# Patient Record
Sex: MALE | Race: WHITE | Employment: OTHER | URBAN - NONMETROPOLITAN AREA
[De-identification: names, ages, dates, MRNs, and addresses within clinical notes are randomized per-mention and may not be internally consistent; named-entity substitution may affect disease eponyms.]

---

## 2017-06-19 DIAGNOSIS — F41.9 ANXIETY: ICD-10-CM

## 2017-06-20 NOTE — TELEPHONE ENCOUNTER
Cardura      Last Written Prescription Date: 7/14/2016  Last Fill Quantity: 180, # refills: 0    Last Office Visit with G, P or [facility] prescribing provider:  8/28/2015   Future Office Visit:        BP Readings from Last 3 Encounters:   08/16/16 130/68   01/19/16 156/82   08/28/15 140/78

## 2017-06-22 RX ORDER — DOXAZOSIN 8 MG/1
TABLET ORAL
Qty: 30 TABLET | Refills: 0 | Status: SHIPPED | OUTPATIENT
Start: 2017-06-22 | End: 2017-09-11

## 2017-09-11 ENCOUNTER — OFFICE VISIT (OUTPATIENT)
Dept: FAMILY MEDICINE | Facility: OTHER | Age: 82
End: 2017-09-11
Attending: FAMILY MEDICINE
Payer: COMMERCIAL

## 2017-09-11 VITALS
WEIGHT: 151 LBS | SYSTOLIC BLOOD PRESSURE: 180 MMHG | OXYGEN SATURATION: 98 % | DIASTOLIC BLOOD PRESSURE: 84 MMHG | TEMPERATURE: 97.5 F | BODY MASS INDEX: 24.27 KG/M2 | HEART RATE: 55 BPM | HEIGHT: 66 IN

## 2017-09-11 DIAGNOSIS — H61.102 LESION OF LEFT PINNA: ICD-10-CM

## 2017-09-11 DIAGNOSIS — I10 ESSENTIAL HYPERTENSION, BENIGN: Primary | ICD-10-CM

## 2017-09-11 PROCEDURE — 99213 OFFICE O/P EST LOW 20 MIN: CPT | Performed by: FAMILY MEDICINE

## 2017-09-11 PROCEDURE — 99212 OFFICE O/P EST SF 10 MIN: CPT

## 2017-09-11 RX ORDER — DOXAZOSIN 8 MG/1
8 TABLET ORAL DAILY
Qty: 90 TABLET | Refills: 3 | Status: SHIPPED | OUTPATIENT
Start: 2017-09-11 | End: 2018-01-01

## 2017-09-11 ASSESSMENT — PAIN SCALES - GENERAL: PAINLEVEL: NO PAIN (0)

## 2017-09-11 NOTE — MR AVS SNAPSHOT
After Visit Summary   9/11/2017    Mary Irizarry    MRN: [id number]           Patient Information     Date Of Birth          8/27/1932        Visit Information        Provider Department      9/11/2017 1:45 PM JABIER Kent MD [facility]        Today's Diagnoses     Essential hypertension, benign    -  1    Anxiety        Lesion of left pinna          Care Instructions    See ENT. Try bacitracin          Follow-ups after your visit        Additional Services     OTOLARYNGOLOGY REFERRAL       [phone]   [email or web address]    Please be aware that coverage of these services is subject to the terms and limitations of your health insurance plan.  Call member services at your health plan with any benefit or coverage questions.      Please bring the following to your appointment:  >>   Any x-rays, CTs or MRIs which have been performed.  Contact the facility where they were done to arrange for  prior to your scheduled appointment.  Any new CT, MRI or other procedures ordered by your specialist must be performed at a [place] facility or coordinated by your clinic's referral office.    >>   List of current medications   >>   This referral request   >>   Any documents/labs given to you for this referral                  Your next 10 appointments already scheduled     Sep 11, 2017  1:45 PM CDT   (Arrive by 1:30 PM)   Office Visit with JABIER Kent MD   [facility] )    [address]  [facility] [address]   [phone]           Bring a current list of meds and any records pertaining to this visit.  For Physicals, please bring immunization records and any forms needing to be filled out.  Please arrive 15 minutes early to complete paperwork and register.              Who to contact     If you have questions or need  "follow up information about today's clinic visit or your schedule please contact [facility] directly at [phone].  Normal or non-critical lab and imaging results will be communicated to you by MyChart, letter or phone within 4 business days after the clinic has received the results. If you do not hear from us within 7 days, please contact the clinic through MyChart or phone. If you have a critical or abnormal lab result, we will notify you by phone as soon as possible.  Submit refill requests through [employer] or call your pharmacy and they will forward the refill request to us. Please allow 3 business days for your refill to be completed.          Additional Information About Your Visit        Care EveryWhere ID     This is your Care EveryWhere ID. This could be used by other organizations to access your [facility] medical records  [id number]        Your Vitals Were     Pulse Temperature Height Pulse Oximetry BMI (Body Mass Index)       55 97.5  F (36.4  C) (Tympanic) 5' 5.5\" (1.664 m) 98% 24.75 kg/m2        Blood Pressure from Last 3 Encounters:   09/11/17 180/84   08/16/16 130/68   01/19/16 156/82    Weight from Last 3 Encounters:   09/11/17 151 lb (68.5 kg)   08/16/16 150 lb (68 kg)   01/19/16 150 lb (68 kg)              We Performed the Following     OTOLARYNGOLOGY REFERRAL          Today's Medication Changes          These changes are accurate as of: 9/11/17  1:42 PM.  If you have any questions, ask your nurse or doctor.               These medicines have changed or have updated prescriptions.        Dose/Directions    doxazosin 8 MG tablet   Commonly known as:  CARDURA   This may have changed:  See the new instructions.   Used for:  Essential hypertension, benign        Dose:  8 mg   Take 1 tablet (8 mg) by mouth daily   Quantity:  90 tablet   Refills:  3            Where to get your medicines      These medications were sent to [facility] - [place], [place] - [address]  [address] [facility] [address]     Phone:  [phone]     doxazosin 8 MG tablet                Primary Care Provider Office Phone # Fax #    R Uriel Kent [phone]       [facility] [address] [facility] [address]        Equal Access to Services     SUSAN XAVIER : Hadii aad ku hadasho Soomaali, waaxda luqadaha, qaybta kaalmada adeegyada, waxay idiin hayaan adeeg kharash la'aan . So [facility] [phone].    ATENCIÓN: Si habla español, tiene a quiroz disposición servicios gratuitos de asistencia lingüística. Llame al [phone].    We comply with applicable federal civil rights laws and [place] laws. We do not discriminate on the basis of race, color, national origin, age, disability sex, sexual orientation or gender identity.            Thank you!     Thank you for choosing [facility]  for your care. Our goal is always to provide you with excellent care. Hearing back from our patients is one way we can continue to improve our services. Please take a few minutes to complete the written survey that you may receive in the mail after your visit with us. Thank you!             Your Updated Medication List - Protect others around you: Learn how to safely use, store and throw away your medicines at www.disposemymeds.org.          This list is accurate as of: 9/11/17  1:42 PM.  Always use your most recent med list.                   Brand Name Dispense Instructions for use Diagnosis    doxazosin 8 MG tablet    CARDURA    90 tablet    Take 1 tablet (8 mg) by mouth daily    Essential hypertension, benign

## 2017-09-11 NOTE — PROGRESS NOTES
SUBJECTIVE:   Mary Irizarry is a 85 year old male who presents to clinic today for the following health issues:        Hypertension Follow-up      Outpatient blood pressures are being checked at home.  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  He's still active at home and is content.  Here with his wife.    He also had something on his left Pinna and he scratched it.    Problem list and histories reviewed & adjusted, as indicated.  Additional history: as documented    Patient Active Problem List   Diagnosis     Facial lesion     Left inguinal hernia     ACP (advance care planning)     Lesion of left pinna     Essential hypertension, benign     Past Surgical History:   Procedure Laterality Date     GI SURGERY      prostatectomy       Social History   Substance Use Topics     Smoking status: Former Smoker     Smokeless tobacco: Never Used      Comment: 2 mos while in high school.      Alcohol use 0.0 oz/week     0 Standard drinks or equivalent per week      Comment: occa     No family history on file.      Current Outpatient Prescriptions   Medication Sig Dispense Refill     doxazosin (CARDURA) 8 MG tablet Take 1 tablet (8 mg) by mouth daily 90 tablet 3     [DISCONTINUED] doxazosin (CARDURA) 8 MG tablet TAKE 1 TABLET BY MOUTH DAILY 30 tablet 0     No Known Allergies      [facility] and [facility]       Reviewed and updated as needed this visit by Provider         ROS:  Constitutional, HEENT, cardiovascular, pulmonary, gi and gu systems are negative, except as otherwise noted.      OBJECTIVE:   /84 (BP Location:  "Left arm, Patient Position: Chair, Cuff Size: Adult Regular)  Pulse 55  Temp 97.5  F (36.4  C) (Tympanic)  Ht 5' 5.5\" (1.664 m)  Wt 151 lb (68.5 kg)  SpO2 98%  BMI 24.75 kg/m2  Body mass index is 24.75 kg/(m^2).  GENERAL: healthy, alert and no distress  NECK: no adenopathy, no asymmetry, masses, or scars and thyroid normal to palpation  HEENT: left pinna is a raised lesion that looks like he had unroofed it and there is a scab over it  RESP: lungs clear to auscultation - no rales, rhonchi or wheezes  CV: regular rate and rhythm, normal S1 S2, no S3 or S4, no murmur, click or rub, no peripheral edema and peripheral pulses strong  ABDOMEN: soft, nontender, no hepatosplenomegaly, no masses and bowel sounds normal  MS: no gross musculoskeletal defects noted, no edema    Diagnostic Test Results:  none     ASSESSMENT/PLAN:               ICD-10-CM    1. Essential hypertension, benign I10 doxazosin (CARDURA) 8 MG tablet his pressures at home are in the 140s.   2. Lesion of left pinna H61.102 OTOLARYNGOLOGY REFERRAL for reevaluation of this fleshy growth left pinna           R Uriel Kent MD  [facility] [place]  "

## 2017-09-11 NOTE — NURSING NOTE
"Chief Complaint   Patient presents with     Hypertension       Initial /84 (BP Location: Left arm, Patient Position: Chair, Cuff Size: Adult Regular)  Pulse 55  Temp 97.5  F (36.4  C) (Tympanic)  Ht 5' 5.5\" (1.664 m)  Wt 151 lb (68.5 kg)  SpO2 98%  BMI 24.75 kg/m2 Estimated body mass index is 24.75 kg/(m^2) as calculated from the following:    Height as of this encounter: 5' 5.5\" (1.664 m).    Weight as of this encounter: 151 lb (68.5 kg).  Medication Reconciliation: complete   Karla Prado CMA(AAMA)   "

## 2018-01-01 ENCOUNTER — ONCOLOGY VISIT (OUTPATIENT)
Dept: ONCOLOGY | Facility: OTHER | Age: 83
End: 2018-01-01
Attending: INTERNAL MEDICINE
Payer: MEDICARE

## 2018-01-01 ENCOUNTER — OFFICE VISIT (OUTPATIENT)
Dept: CARDIOLOGY | Facility: OTHER | Age: 83
End: 2018-01-01
Attending: INTERNAL MEDICINE
Payer: COMMERCIAL

## 2018-01-01 ENCOUNTER — HOSPITAL ENCOUNTER (OUTPATIENT)
Dept: PHYSICAL THERAPY | Facility: HOSPITAL | Age: 83
Setting detail: THERAPIES SERIES
End: 2018-12-20
Attending: FAMILY MEDICINE
Payer: MEDICARE

## 2018-01-01 ENCOUNTER — ANTICOAGULATION THERAPY VISIT (OUTPATIENT)
Dept: ANTICOAGULATION | Facility: OTHER | Age: 83
End: 2018-01-01
Attending: FAMILY MEDICINE
Payer: MEDICARE

## 2018-01-01 ENCOUNTER — TELEPHONE (OUTPATIENT)
Dept: FAMILY MEDICINE | Facility: OTHER | Age: 83
End: 2018-01-01

## 2018-01-01 ENCOUNTER — OFFICE VISIT (OUTPATIENT)
Dept: FAMILY MEDICINE | Facility: OTHER | Age: 83
End: 2018-01-01
Attending: FAMILY MEDICINE
Payer: MEDICARE

## 2018-01-01 VITALS
BODY MASS INDEX: 27.57 KG/M2 | DIASTOLIC BLOOD PRESSURE: 72 MMHG | TEMPERATURE: 97 F | HEART RATE: 89 BPM | WEIGHT: 176 LBS | OXYGEN SATURATION: 92 % | SYSTOLIC BLOOD PRESSURE: 108 MMHG

## 2018-01-01 VITALS
HEART RATE: 80 BPM | WEIGHT: 159 LBS | DIASTOLIC BLOOD PRESSURE: 67 MMHG | RESPIRATION RATE: 18 BRPM | HEIGHT: 68 IN | BODY MASS INDEX: 24.1 KG/M2 | SYSTOLIC BLOOD PRESSURE: 118 MMHG | OXYGEN SATURATION: 96 %

## 2018-01-01 VITALS
HEART RATE: 81 BPM | TEMPERATURE: 96.6 F | SYSTOLIC BLOOD PRESSURE: 102 MMHG | BODY MASS INDEX: 27.4 KG/M2 | RESPIRATION RATE: 17 BRPM | WEIGHT: 174.6 LBS | HEIGHT: 67 IN | OXYGEN SATURATION: 96 % | DIASTOLIC BLOOD PRESSURE: 60 MMHG

## 2018-01-01 VITALS
HEIGHT: 69 IN | DIASTOLIC BLOOD PRESSURE: 68 MMHG | SYSTOLIC BLOOD PRESSURE: 119 MMHG | OXYGEN SATURATION: 95 % | BODY MASS INDEX: 24.88 KG/M2 | RESPIRATION RATE: 16 BRPM | WEIGHT: 168 LBS | HEART RATE: 80 BPM

## 2018-01-01 DIAGNOSIS — R60.0 LOCALIZED EDEMA: ICD-10-CM

## 2018-01-01 DIAGNOSIS — I51.89 DIASTOLIC DYSFUNCTION: Primary | ICD-10-CM

## 2018-01-01 DIAGNOSIS — I50.32 CHRONIC DIASTOLIC HEART FAILURE (H): ICD-10-CM

## 2018-01-01 DIAGNOSIS — Z91.89 SEDENTARY LIFESTYLE: ICD-10-CM

## 2018-01-01 DIAGNOSIS — I51.89 DIASTOLIC DYSFUNCTION: ICD-10-CM

## 2018-01-01 DIAGNOSIS — Z79.01 ON COUMADIN FOR ATRIAL FIBRILLATION (H): ICD-10-CM

## 2018-01-01 DIAGNOSIS — I51.7 LAE (LEFT ATRIAL ENLARGEMENT): ICD-10-CM

## 2018-01-01 DIAGNOSIS — R06.09 DYSPNEA ON EXERTION: ICD-10-CM

## 2018-01-01 DIAGNOSIS — I10 ESSENTIAL HYPERTENSION, BENIGN: ICD-10-CM

## 2018-01-01 DIAGNOSIS — N18.2 CHRONIC KIDNEY DISEASE, STAGE 2 (MILD): ICD-10-CM

## 2018-01-01 DIAGNOSIS — D68.59 PRIMARY HYPERCOAGULABLE STATE (H): ICD-10-CM

## 2018-01-01 DIAGNOSIS — Z23 NEED FOR PROPHYLACTIC VACCINATION AND INOCULATION AGAINST INFLUENZA: ICD-10-CM

## 2018-01-01 DIAGNOSIS — R53.83 OTHER FATIGUE: ICD-10-CM

## 2018-01-01 DIAGNOSIS — R60.0 PEDAL EDEMA: ICD-10-CM

## 2018-01-01 DIAGNOSIS — I48.91 ON COUMADIN FOR ATRIAL FIBRILLATION (H): ICD-10-CM

## 2018-01-01 DIAGNOSIS — Z86.711 HISTORY OF PULMONARY EMBOLISM: ICD-10-CM

## 2018-01-01 DIAGNOSIS — G89.29 CHRONIC LEFT SHOULDER PAIN: Primary | ICD-10-CM

## 2018-01-01 DIAGNOSIS — M25.512 CHRONIC LEFT SHOULDER PAIN: ICD-10-CM

## 2018-01-01 DIAGNOSIS — I48.91 NEW ONSET A-FIB (H): ICD-10-CM

## 2018-01-01 DIAGNOSIS — I26.99 PULMONARY EMBOLISM, BILATERAL (H): Primary | ICD-10-CM

## 2018-01-01 DIAGNOSIS — J96.01 ACUTE RESPIRATORY FAILURE WITH HYPOXIA (H): Primary | ICD-10-CM

## 2018-01-01 DIAGNOSIS — M25.512 CHRONIC LEFT SHOULDER PAIN: Primary | ICD-10-CM

## 2018-01-01 DIAGNOSIS — G89.29 CHRONIC LEFT SHOULDER PAIN: ICD-10-CM

## 2018-01-01 DIAGNOSIS — Z79.01 LONG TERM CURRENT USE OF ANTICOAGULANT THERAPY: Primary | ICD-10-CM

## 2018-01-01 LAB
ALBUMIN SERPL-MCNC: 3.4 G/DL (ref 3.4–5)
ALP SERPL-CCNC: 71 U/L (ref 40–150)
ALT SERPL W P-5'-P-CCNC: 34 U/L (ref 0–70)
ANION GAP SERPL CALCULATED.3IONS-SCNC: 5 MMOL/L (ref 3–14)
ANION GAP SERPL CALCULATED.3IONS-SCNC: 6 MMOL/L (ref 3–14)
AST SERPL W P-5'-P-CCNC: 19 U/L (ref 0–45)
BASOPHILS # BLD AUTO: 0 10E9/L (ref 0–0.2)
BASOPHILS NFR BLD AUTO: 0.4 %
BILIRUB SERPL-MCNC: 0.7 MG/DL (ref 0.2–1.3)
BUN SERPL-MCNC: 21 MG/DL (ref 7–30)
BUN SERPL-MCNC: 24 MG/DL (ref 7–30)
CALCIUM SERPL-MCNC: 8.4 MG/DL (ref 8.5–10.1)
CALCIUM SERPL-MCNC: 8.4 MG/DL (ref 8.5–10.1)
CHLORIDE SERPL-SCNC: 107 MMOL/L (ref 94–109)
CHLORIDE SERPL-SCNC: 109 MMOL/L (ref 94–109)
CO2 SERPL-SCNC: 28 MMOL/L (ref 20–32)
CO2 SERPL-SCNC: 28 MMOL/L (ref 20–32)
CREAT SERPL-MCNC: 1.02 MG/DL (ref 0.66–1.25)
CREAT SERPL-MCNC: 1.1 MG/DL (ref 0.66–1.25)
D DIMER PPP DDU-MCNC: 249 NG/ML D-DU (ref 0–300)
DIFFERENTIAL METHOD BLD: ABNORMAL
EOSINOPHIL # BLD AUTO: 0.1 10E9/L (ref 0–0.7)
EOSINOPHIL NFR BLD AUTO: 1 %
ERYTHROCYTE [DISTWIDTH] IN BLOOD BY AUTOMATED COUNT: 15.9 % (ref 10–15)
GFR SERPL CREATININE-BSD FRML MDRD: 63 ML/MIN/1.7M2
GFR SERPL CREATININE-BSD FRML MDRD: 69 ML/MIN/1.7M2
GLUCOSE SERPL-MCNC: 91 MG/DL (ref 70–99)
GLUCOSE SERPL-MCNC: 94 MG/DL (ref 70–99)
HCT VFR BLD AUTO: 36.5 % (ref 40–53)
HCYS SERPL-SCNC: 10.2 UMOL/L (ref 4–12)
HGB BLD-MCNC: 11.3 G/DL (ref 13.3–17.7)
IMM GRANULOCYTES # BLD: 0 10E9/L (ref 0–0.4)
IMM GRANULOCYTES NFR BLD: 0.1 %
INR POINT OF CARE: 2 (ref 0.86–1.14)
INR POINT OF CARE: 2.6 (ref 0.86–1.14)
INR POINT OF CARE: 3.9 (ref 0.86–1.14)
INR PPP: 3.57 (ref 0.8–1.2)
LDH SERPL L TO P-CCNC: 238 U/L (ref 85–227)
LYMPHOCYTES # BLD AUTO: 2.1 10E9/L (ref 0.8–5.3)
LYMPHOCYTES NFR BLD AUTO: 30.4 %
MCH RBC QN AUTO: 24.7 PG (ref 26.5–33)
MCHC RBC AUTO-ENTMCNC: 31 G/DL (ref 31.5–36.5)
MCV RBC AUTO: 80 FL (ref 78–100)
MONOCYTES # BLD AUTO: 0.6 10E9/L (ref 0–1.3)
MONOCYTES NFR BLD AUTO: 8.7 %
NEUTROPHILS # BLD AUTO: 4 10E9/L (ref 1.6–8.3)
NEUTROPHILS NFR BLD AUTO: 59.4 %
NRBC # BLD AUTO: 0 10*3/UL
NRBC BLD AUTO-RTO: 0 /100
NT-PROBNP SERPL-MCNC: 2114 PG/ML (ref 0–450)
NT-PROBNP SERPL-MCNC: 2374 PG/ML (ref 0–450)
PLATELET # BLD AUTO: 203 10E9/L (ref 150–450)
POTASSIUM SERPL-SCNC: 3.7 MMOL/L (ref 3.4–5.3)
POTASSIUM SERPL-SCNC: 3.9 MMOL/L (ref 3.4–5.3)
PROT SERPL-MCNC: 7.6 G/DL (ref 6.8–8.8)
RBC # BLD AUTO: 4.57 10E12/L (ref 4.4–5.9)
SODIUM SERPL-SCNC: 141 MMOL/L (ref 133–144)
SODIUM SERPL-SCNC: 142 MMOL/L (ref 133–144)
WBC # BLD AUTO: 6.8 10E9/L (ref 4–11)

## 2018-01-01 PROCEDURE — 99213 OFFICE O/P EST LOW 20 MIN: CPT | Performed by: INTERNAL MEDICINE

## 2018-01-01 PROCEDURE — 93010 ELECTROCARDIOGRAM REPORT: CPT | Performed by: INTERNAL MEDICINE

## 2018-01-01 PROCEDURE — G0463 HOSPITAL OUTPT CLINIC VISIT: HCPCS

## 2018-01-01 PROCEDURE — 36415 COLL VENOUS BLD VENIPUNCTURE: CPT | Mod: ZL | Performed by: FAMILY MEDICINE

## 2018-01-01 PROCEDURE — 83880 ASSAY OF NATRIURETIC PEPTIDE: CPT | Mod: ZL | Performed by: INTERNAL MEDICINE

## 2018-01-01 PROCEDURE — 97161 PT EVAL LOW COMPLEX 20 MIN: CPT | Mod: GP

## 2018-01-01 PROCEDURE — G8982 BODY POS GOAL STATUS: HCPCS | Mod: GP,CJ

## 2018-01-01 PROCEDURE — 99000 SPECIMEN HANDLING OFFICE-LAB: CPT | Performed by: INTERNAL MEDICINE

## 2018-01-01 PROCEDURE — 80048 BASIC METABOLIC PNL TOTAL CA: CPT | Mod: ZL | Performed by: INTERNAL MEDICINE

## 2018-01-01 PROCEDURE — 80053 COMPREHEN METABOLIC PANEL: CPT | Mod: ZL | Performed by: INTERNAL MEDICINE

## 2018-01-01 PROCEDURE — G0008 ADMIN INFLUENZA VIRUS VAC: HCPCS | Performed by: FAMILY MEDICINE

## 2018-01-01 PROCEDURE — 40000718 ZZHC STATISTIC PT DEPARTMENT ORTHO VISIT

## 2018-01-01 PROCEDURE — 85025 COMPLETE CBC W/AUTO DIFF WBC: CPT | Mod: ZL | Performed by: INTERNAL MEDICINE

## 2018-01-01 PROCEDURE — 90662 IIV NO PRSV INCREASED AG IM: CPT | Performed by: FAMILY MEDICINE

## 2018-01-01 PROCEDURE — 83615 LACTATE (LD) (LDH) ENZYME: CPT | Mod: ZL | Performed by: INTERNAL MEDICINE

## 2018-01-01 PROCEDURE — 99204 OFFICE O/P NEW MOD 45 MIN: CPT | Performed by: INTERNAL MEDICINE

## 2018-01-01 PROCEDURE — 36415 COLL VENOUS BLD VENIPUNCTURE: CPT | Mod: ZL | Performed by: INTERNAL MEDICINE

## 2018-01-01 PROCEDURE — 93005 ELECTROCARDIOGRAM TRACING: CPT

## 2018-01-01 PROCEDURE — 99214 OFFICE O/P EST MOD 30 MIN: CPT | Performed by: FAMILY MEDICINE

## 2018-01-01 PROCEDURE — 83090 ASSAY OF HOMOCYSTEINE: CPT | Mod: ZL | Performed by: INTERNAL MEDICINE

## 2018-01-01 PROCEDURE — 97110 THERAPEUTIC EXERCISES: CPT | Mod: GP

## 2018-01-01 PROCEDURE — 83880 ASSAY OF NATRIURETIC PEPTIDE: CPT | Mod: ZL | Performed by: FAMILY MEDICINE

## 2018-01-01 PROCEDURE — G8981 BODY POS CURRENT STATUS: HCPCS | Mod: GP,CK

## 2018-01-01 PROCEDURE — 85610 PROTHROMBIN TIME: CPT | Mod: QW,ZL

## 2018-01-01 PROCEDURE — G0463 HOSPITAL OUTPT CLINIC VISIT: HCPCS | Mod: 25

## 2018-01-01 PROCEDURE — 85610 PROTHROMBIN TIME: CPT | Mod: ZL | Performed by: FAMILY MEDICINE

## 2018-01-01 PROCEDURE — 85379 FIBRIN DEGRADATION QUANT: CPT | Mod: ZL | Performed by: INTERNAL MEDICINE

## 2018-01-01 PROCEDURE — 99214 OFFICE O/P EST MOD 30 MIN: CPT | Performed by: INTERNAL MEDICINE

## 2018-01-01 RX ORDER — FUROSEMIDE 40 MG
40 TABLET ORAL DAILY
Qty: 93 TABLET | Refills: 3 | Status: SHIPPED | OUTPATIENT
Start: 2018-01-01 | End: 2018-01-01

## 2018-01-01 RX ORDER — FUROSEMIDE 20 MG
20 TABLET ORAL DAILY
Qty: 30 TABLET | Refills: 5 | Status: SHIPPED | OUTPATIENT
Start: 2018-01-01 | End: 2018-01-01 | Stop reason: ALTCHOICE

## 2018-01-01 RX ORDER — METOPROLOL SUCCINATE 25 MG/1
25 TABLET, EXTENDED RELEASE ORAL DAILY
Qty: 93 TABLET | Refills: 3 | Status: SHIPPED | OUTPATIENT
Start: 2018-01-01 | End: 2018-01-01

## 2018-01-01 RX ORDER — LISINOPRIL 10 MG/1
10 TABLET ORAL DAILY
Qty: 93 TABLET | Refills: 3 | Status: SHIPPED | OUTPATIENT
Start: 2018-01-01 | End: 2018-01-01

## 2018-01-01 RX ORDER — LISINOPRIL 10 MG/1
10 TABLET ORAL DAILY
Qty: 30 TABLET | Refills: 1 | Status: SHIPPED | OUTPATIENT
Start: 2018-01-01 | End: 2018-01-01

## 2018-01-01 RX ORDER — METOPROLOL SUCCINATE 25 MG/1
25 TABLET, EXTENDED RELEASE ORAL DAILY
Qty: 93 TABLET | Refills: 3 | Status: SHIPPED | OUTPATIENT
Start: 2018-01-01

## 2018-01-01 RX ORDER — DOXAZOSIN 8 MG/1
8 TABLET ORAL DAILY
Qty: 93 TABLET | Refills: 3 | Status: SHIPPED | OUTPATIENT
Start: 2018-01-01 | End: 2019-01-01

## 2018-01-01 RX ORDER — LISINOPRIL 10 MG/1
10 TABLET ORAL DAILY
Qty: 30 TABLET | Refills: 1 | Status: SHIPPED | OUTPATIENT
Start: 2018-01-01 | End: 2019-01-01

## 2018-01-01 RX ORDER — FUROSEMIDE 20 MG
20 TABLET ORAL DAILY
Qty: 30 TABLET | Refills: 3 | Status: SHIPPED | OUTPATIENT
Start: 2018-01-01 | End: 2018-01-01

## 2018-01-01 RX ORDER — FUROSEMIDE 40 MG
40 TABLET ORAL DAILY
Qty: 93 TABLET | Refills: 3 | Status: SHIPPED | OUTPATIENT
Start: 2018-01-01 | End: 2019-01-01

## 2018-01-01 ASSESSMENT — ANXIETY QUESTIONNAIRES
1. FEELING NERVOUS, ANXIOUS, OR ON EDGE: NOT AT ALL
3. WORRYING TOO MUCH ABOUT DIFFERENT THINGS: NOT AT ALL
GAD7 TOTAL SCORE: 0
4. TROUBLE RELAXING: NOT AT ALL
2. NOT BEING ABLE TO STOP OR CONTROL WORRYING: NOT AT ALL
5. BEING SO RESTLESS THAT IT IS HARD TO SIT STILL: NOT AT ALL
6. BECOMING EASILY ANNOYED OR IRRITABLE: NOT AT ALL
GAD7 TOTAL SCORE: 0
7. FEELING AFRAID AS IF SOMETHING AWFUL MIGHT HAPPEN: NOT AT ALL

## 2018-01-01 ASSESSMENT — PAIN SCALES - GENERAL
PAINLEVEL: MODERATE PAIN (5)
PAINLEVEL: NO PAIN (0)

## 2018-01-01 ASSESSMENT — PATIENT HEALTH QUESTIONNAIRE - PHQ9
SUM OF ALL RESPONSES TO PHQ QUESTIONS 1-9: 7
SUM OF ALL RESPONSES TO PHQ QUESTIONS 1-9: 5

## 2018-01-10 ENCOUNTER — APPOINTMENT (OUTPATIENT)
Dept: CT IMAGING | Facility: HOSPITAL | Age: 83
DRG: 175 | End: 2018-01-10
Attending: INTERNAL MEDICINE
Payer: MEDICARE

## 2018-01-10 ENCOUNTER — APPOINTMENT (OUTPATIENT)
Dept: GENERAL RADIOLOGY | Facility: HOSPITAL | Age: 83
DRG: 175 | End: 2018-01-10
Attending: FAMILY MEDICINE
Payer: MEDICARE

## 2018-01-10 ENCOUNTER — HOSPITAL ENCOUNTER (INPATIENT)
Facility: HOSPITAL | Age: 83
LOS: 2 days | Discharge: HOME | DRG: 175 | End: 2018-01-13
Attending: FAMILY MEDICINE | Admitting: INTERNAL MEDICINE
Payer: MEDICARE

## 2018-01-10 DIAGNOSIS — I10 ESSENTIAL HYPERTENSION, BENIGN: Primary | ICD-10-CM

## 2018-01-10 DIAGNOSIS — I26.99 PE (PULMONARY THROMBOEMBOLISM) (H): ICD-10-CM

## 2018-01-10 LAB
ALBUMIN SERPL-MCNC: 3.6 G/DL (ref 3.4–5)
ALBUMIN UR-MCNC: 30 MG/DL
ALP SERPL-CCNC: 68 U/L (ref 40–150)
ALT SERPL W P-5'-P-CCNC: 39 U/L (ref 0–70)
ANION GAP SERPL CALCULATED.3IONS-SCNC: 5 MMOL/L (ref 3–14)
APPEARANCE UR: CLEAR
AST SERPL W P-5'-P-CCNC: 48 U/L (ref 0–45)
BACTERIA #/AREA URNS HPF: ABNORMAL /HPF
BASOPHILS # BLD AUTO: 0 10E9/L (ref 0–0.2)
BASOPHILS NFR BLD AUTO: 0.3 %
BILIRUB SERPL-MCNC: 0.8 MG/DL (ref 0.2–1.3)
BILIRUB UR QL STRIP: NEGATIVE
BUN SERPL-MCNC: 23 MG/DL (ref 7–30)
CALCIUM SERPL-MCNC: 8.3 MG/DL (ref 8.5–10.1)
CHLORIDE SERPL-SCNC: 109 MMOL/L (ref 94–109)
CO2 SERPL-SCNC: 29 MMOL/L (ref 20–32)
COLOR UR AUTO: YELLOW
CREAT SERPL-MCNC: 0.93 MG/DL (ref 0.66–1.25)
CRP SERPL-MCNC: <2.9 MG/L (ref 0–8)
D DIMER PPP DDU-MCNC: 4443 NG/ML D-DU (ref 0–300)
DIFFERENTIAL METHOD BLD: ABNORMAL
EOSINOPHIL # BLD AUTO: 0.1 10E9/L (ref 0–0.7)
EOSINOPHIL NFR BLD AUTO: 1.2 %
ERYTHROCYTE [DISTWIDTH] IN BLOOD BY AUTOMATED COUNT: 13.1 % (ref 10–15)
FLUAV+FLUBV AG SPEC QL: NEGATIVE
FLUAV+FLUBV AG SPEC QL: NEGATIVE
GFR SERPL CREATININE-BSD FRML MDRD: 77 ML/MIN/1.7M2
GLUCOSE SERPL-MCNC: 130 MG/DL (ref 70–99)
GLUCOSE UR STRIP-MCNC: NEGATIVE MG/DL
HCT VFR BLD AUTO: 42.2 % (ref 40–53)
HGB BLD-MCNC: 13.7 G/DL (ref 13.3–17.7)
HGB UR QL STRIP: NEGATIVE
IMM GRANULOCYTES # BLD: 0 10E9/L (ref 0–0.4)
IMM GRANULOCYTES NFR BLD: 0.3 %
KETONES UR STRIP-MCNC: NEGATIVE MG/DL
LEUKOCYTE ESTERASE UR QL STRIP: NEGATIVE
LYMPHOCYTES # BLD AUTO: 1.7 10E9/L (ref 0.8–5.3)
LYMPHOCYTES NFR BLD AUTO: 24.2 %
MCH RBC QN AUTO: 29.5 PG (ref 26.5–33)
MCHC RBC AUTO-ENTMCNC: 32.5 G/DL (ref 31.5–36.5)
MCV RBC AUTO: 91 FL (ref 78–100)
MONOCYTES # BLD AUTO: 0.4 10E9/L (ref 0–1.3)
MONOCYTES NFR BLD AUTO: 5.7 %
MUCOUS THREADS #/AREA URNS LPF: PRESENT /LPF
NEUTROPHILS # BLD AUTO: 4.7 10E9/L (ref 1.6–8.3)
NEUTROPHILS NFR BLD AUTO: 68.3 %
NITRATE UR QL: NEGATIVE
NRBC # BLD AUTO: 0 10*3/UL
NRBC BLD AUTO-RTO: 0 /100
PH UR STRIP: 5.5 PH (ref 4.7–8)
PLATELET # BLD AUTO: 104 10E9/L (ref 150–450)
POTASSIUM SERPL-SCNC: 3.6 MMOL/L (ref 3.4–5.3)
PROT SERPL-MCNC: 7.3 G/DL (ref 6.8–8.8)
RBC # BLD AUTO: 4.65 10E12/L (ref 4.4–5.9)
RBC #/AREA URNS AUTO: 2 /HPF (ref 0–2)
SODIUM SERPL-SCNC: 143 MMOL/L (ref 133–144)
SOURCE: ABNORMAL
SP GR UR STRIP: 1.02 (ref 1–1.03)
SPECIMEN SOURCE: NORMAL
TROPONIN I SERPL-MCNC: 0.03 UG/L (ref 0–0.04)
TROPONIN I SERPL-MCNC: 0.42 UG/L (ref 0–0.04)
UROBILINOGEN UR STRIP-MCNC: NORMAL MG/DL (ref 0–2)
WBC # BLD AUTO: 6.9 10E9/L (ref 4–11)
WBC #/AREA URNS AUTO: 1 /HPF (ref 0–2)

## 2018-01-10 PROCEDURE — 96360 HYDRATION IV INFUSION INIT: CPT

## 2018-01-10 PROCEDURE — 36415 COLL VENOUS BLD VENIPUNCTURE: CPT | Performed by: FAMILY MEDICINE

## 2018-01-10 PROCEDURE — 71275 CT ANGIOGRAPHY CHEST: CPT | Mod: TC

## 2018-01-10 PROCEDURE — 93005 ELECTROCARDIOGRAM TRACING: CPT

## 2018-01-10 PROCEDURE — 25000128 H RX IP 250 OP 636: Performed by: FAMILY MEDICINE

## 2018-01-10 PROCEDURE — 84484 ASSAY OF TROPONIN QUANT: CPT | Performed by: FAMILY MEDICINE

## 2018-01-10 PROCEDURE — 71046 X-RAY EXAM CHEST 2 VIEWS: CPT | Mod: TC

## 2018-01-10 PROCEDURE — 99283 EMERGENCY DEPT VISIT LOW MDM: CPT | Performed by: FAMILY MEDICINE

## 2018-01-10 PROCEDURE — 25000128 H RX IP 250 OP 636: Performed by: INTERNAL MEDICINE

## 2018-01-10 PROCEDURE — 80053 COMPREHEN METABOLIC PANEL: CPT | Performed by: FAMILY MEDICINE

## 2018-01-10 PROCEDURE — 81001 URINALYSIS AUTO W/SCOPE: CPT | Performed by: FAMILY MEDICINE

## 2018-01-10 PROCEDURE — 87804 INFLUENZA ASSAY W/OPTIC: CPT | Performed by: FAMILY MEDICINE

## 2018-01-10 PROCEDURE — 93010 ELECTROCARDIOGRAM REPORT: CPT | Mod: 76 | Performed by: INTERNAL MEDICINE

## 2018-01-10 PROCEDURE — 99285 EMERGENCY DEPT VISIT HI MDM: CPT | Mod: 25

## 2018-01-10 PROCEDURE — 85379 FIBRIN DEGRADATION QUANT: CPT | Performed by: INTERNAL MEDICINE

## 2018-01-10 PROCEDURE — 85025 COMPLETE CBC W/AUTO DIFF WBC: CPT | Performed by: FAMILY MEDICINE

## 2018-01-10 PROCEDURE — 93005 ELECTROCARDIOGRAM TRACING: CPT | Mod: 76

## 2018-01-10 PROCEDURE — 25000132 ZZH RX MED GY IP 250 OP 250 PS 637: Mod: GY | Performed by: INTERNAL MEDICINE

## 2018-01-10 PROCEDURE — A9270 NON-COVERED ITEM OR SERVICE: HCPCS | Mod: GY | Performed by: INTERNAL MEDICINE

## 2018-01-10 PROCEDURE — 86140 C-REACTIVE PROTEIN: CPT | Performed by: FAMILY MEDICINE

## 2018-01-10 RX ORDER — IOPAMIDOL 755 MG/ML
75 INJECTION, SOLUTION INTRAVASCULAR ONCE
Status: COMPLETED | OUTPATIENT
Start: 2018-01-10 | End: 2018-01-10

## 2018-01-10 RX ORDER — SODIUM CHLORIDE 9 MG/ML
INJECTION, SOLUTION INTRAVENOUS ONCE
Status: COMPLETED | OUTPATIENT
Start: 2018-01-10 | End: 2018-01-10

## 2018-01-10 RX ORDER — SODIUM CHLORIDE 9 MG/ML
1000 INJECTION, SOLUTION INTRAVENOUS CONTINUOUS
Status: DISCONTINUED | OUTPATIENT
Start: 2018-01-10 | End: 2018-01-11

## 2018-01-10 RX ORDER — ASPIRIN 81 MG/1
162 TABLET, CHEWABLE ORAL ONCE
Status: COMPLETED | OUTPATIENT
Start: 2018-01-10 | End: 2018-01-10

## 2018-01-10 RX ORDER — CLOPIDOGREL BISULFATE 75 MG/1
75 TABLET ORAL ONCE
Status: COMPLETED | OUTPATIENT
Start: 2018-01-10 | End: 2018-01-10

## 2018-01-10 RX ADMIN — SODIUM CHLORIDE 500 ML: 9 INJECTION, SOLUTION INTRAVENOUS at 18:24

## 2018-01-10 RX ADMIN — ENOXAPARIN SODIUM 70 MG: 80 INJECTION SUBCUTANEOUS at 22:30

## 2018-01-10 RX ADMIN — SODIUM CHLORIDE 90 ML: 9 INJECTION INTRAMUSCULAR; INTRAVENOUS; SUBCUTANEOUS at 23:21

## 2018-01-10 RX ADMIN — CLOPIDOGREL 75 MG: 75 TABLET, FILM COATED ORAL at 22:29

## 2018-01-10 RX ADMIN — IOPAMIDOL 75 ML: 755 INJECTION, SOLUTION INTRAVENOUS at 23:21

## 2018-01-10 RX ADMIN — ASPIRIN 81 MG 162 MG: 81 TABLET ORAL at 22:25

## 2018-01-10 ASSESSMENT — ENCOUNTER SYMPTOMS
COUGH: 1
DIZZINESS: 0
LIGHT-HEADEDNESS: 0
PSYCHIATRIC NEGATIVE: 1
WHEEZING: 0
MYALGIAS: 0
NAUSEA: 0
ARTHRALGIAS: 0
EYES NEGATIVE: 1
ABDOMINAL PAIN: 1
DYSURIA: 0
FEVER: 0
CHILLS: 0
COLOR CHANGE: 0
FREQUENCY: 1
ACTIVITY CHANGE: 0
SORE THROAT: 0
SHORTNESS OF BREATH: 1
ABDOMINAL DISTENTION: 0
CONSTIPATION: 1
FATIGUE: 0

## 2018-01-10 NOTE — IP AVS SNAPSHOT
[facility]    [address] [address]    Phone:  [phone]    Fax:  [phone]                                       After Visit Summary   1/10/2018    Mary Irizarry    MRN: [id number]           After Visit Summary Signature Page     I have received my discharge instructions, and my questions have been answered. I have discussed any challenges I see with this plan with the nurse or doctor.    ..........................................................................................................................................  Patient/Patient Representative Signature      ..........................................................................................................................................  Patient Representative Print Name and Relationship to Patient    ..................................................               ................................................  Date                                            Time    ..........................................................................................................................................  Reviewed by Signature/Title    ...................................................              ..............................................  Date                                                            Time

## 2018-01-10 NOTE — IP AVS SNAPSHOT
MRN:[id number]                      After Visit Summary   1/10/2018    Mary Irizarry    MRN: [id number]           Thank you!     Thank you for choosing [facility] for your care. Our goal is always to provide you with excellent care. Hearing back from our patients is one way we can continue to improve our services. Please take a few minutes to complete the written survey that you may receive in the mail after you visit with us. Thank you!        Patient Information     Date Of Birth          8/27/1932        Designated Caregiver       Most Recent Value    Caregiver    Will someone help with your care after discharge? yes    Name of designated caregiver Wife- Queta Pimentel    Phone number of caregiver [phone]    Caregiver address [place]      About your hospital stay     You were admitted on:  January 11, 2018 You last received care in the:  [facility]    You were discharged on:  January 13, 2018        Reason for your hospital stay       Pulmonary embolism                  Who to Call     For medical emergencies, please call 911.  For non-urgent questions about your medical care, please call your primary care provider or clinic, [phone]          Attending Provider     Provider Specialty    Marion Alexis MD Emergency Medicine    Flaco Mckeon MD Internal Medicine    Mike Kauffman MD Internal Medicine    Phoebe Michael, NP Nurse Practitioner       Primary Care Provider Office Phone # Fax #    R Uriel Kent [phone]      After Care Instructions     Activity       Your activity upon discharge: activity as tolerated            Diet       Follow this diet upon discharge: Orders Placed This Encounter      Combination Diet Regular Diet Adult            Discharge Instructions       No aerobic or strenuous activities until seen for follow-up. Rest with legs elevated, take frequent short leisurely walks about the house until then. Notify physician  or present to the Emergency Department if you develop shortness of breath, leg swelling, excessive bleeding or bruising.                  Follow-up Appointments     Follow-up and recommended labs and tests        Follow up with primary care provider, JABIER Kent, within 7 days for hospital follow- up.  No follow up labs or test are needed.  Follow-up with coumadin clinic on Monday Dec 15th for lab check (INR), the coumadin clinic should call you for appointment, if you do not hear from them by end of day Monday, please call in to clinic.                  Your next 10 appointments already scheduled     Jan 16, 2018 11:00 AM CST   (Arrive by 10:45 AM)   Office Visit with JABIER Kent MD   [facility] ([facility] )    [address]  [facility] [address]   [phone]           Bring a current list of meds and any records pertaining to this visit.  For Physicals, please bring immunization records and any forms needing to be filled out.  Please arrive 15 minutes early to complete paperwork and register.              Additional Services     INR CLINIC REFERRAL       Your provider has referred you to INR Services.    Please be aware that coverage of these services is subject to the terms and limitations of your health insurance plan.  Call member services at your health plan with any benefit or coverage questions.    Indication for Anticoagulation: Pulmonary Embolism  If nonstandard INR is desired, indicate goal range and explanation:   Expected Duration of Therapy: Lifetime                  Further instructions from your care team       What to expect when you have contrast    During your exam, we will inject  contrast  into your vein or artery. (Contrast is a clear liquid with iodine in it. It shows up on X-rays.)    You may feel warm or hot. You may have a metal taste in your mouth and a slight upset stomach. You may also feel pressure near the kidneys and bladder. These effects  will last about 1 to 3 minutes.    Please tell us if you have:    Sneezing     Itching    Hives     Swelling in the face    A hoarse voice    Breathing problems    Other new symptoms    Serious problems are rare.  They may include:    Irregular heartbeat     Seizures    Kidney failure              Tissue damage    Shock      Death    If you have any problems during the exam, we  will treat them right away.    When you get home    Call your hospital if you have any new symptoms in the next 2 days, like hives or swelling. (Phone numbers are at the bottom of this page.) Or call your family doctor.     If you have wheezing or trouble breathing, call 911.    Self-care  -Drink at least 4 extra glasses of water today.   This reduces the stress on your kidneys.  -Keep taking your regular medicines.    The contrast will pass out of your body in your  Urine(pee). This will happen in the next 24 hours. You  will not feel this. Your urine will not  change color.    If you have kidney problems or take metformin    Drink 4 to 8 large glasses of water for the next  2 days, if you are not on a fluid restriction.    ?If you take metformin (Glucophage or Glucovance) for diabetes, keep taking it.      ?Your kidney function tests are abnormal.  If you take Metformin, do not take it for 48 hours. Please go to your clinic for a blood test within 3 days after your exam before the restarting this medicine.     (Note to provider:please give patient prescription for lab tests.)    ?Special instructions: Drink an extra 4 glasses of water to flush out the contrast.     I have read and understand the above information.    Patient Sign Here:______________________________________Date:________Time:______    Staff Sign Here:________________________________________Date:_______Time:______      Radiology Departments:     ?[facility]: [phone] ?[facility]: [phone]     ?[facility]: [phone] ?[facility]:[phone]      ?[facility]:  "[phone]  ?[facility]: [phone]  ?[facility]:[phone]    ?[facility]:[phone]  ?[facility]:[phone]    Warfarin Instruction     You have started taking a medicine called warfarin. This is a blood-thinning medicine (anticoagulant). It helps prevent and treat blood clots.      Before leaving the hospital, make sure you know how much to take and how long to take it.      You will need regular blood tests to make sure your blood is clotting safely. It is very important to see your doctor for regular blood tests.    Talk to your doctor before taking any new medicine (this includes over-the-counter drugs and herbal products). Many medicines can interact with warfarin. This may cause more bleeding or too much clotting.     Eating a lot of vitamin K--found in green, leafy vegetables--can change the way warfarin works in your body. Do NOT avoid these foods. Instead, try to eat the same amount each day.     Bleeding is the most common side-effect of warfarin. You may notice bleeding gums, a bloody nose, bruises and bleeding longer when you cut yourself. See a doctor at once if:   o You cough up blood  o You find blood in your stool (poop)  o You have a deep cut, or a cut that bleeds longer than 10 minutes   o You have a bad cut, hard fall, accident or hit your head (go to urgent care or the emergency room).    For women who can get pregnant: This medicine can harm an unborn baby. Be very careful not to get pregnant while taking this medicine. If you think you might be pregnant, call your doctor right away.    For more information, read \"Guide to Warfarin Therapy,  the booklet you received in the hospital.        Pending Results     No orders found from 1/8/2018 to 1/11/2018.            Statement of Approval     Ordered          01/13/18 1031  I have reviewed and agree with all the recommendations and orders detailed in this document.  EFFECTIVE NOW     Approved and electronically signed by:  Alec, " "Phoebe EUGENE NP             Admission Information     Date & Time Department Dept. Phone    1/10/2018 [facility] [phone]      Your Vitals Were     Blood Pressure Pulse Temperature Respirations Height Weight    145/71 76 99.9  F (37.7  C) (Tympanic) 18 1.702 m (5' 7\") 70.7 kg (155 lb 13.8 oz)    Pulse Oximetry BMI (Body Mass Index)                93% 24.41 kg/m2          [employer] Information     [facility] lets you send messages to your doctor, view your test results, renew your prescriptions, schedule appointments and more. To sign up, go to [email or web address] . Click on \"Log in\" on the left side of the screen, which will take you to the Welcome page. Then click on \"Sign up Now\" on the right side of the page.     You will be asked to enter the access code listed below, as well as some personal information. Please follow the directions to create your username and password.     Your access code is: [email or web address]  Expires: 2018 10:25 AM     Your access code will  in 90 days. If you need help or a new code, please call your [facility] clinic or [phone].        Care EveryWhere ID     This is your Care EveryWhere ID. This could be used by other organizations to access your [facility] medical records  [id number]        Equal Access to Services     SUSAN XAVIER AH: Hadtyrone Grace, waaxda lunoadaha, qaybta kaalizabella perry, delia le. So [facility] [phone].    ATENCIÓN: Si habla español, tiene a quiroz disposición servicios gratuitos de asistencia lingüística. Lee al [phone].    We comply with applicable federal civil rights laws and [place] laws. We do not discriminate on the basis of race, color, national origin, age, disability, sex, sexual orientation, or gender identity.               Review of your medicines      START taking        Dose / Directions    amLODIPine 5 MG tablet   Commonly known as:  NORVASC   Used for:  Essential hypertension, " benign        Dose:  5 mg   Take 1 tablet (5 mg) by mouth daily (with dinner)   Quantity:  30 tablet   Refills:  0       enoxaparin 80 MG/0.8ML injection   Commonly known as:  LOVENOX   Used for:  PE (pulmonary thromboembolism) (H)        Dose:  70 mg   Inject 0.7 mLs (70 mg) Subcutaneous every 12 hours for 5 days   Quantity:  10 Syringe   Refills:  0       warfarin 5 MG tablet   Commonly known as:  COUMADIN   Used for:  PE (pulmonary thromboembolism) (H)        Dose:  5 mg   Take 1 tablet (5 mg) by mouth daily Take at 6 pm every day until otherwise directed by Coumadin Clinic   Quantity:  30 tablet   Refills:  0         CONTINUE these medicines which have NOT CHANGED        Dose / Directions    ASPIRIN PO        Dose:  81 mg   Take 81 mg by mouth daily   Refills:  0       doxazosin 8 MG tablet   Commonly known as:  CARDURA   Used for:  Essential hypertension, benign        Dose:  8 mg   Take 1 tablet (8 mg) by mouth daily   Quantity:  90 tablet   Refills:  3            Where to get your medicines      These medications were sent to [id number] - [place], [place] - [address]  [address] [address] [facility] [address], [place] [address]     Phone:  [phone]     amLODIPine 5 MG tablet    enoxaparin 80 MG/0.8ML injection    warfarin 5 MG tablet                Protect others around you: Learn how to safely use, store and throw away your medicines at www.disposemymeds.org.             Medication List: This is a list of all your medications and when to take them. Check marks below indicate your daily home schedule. Keep this list as a reference.      Medications           Morning Afternoon Evening Bedtime As Needed    amLODIPine 5 MG tablet   Commonly known as:  NORVASC   Take 1 tablet (5 mg) by mouth daily (with dinner)   Last time this was given:  2.5 mg on 1/12/2018  6:42 PM                                ASPIRIN PO   Take 81 mg by mouth daily   Last time this was given:  81 mg on 1/13/2018  8:36 AM                                 doxazosin 8 MG tablet   Commonly known as:  CARDURA   Take 1 tablet (8 mg) by mouth daily   Last time this was given:  8 mg on 1/13/2018  9:11 AM                                enoxaparin 80 MG/0.8ML injection   Commonly known as:  LOVENOX   Inject 0.7 mLs (70 mg) Subcutaneous every 12 hours for 5 days   Last time this was given:  70 mg on 1/13/2018  8:35 AM                                warfarin 5 MG tablet   Commonly known as:  COUMADIN   Take 1 tablet (5 mg) by mouth daily Take at 6 pm every day until otherwise directed by Coumadin Clinic   Last time this was given:  5 mg on 1/12/2018  6:42 PM                                          More Information        Pulmonary Embolism (PE)  A pulmonary embolus is most often due to a blood clot that develops in a deep vein of the leg (deep vein thrombosis). If that clot, breaks loose and travels to the lung, it is called a pulmonary embolism (PE). This can cut off the flow of blood in the lungs.  A blood clot in the lungs is a medical emergency and may cause death.   Healthcare providers use the term venous thromboembolism (VTE) to describe these 2 conditions: deep vein thrombosis and pulmonary embolism. They use the term VTE because the 2 conditions are very closely related. And, because their prevention and treatment are also closely related.      A pulmonary embolism occurs when a blood clot forms in a vein and travels to the lungs.   How is pulmonary embolism diagnosed?  Your healthcare provider examines you and asks about your symptoms and health history. You may also have one or more of the following:    Blood tests to check for blood clotting or other problems    Imaging tests to look for clots in the veins or lung    Electrocardiography (ECG) to test how well the heart is working  How is pulmonary embolism treated?    Blood-thinning medicines (anticoagulants). These medicines thin the blood. They may be given as a pill, as an injection, or through a tube into a  vein (intravenous or IV). Blood thinners help prevent more blood clots from forming. They also help to prevent an existing clot from getting larger.    Thrombolysis. Thrombolytic medicines are used to quickly dissolve a blood clot. A long, narrow tube (catheter) is used to deliver medicine directly to the clot. Thrombolytic medicines increase the risk of bleeding so they are used very carefully.    Inferior vena cava (IVC) filter surgery. The vena cava is the body s largest vein. It carries blood from the body to the heart. A small filter traps blood clots in the lower body and prevents them from traveling to the lungs. The filter is inserted into the vein through a catheter. The filter may be used if blood thinners cannot be taken or if they don't work.     Pulmonary embolectomy. This is a procedure to remove a blood clot in the lungs. It may be done with surgery or with a catheter inserted in the body. It may be done when other treatments aren't safe or don't work.  What are the long-term concerns?  With treatment, blood clots are usually dissolved or removed. Some treatments can even help prevent future clots. But having a PE can put you at risk for another life-threatening blood clot. So, you will likely need to take anticoagulants to help keep blood clots from forming again. You may need to take this medicine for months or years.  You may also need to make lifestyle changes. This may include getting more active and eating healthier. You may need to wear elastic (compression) stockings and and take breaks on long trips.  Call 911  Call 911 or get emergency help if you have symptoms of a blood clot that has traveled to the lungs. The symptoms include:    Chest pain    Trouble breathing    Coughing (may cough up blood)    Fainting    Fast heartbeat    Sweating  Call 911 if you have heavy or uncontrolled bleeding.  When to call your healthcare provider  Call your healthcare provider if you have swelling or pain in  your leg, arm, or other area. These are symptoms of a blood clot.  You may have bleeding if you take medicine to help prevent blood clots.  Call your healthcare provider if you have signs or symptoms of bleeding. This includes:    Blood in the urine    Bleeding with bowel movements    Bleeding from the nose, gums, a cut, or vagina   Date Last Reviewed: 2/1/2017 2000-2017 [employer]. [address] [place] [address]. All rights reserved. This information is not intended as a substitute for professional medical care. Always follow your healthcare professional's instructions.                Discharge Instructions for Pulmonary Embolism  A deep vein thrombosis (DVT) is a blood clot in a large vein deep in a leg, arm, or elsewhere in the body. The clot can separate from the vein, travel to the lungs and cut off blood flow. This is a pulmonary embolism (PE). Pulmonary embolism is very serious and may cause death.   Healthcare providers use the term venous thromboembolism (VTE) to describe both DVT and PE. They use the term VTE because the two conditions are very closely related. And, because their prevention and treatment are closely related.   Home care  Taking care of yourself is very important. To help prevent more blood clots from forming, follow your healthcare provider's instructions. Do the following:    Take your medicines exactly as instructed. Don t skip doses. If you miss a dose, call your healthcare provider and ask what you should do.      Have all lab tests as recommended. This is very important when you take medicines to prevent blood clots.     If your healthcare provider has instructed you to do so, wear elastic (compression stockings).    Get up and get moving.    While sitting for long periods of time, move your knees, ankles, feet, and toes.  Lifestyle changes  To help prevent problems with your heart and blood vessels, do the following:     If you smoke, get help to quit.  Talk with your healthcare provider about medicines and programs that can help.    Stay at a healthy weight. Talk to your healthcare provider about losing weight, if you are overweight    Try to exercise at least 30 minutes on most days. Before starting an exercise program, talk with your healthcare provider.     When traveling by car, make frequent stops to get up and move around.    On long airplane rides, get up and move around when possible. If you can t get up, wiggle your toes, move your ankles and tighten your calves to keep your blood moving.  Follow-up care  Make a follow-up appointment as directed  Have your lab work done as directed.     When to seek medical advice  Call your healthcare provider if you have pain, swelling, and redness in your leg, arm, or other body area. These symptoms may mean another blood clot.  And, call your healthcare provider if you have signs and symptoms of bleeding, like blood in your urine, bleeding with bowel movements, or bleeding from the nose, gums, a cut, or vagina.   Call 911  Call 911 or get emergency help if you have symptoms of a blood clot in the lungs including:     Chest pain    Trouble breathing    Coughing (may cough up blood)    Fast heartbeat    Sweating    Fainting  Also call 911 if you have:    Heavy or uncontrolled bleeding. If you are taking a blood thinner, you have an increased chance of bleeding.   Date Last Reviewed: 2/1/2017 2000-2017 [employer]. [address] [place] [address]. All rights reserved. This information is not intended as a substitute for professional medical care. Always follow your healthcare professional's instructions.                Enoxaparin Sodium Solution for injection  What is this medicine?  ENOXAPARIN (ee nox a PA rin) is used after knee, hip, or abdominal surgeries to prevent blood clotting. It is also used to treat existing blood clots in the lungs or in the veins.  This medicine may be used for  other purposes; ask your health care provider or pharmacist if you have questions.  What should I tell my health care provider before I take this medicine?  They need to know if you have any of these conditions:    bleeding disorders, hemorrhage, or hemophilia    infection of the heart or heart valves    kidney or liver disease    previous stroke    prosthetic heart valve    recent surgery or delivery of a baby    ulcer in the stomach or intestine, diverticulitis, or other bowel disease    an unusual or allergic reaction to enoxaparin, heparin, pork or pork products, other medicines, foods, dyes, or preservatives    pregnant or trying to get pregnant    breast-feeding  How should I use this medicine?  This medicine is for injection under the skin. It is usually given by a health-care professional. You or a family member may be trained on how to give the injections. If you are to give yourself injections, make sure you understand how to use the syringe, measure the dose if necessary, and give the injection. To avoid bruising, do not rub the site where this medicine has been injected. Do not take your medicine more often than directed. Do not stop taking except on the advice of your doctor or health care professional.  Make sure you receive a puncture-resistant container to dispose of the needles and syringes once you have finished with them. Do not reuse these items. Return the container to your doctor or health care professional for proper disposal.  Talk to your pediatrician regarding the use of this medicine in children. Special care may be needed.  Overdosage: If you think you have taken too much of this medicine contact a poison control center or emergency room at once.  NOTE: This medicine is only for you. Do not share this medicine with others.  What if I miss a dose?  If you miss a dose, take it as soon as you can. If it is almost time for your next dose, take only that dose. Do not take double or extra  doses.  What may interact with this medicine?    aspirin and aspirin-like medicines    certain medicines that treat or prevent blood clots    dipyridamole    NSAIDs, medicines for pain and inflammation, like ibuprofen or naproxen  This list may not describe all possible interactions. Give your health care provider a list of all the medicines, herbs, non-prescription drugs, or dietary supplements you use. Also tell them if you smoke, drink alcohol, or use illegal drugs. Some items may interact with your medicine.  What should I watch for while using this medicine?  Visit your doctor or health care professional for regular checks on your progress. Your condition will be monitored carefully while you are receiving this medicine.  Notify your doctor or health care professional and seek emergency treatment if you develop breathing problems; changes in vision; chest pain; severe, sudden headache; pain, swelling, warmth in the leg; trouble speaking; sudden numbness or weakness of the face, arm, or leg. These can be signs that your condition has gotten worse.  If you are going to have surgery, tell your doctor or health care professional that you are taking this medicine.  Do not stop taking this medicine without first talking to your doctor. Be sure to refill your prescription before you run out of medicine.  Avoid sports and activities that might cause injury while you are using this medicine. Severe falls or injuries can cause unseen bleeding. Be careful when using sharp tools or knives. Consider using an electric razor. Take special care brushing or flossing your teeth. Report any injuries, bruising, or red spots on the skin to your doctor or health care professional.  What side effects may I notice from receiving this medicine?  Side effects that you should report to your doctor or health care professional as soon as possible:    allergic reactions like skin rash, itching or hives, swelling of the face, lips, or  tongue    feeling faint or lightheaded, falls    signs and symptoms of bleeding such as bloody or black, tarry stools; red or dark-brown urine; spitting up blood or brown material that looks like coffee grounds; red spots on the skin; unusual bruising or bleeding from the eye, gums, or nose  Side effects that usually do not require medical attention (report to your doctor or health care professional if they continue or are bothersome):    pain, redness, or irritation at site where injected  This list may not describe all possible side effects. Call your doctor for medical advice about side effects. You may report side effects to FDA at 0-818-HLK-4986.  Where should I keep my medicine?  Keep out of the reach of children.  Store at room temperature between 15 and 30 degrees C (59 and 86 degrees F). Do not freeze. If your injections have been specially prepared, you may need to store them in the refrigerator. Ask your pharmacist. Throw away any unused medicine after the expiration date.  NOTE:This sheet is a summary. It may not cover all possible information. If you have questions about this medicine, talk to your doctor, pharmacist, or health care provider. Copyright  2016 [employer]                Flu Vaccines for Adults   The flu (influenza) is caused by a virus that is easily spread. A flu vaccine protects you and others from the flu. It s best to get a flu shot every year in late summer or early fall, as soon as the vaccine is available in your area. You can get it at your healthcare provider s office or a health clinic. Pharmacies, senior centers, and workplaces often offer flu shots, too. If you want to know if your provider has the flu vaccine available, or if you have other questions, ask your healthcare provider.    Flu facts    The flu shot won t give you the flu. The virus that is in the flu shot has been killed (inactivated).    The flu can be dangerous--even life-threatening. Every year thousands of  people die from complications from the flu.    The flu is caused by a virus. It can t be treated with antibiotics.    Influenza is not the same as stomach flu, the 24-hour virus that causes vomiting and diarrhea. The stomach flu most likely happens because of a GI (gastrointestinal) infection, not the flu.    You need to get a flu shot each year.   Flu symptoms  Flu symptoms tend to come on quickly. They include:    Fever    Headache    Tiredness (fatigue)    Cough    Sore throat    Runny nose    Muscle aches  Upset stomach and vomiting are not common for adults. Some symptoms such as tiredness and cough may last for many weeks.  How a flu vaccine protects you  There are many types (strains) of the flu virus. Medical experts predict which strains are most likely to make people sick each year. Flu shots are made from these strains. When you get a flu vaccine, killed (inactivated) viruses are injected into your body. These can t give you the flu. But they do cause your body to make antibodies to fight these flu strains. If you are exposed to the same strains later in the flu season, the antibodies will fight off the germs.  Who should get the flu vaccine?  The CDC recommends that infants over the age of 6 months and all children and adults should get a flu shot every year.  Some people are at an increased risk of developing serious complications from the flu. It is extremely important that these people get the vaccine. They include those with:    Long-term heart and lung conditions    Other serious health conditions such as:    Endocrine disorders such as diabetes    Kidney or liver disorders    Weakened immune system from disease or medical treatment. For example, people with HIV or AIDS, or those taking long-term steroids or medicines to treat cancer.    Blood disorders such as sickle cell disease  It is also very important that others who have an increased risk of being exposed to the flu or are around people with  increased risk for complications get the vaccine. This includes:    Healthcare providers and other staff who provide care in hospitals, nursing homes, home health, and other facilities    Household members, including children of people in high-risk groups  Types of flu vaccines  The flu vaccine is available as a regular and a high-strength shot. Your healthcare provider will recommend the vaccine that is best for you.  Flu shot  The flu shot is available in a few different forms. Your healthcare provider will determine which vaccine is right for you. There is a high-dose vaccine for those over age 65 and a vaccine for those with egg allergies. It is safe for most people. Talk with your provider if you have had:    A severe allergic reaction to a previous flu vaccine    Guillain-Barré syndrome. This is a severe paralyzing condition.  Nasal spray  The nasal spray is not recommended for the 7431-5736 flu season. The CDC says this is because the nasal spray did not seem to protect against the flu over the last several flu seasons. In the past, it was meant for people ages 2 to 49.  Date Last Reviewed: 12/1/2016 2000-2017 [employer]. [address] [place] [address]. All rights reserved. This information is not intended as a substitute for professional medical care. Always follow your healthcare professional's instructions.                Pneumococcal Vaccination  There are 2 pneumococcal vaccinations that protect people against pneumococcal disease.  Pneumococcal disease  Pneumococcal disease is caused by bacteria (Streptococcus pneumoniae). This germ is easily spread when someone with the bacteria coughs, sneezes, laughs, or talks. You can get pneumococcal disease more than once. This is because there are many different types (strains) of the bacteria. Some strains are also resistant to treatment with antibiotics.  There are different kinds of pneumococcal disease, depending on what part of  the body is infected. They include:    Pneumonia. Infection in the lungs.    Meningitis. Infection of the covering of the brain and spinal cord.    Otitis media. Infection of the middle ear.    Bacteremia or septicemia. Infection in the blood.  Pneumococcal disease can be life-threatening, especially for people in high-risk groups. Each year, thousands of people die of this disease. Thousands more become seriously ill.  The vaccine    The pneumococcal vaccines are the best way to avoid pneumococcal disease. They are safe and effective. The vaccines are given as shots (injections). This can be done at your healthcare provider's office or a health clinic. Drugstores, [facility] centers, and workplaces often offer vaccinations, too. If you have questions, ask your healthcare provider.  The pneumococcal vaccines are recommended for:    Persons 65 and older    Infants    People with chronic health problems (such as diabetes, chronic lung or heart disease, liver disease)    People who have a cochlear implant    People who have weakened immune systems    People who live in nursing homes or other long-term care facilities    People who smoke or have asthma  They are given:    In a 4-dose series in infants    One time in adults; some people need a second dose of one of the vaccines  Your healthcare provider can tell you more about the vaccines, whether you should get them, and the number of shots you should get.  Date Last Reviewed: 11/1/2016 2000-2017 [employer]. [address] [place] [address], [place] [address]. All rights reserved. This information is not intended as a substitute for professional medical care. Always follow your healthcare professional's instructions.                Pneumococcal Vaccine, Polyvalent solution for injection  Brand Name: Pneumovax 23  What is this medicine?  PNEUMOCOCCAL VACCINE, POLYVALENT (REY mo MEIR al ro WHITT, raquel yohan evans) is a vaccine to prevent pneumococcus bacteria  infection. These bacteria are a major cause of ear infections, Strep throat infections, and serious pneumonia, meningitis, or blood infections worldwide. These vaccines help the body to produce antibodies (protective substances) that help your body defend against these bacteria. This vaccine is recommended for people 2 years of age and older with health problems. It is also recommended for all adults over 50 years old. This vaccine will not treat an infection.  How should I use this medicine?  This vaccine is for injection into a muscle or under the skin. It is given by a health care professional.  A copy of Vaccine Information Statements will be given before each vaccination. Read this sheet carefully each time. The sheet may change frequently.  Talk to your pediatrician regarding the use of this medicine in children. While this drug may be prescribed for children as young as 2 years of age for selected conditions, precautions do apply.  What side effects may I notice from receiving this medicine?  Side effects that you should report to your doctor or health care professional as soon as possible:    allergic reactions like skin rash, itching or hives, swelling of the face, lips, or tongue    breathing problems    confused    fever over 102 degrees F    pain, tingling, numbness in the hands or feet    seizures    unusual bleeding or bruising    unusual muscle weakness  Side effects that usually do not require medical attention (report to your doctor or health care professional if they continue or are bothersome):    aches and pains    diarrhea    fever of 102 degrees F or less    headache    irritable    loss of appetite    pain, tender at site where injected    trouble sleeping  What may interact with this medicine?    medicines for cancer chemotherapy    medicines that suppress your immune function    medicines that treat or prevent blood clots like warfarin, enoxaparin, and dalteparin    steroid medicines like  prednisone or cortisone  What if I miss a dose?  It is important not to miss your dose. Call your doctor or health care professional if you are unable to keep an appointment.  Where should I keep my medicine?  This does not apply. This vaccine is given in a clinic, pharmacy, doctor's office, or other health care setting and will not be stored at home.  What should I tell my health care provider before I take this medicine?  They need to know if you have any of these conditions:    bleeding problems    bone marrow or organ transplant    cancer, Hodgkin's disease    fever    infection    immune system problems    low platelet count in the blood    seizures    an unusual or allergic reaction to pneumococcal vaccine, diphtheria toxoid, other vaccines, latex, other medicines, foods, dyes, or preservatives    pregnant or trying to get pregnant    breast-feeding  What should I watch for while using this medicine?  Mild fever and pain should go away in 3 days or less. Report any unusual symptoms to your doctor or health care professional.  NOTE:This sheet is a summary. It may not cover all possible information. If you have questions about this medicine, talk to your doctor, pharmacist, or health care provider. Copyright  2017 [employer]                Taking Coumadin - CORE MEASURES  Coumadin (warfarin) helps keep your blood from clotting. It is used to reduce the risk for stroke, heart attack, or a blood clot passing to your lung. Coumadin also increases your risk of bleeding. Because of this, it must be taken exactly as directed by your doctor. You also need to protect yourself from injury.    Follow These Tips    Take this medicine at the same time each day. Take it with a full glass of water, with or without food. If you miss a dose, contact your doctor immediately to find out how much to take. Never take a double dose.    Warfarin is an effective drug, but it can be dangerous if not taken properly. It makes your blood  less likely to form clots. If you take too much, it can cause too much internal or external bleeding.    Be sure to tell all of your doctors that you take Coumadin. If you will be taking Coumadin for quite a while, carry an ID card or get a Medic-Alert bracelet. This will alert medical staff in case you aren t able to do so yourself. Also carry with you the name and number of the person to contact in case of an emergency.    You will need to have regular blood tests to measure the effects of the warfarin. Keep your scheduled test appointment and be sure to talk with your doctor afterward to find out your results. Your doctor may need to change your dose. Follow your doctor s advice exactly about how to take this medicine. Do not stop the medicine without talking with your doctor.  Monitoring Your PT/INR Blood Levels After Discharge  Two tests are used to find out how your blood is clotting. One is protime (PT) and the other is the international normalized ratio (INR).    Go for your blood (PT/INR) tests as often as directed. Note that diet and medication can affect your PT/INR level.    Your INR was between _____ and _____ .    Ask your doctor what your goal INR is. My goal INR is between _____ and _____.    My next PT/INR blood draw is due on _________________ (date) at ________________ (time) by ____________________ (name of doctor or clinic).    The name of the doctor who is monitoring my anticoagulation therapy is ______________________ and the phone number is ___________________.    Follow up with your doctor or as advised by his or her staff. It usually takes a few hours for your doctor to get the results of your clotting tests. Please call to get your lab results and find out if your doctor needs to make further changes to your Coumadin dose.    If your labs (PT/INR) are drawn at a location other than your doctor's office, please remember to tell your doctor as soon as you get your lab results.      What to  Do at Home  1. Adjust your Coumadin dose as directed by your doctor, ____________________ (name of doctor).  2. Have another clotting test done every _______ days at _____________________ (name of clinic) by ____________________ (name of doctor).  3. Do not go barefoot. Always wear shoes.  4. Do not trim corns or calluses yourself.  5. Always talk with your doctor before taking any herbs, vitamins, or prescription or over-the-counter (OTC) medications, especially aspirin and nonsteroidal anti-inflammatory drugs.  6. Always talk with your doctor before stopping any medication or changing the dose of any of your medications.  7. Use an electric razor instead of a manual one.  8. Use a soft-bristled toothbrush and waxed floss.  9. Avoid major changes in your diet.      When to Call Your Health Care Provider  Coumadin increases your risk of bleeding. Call your health care provider right away before you take your next dose of Coumadin if you have any of these problems:    Bleeding that doesn t stop in 10 minutes    A heavier-than-normal period or bleeding between periods    Coughing or throwing up blood or something that looks like coffee grounds    Nausea, bloating, diarrhea, or bleeding hemorrhoids    Bleeding hemorrhoids    Dark red or brown urine    Red or black tarry stools    Red or black-and-blue marks on the skin that get larger    A fever or an illness that gets worse    Dizziness, headache, weakness, or fatigue    Chest pain or trouble breathing    A serious fall or a blow to the head    Swelling or pain after an injury or at an injection site    Bleeding gums after brushing your teeth  Keep Your Diet Steady  Keep your diet pretty much the same each day. That s because many foods contain vitamin K. Vitamin K helps your blood clot. So eating foods that contain vitamin K can affect the way Coumadin works. You don t need to avoid foods that have vitamin K. But you do need to keep the amount of them you eat steady  (about the same day to day). If you change your diet for any reason, such as due to illness or to lose weight, be sure to tell your doctor.    Examples of foods high in vitamin K are asparagus, avocado, broccoli, cabbage, kale, spinach, and some other leafy green vegetables. Oils, such as soybean, canola, and olive oils, are also high in vitamin K.    Other food products can affect the way Coumadin works in your body:    Food products that may affect blood clotting include cranberries and cranberry juice, fish oil supplements, garlic, taz, licorice, and turmeric.    Herbs used in herbal teas or supplements can also affect blood clotting. Keep the amount of herbal teas and supplements you use steady.    Alcohol can increase the effect of Coumadin in your body.  Talk with your health care provider if you have concerns about these or other food products and their effects on Coumadin.  What to Watch For  If you have any of these signs or reactions, call your doctor right away or go to the hospital.  Signs of too much bleeding:    More bruising than normal    Prolonged bleeding from cuts    Bleeding from the nose or gums    Blood in your urine, vomit, or stools (red or black color)    Coughing up blood    Unusually heavy menstrual bleeding    Sudden change to a dark or purplish color in your toes or any other area of your body  Allergic Reactions:    Rash    Itching    Swelling    Trouble swallowing or breathing  ---------- IMPORTANT ----------  Medical Conditions  Before starting this medicine, be sure your doctor knows if you have any of these conditions:    Stomach ulcer now or in the past    Vomited blood or had bloody stools (black or red color)    Aneurysm, pericarditis, or pericardial effusion    Blood disorder    Recent surgery, stroke, mini-stroke, or spinal puncture    Kidney or liver disease, uncontrolled high blood pressure, diabetes, vasculitis, congestive heart failure, lupus or other collagen-vascular  disease, or high cholesterol    Pregnancy or breastfeeding    Younger than 18 years old    Recent or planned dental procedure  Drug Interactions  Many medicines interfere with the effect of Coumadin. Before starting this medicine, be sure your doctor knows about any prescription, OTC, or herbal drugs you are taking. This is especially true if you are taking:    Antibiotics    Heart medicines    Cimetidine (Tagamet)    Aspirin or other anti-inflammatory drugs such as ibuprofen (Advil, Motrin, or Nuprin), naproxen (Aleve, Naprosyn, Anaprox), ketoprofen (Orudis, Oruvail), or other arthritis medicines    Drugs for depression, cancer, HIV (protease inhibitors), diabetes, seizures, gout, high cholesterol, or thyroid replacement    Vitamins containing Vitamin K or herbal products such as ginkgo, Q10, garlic, or Prema's wort  [NOTE: This information topic may not include all directions, precautions, medical conditions, drug/food interactions, and warnings for this drug. Check with your doctor, nurse, or pharmacist for any questions that you may have.]          4063-4452 [employer]. [address] [place] [address]. All rights reserved. This information is not intended as a substitute for professional medical care. Always follow your healthcare professional's instructions.  This information has been modified by your health care provider with permission from the publisher.                Warfarin tablets  Brand Names: Coumadin, Jantoven  What is this medicine?  WARFARIN (WAR far in) is an anticoagulant. It is used to treat or prevent clots in the veins, arteries, lungs, or heart.  How should I use this medicine?  Take this medicine by mouth with a glass of water. Follow the directions on the prescription label. You can take this medicine with or without food. Take your medicine at the same time each day. Do not take it more often than directed. Do not stop taking except on your doctor's advice.  Stopping this medicine may increase your risk of a blood clot. Be sure to refill your prescription before you run out of medicine.  If your doctor or healthcare professional calls to change your dose, write down the dose and any other instructions. Always read the dose and instructions back to him or her to make sure you understand them. Tell your doctor or healthcare professional what strength of tablets you have on hand. Ask how many tablets you should take to equal your new dose. Write the date on the new instructions and keep them near your medicine. If you are told to stop taking your medicine until your next blood test, call your doctor or healthcare professional if you do not hear anything within 24 hours of the test to find out your new dose or when to restart your prior dose.  A special MedGuide will be given to you by the pharmacist with each prescription and refill. Be sure to read this information carefully each time.  Talk to your pediatrician regarding the use of this medicine in children. Special care may be needed.  What side effects may I notice from receiving this medicine?  Side effects that you should report to your doctor or health care professional as soon as possible:    allergic reactions like skin rash, itching or hives, swelling of the face, lips, or tongue    breathing problems    chest pain    dizziness    headache    heavy menstrual bleeding or vaginal bleeding    pain in the lower back or side    painful, blue or purple toes    painful skin ulcers that do not go away    signs and symptoms of bleeding such as bloody or black, tarry stools; red or dark-brown urine; spitting up blood or brown material that looks like coffee grounds; red spots on the skin; unusual bruising or bleeding from the eye, gums, or nose    stomach pain    unusually weak or tired  Side effects that usually do not require medical attention (report to your doctor or health care professional if they continue or are  bothersome):    diarrhea    hair loss  What may interact with this medicine?  Do not take this medicine with any of the following medications:    agents that prevent or dissolve blood clots    aspirin or other salicylates    danshen    dextrothyroxine    mifepristone    Prema's Wort    red yeast rice  This medicine may also interact with the following medications:    acetaminophen    agents that lower cholesterol    alcohol    allopurinol    amiodarone    antibiotics or medicines for treating bacterial, fungal or viral infections    azathioprine    barbiturate medicines for inducing sleep or treating seizures    certain medicines for diabetes    certain medicines for heart rhythm problems    certain medicines for high blood pressure    chloral hydrate    cisapride    disulfiram    female hormones, including contraceptive or birth control pills    general anesthetics    herbal or dietary products like garlic, ginkgo, ginseng, green tea, or kava kava    influenza virus vaccine    male hormones    medicines for mental depression or psychosis    medicines for some types of cancer    medicines for stomach problems    methylphenidate    NSAIDs, medicines for pain and inflammation, like ibuprofen or naproxen    propoxyphene    quinidine, quinine    raloxifene    seizure or epilepsy medicine like carbamazepine, phenytoin, and valproic acid    steroids like cortisone and prednisone    tamoxifen    thyroid medicine    tramadol    vitamin c, vitamin e, and vitamin K    zafirlukast    zileuton  What if I miss a dose?  It is important not to miss a dose. If you miss a dose, call your healthcare provider. Take the dose as soon as possible on the same day. If it is almost time for your next dose, take only that dose. Do not take double or extra doses to make up for a missed dose.  Where should I keep my medicine?  Keep out of the reach of children.  Store at room temperature between 15 and 30 degrees C (59 and 86 degrees F).  Protect from light. Throw away any unused medicine after the expiration date. Do not flush down the toilet.  What should I tell my health care provider before I take this medicine?  They need to know if you have any of these conditions:    alcoholism    anemia    bleeding disorders    cancer    diabetes    heart disease    high blood pressure    history of bleeding in the gastrointestinal tract    history of stroke or other brain injury or disease    kidney or liver disease    protein C deficiency    protein S deficiency    psychosis or dementia    recent injury, recent or planned surgery or procedure    an unusual or allergic reaction to warfarin, other medicines, foods, dyes, or preservatives    pregnant or trying to get pregnant    breast-feeding  What should I watch for while using this medicine?  Visit your doctor or health care professional for regular checks on your progress. You will need to have a blood test called a PT/INR regularly. The PT/INR blood test is done to make sure you are getting the right dose of this medicine. It is important to not miss your appointment for the blood tests. When you first start taking this medicine, these tests are done often. Once the correct dose is determined and you take your medicine properly, these tests can be done less often.  Notify your doctor or health care professional and seek emergency treatment if you develop breathing problems; changes in vision; chest pain; severe, sudden headache; pain, swelling, warmth in the leg; trouble speaking; sudden numbness or weakness of the face, arm or leg. These can be signs that your condition has gotten worse.  While you are taking this medicine, carry an identification card with your name, the name and dose of medicine(s) being used, and the name and phone number of your doctor or health care professional or person to contact in an emergency.  Do not start taking or stop taking any medicines or over-the-counter medicines  except on the advice of your doctor or health care professional.  You should discuss your diet with your doctor or health care professional. Do not make major changes in your diet. Vitamin K can affect how well this medicine works. Many foods contain vitamin K. It is important to eat a consistent amount of foods with vitamin K. Other foods with vitamin K that you should eat in consistent amounts are asparagus, basil, beef or pork liver, black eyed peas, broccoli, brussel sprouts, cabbage, chickpeas, cucumber with peel, green onions, green tea, okra, parsley, peas, thyme, and green leafy vegetables like beet greens, consuelo greens, endive, kale, mustard greens, spinach, turnip greens, watercress, or certain lettuces like green leaf or bernadette.  This medicine can cause birth defects or bleeding in an unborn child. Women of childbearing age should use effective birth control while taking this medicine. If a woman becomes pregnant while taking this medicine, she should discuss the potential risks and her options with her health care professional.  Avoid sports and activities that might cause injury while you are using this medicine. Severe falls or injuries can cause unseen bleeding. Be careful when using sharp tools or knives. Consider using an electric razor. Take special care brushing or flossing your teeth. Report any injuries, bruising, or red spots on the skin to your doctor or health care professional.  If you have an illness that causes vomiting, diarrhea, or fever for more than a few days, contact your doctor. Also check with your doctor if you are unable to eat for several days. These problems can change the effect of this medicine.  Even after you stop taking this medicine, it takes several days before your body recovers its normal ability to clot blood. Ask your doctor or health care professional how long you need to be careful. If you are going to have surgery or dental work, tell your doctor or health care  professional that you have been taking this medicine.  NOTE:This sheet is a summary. It may not cover all possible information. If you have questions about this medicine, talk to your doctor, pharmacist, or health care provider. Copyright  2017 [employer]                Amlodipine tablets  Brand Name: Norvasc  What is this medicine?  AMLODIPINE (am JACK hickey) is a calcium-channel blocker. It affects the amount of calcium found in your heart and muscle cells. This relaxes your blood vessels, which can reduce the amount of work the heart has to do. This medicine is used to lower high blood pressure. It is also used to prevent chest pain.  How should I use this medicine?  Take this medicine by mouth with a glass of water. Follow the directions on the prescription label. Take your medicine at regular intervals. Do not take more medicine than directed.  Talk to your pediatrician regarding the use of this medicine in children. Special care may be needed. This medicine has been used in children as young as 6.  Persons over 65 years old may have a stronger reaction to this medicine and need smaller doses.  What side effects may I notice from receiving this medicine?  Side effects that you should report to your doctor or health care professional as soon as possible:    allergic reactions like skin rash, itching or hives, swelling of the face, lips, or tongue    breathing problems    changes in vision or hearing    chest pain    fast, irregular heartbeat    swelling of legs or ankles  Side effects that usually do not require medical attention (report to your doctor or health care professional if they continue or are bothersome):    dry mouth    facial flushing    nausea, vomiting    stomach gas, pain    tired, weak    trouble sleeping  What may interact with this medicine?    herbal or dietary supplements    local or general anesthetics    medicines for high blood pressure    medicines for prostate problems    rifampin  What  if I miss a dose?  If you miss a dose, take it as soon as you can. If it is almost time for your next dose, take only that dose. Do not take double or extra doses.  Where should I keep my medicine?  Keep out of the reach of children.  Store at room temperature between 59 and 86 degrees F (15 and 30 degrees C). Protect from light. Keep container tightly closed. Throw away any unused medicine after the expiration date.  What should I tell my health care provider before I take this medicine?  They need to know if you have any of these conditions:    heart problems like heart failure or aortic stenosis    liver disease    an unusual or allergic reaction to amlodipine, other medicines, foods, dyes, or preservatives    pregnant or trying to get pregnant    breast-feeding  What should I watch for while using this medicine?  Visit your doctor or health care professional for regular check ups. Check your blood pressure and pulse rate regularly. Ask your health care professional what your blood pressure and pulse rate should be, and when you should contact him or her.  This medicine may make you feel confused, dizzy or lightheaded. Do not drive, use machinery, or do anything that needs mental alertness until you know how this medicine affects you. To reduce the risk of dizzy or fainting spells, do not sit or stand up quickly, especially if you are an older patient. Avoid alcoholic drinks; they can make you more dizzy.  Do not suddenly stop taking amlodipine. Ask your doctor or health care professional how you can gradually reduce the dose.  NOTE:This sheet is a summary. It may not cover all possible information. If you have questions about this medicine, talk to your doctor, pharmacist, or health care provider. Copyright  2017 [employer]                Taking Amlodipine  Amlodipine (nn-QK-oz-peen) is a calcium channel blocker. It helps relax your blood vessels and get more blood and oxygen to your heart. Relaxing the blood  vessels also helps lower your blood pressure and relieve any chest pain you may have.     Each time you take your medicine, missael it on the calendar so you won't forget.     Medication tips    Read the fact sheet that comes with your medicine. It tells you when and how to take it. Ask for a sheet if you don t get one.    Take your medicine at the same time each day. If it upsets your stomach, take it with food or milk.    If you miss a dose, take it as soon as you remember -- unless it is almost time for your next dose. If so, skip the missed dose. Do not take a double dose.    Call your doctor or pharmacist if you have any questions about taking your medicine.    Take your medicine even if you feel well. Most people with high blood pressure don t feel sick.  For your safety    Ask your doctor or pharmacist if there are any foods or medicines you should avoid.    To prevent dizziness, get up slowly after sitting or lying down.    Do not stop taking your medicine unless your doctor tells you to. Doing so can make your condition worse. When stopping this medicine, the dose may need to be slowly decreased.    Tell your doctor or pharmacist before taking any other prescription or over-the-counter medicines. This includes vitamin or mineral supplements and herbal remedies.    Talk to your doctor or pharmacist about whether drinking alcohol is safe while taking amlodipine.    Be sure to refill your prescription before you run out.    Do not share your medicine with anyone.    Ask your doctor how often you should have your blood pressure checked.  When to seek medical advice  Call your healthcare provider right away if any of these occur:    You notice swelling in your ankles or feet or your skin flushes    You have a headache or nausea    You feel tired or weak    You have severe dizziness    You feel chest pain    You have trouble breathing    You develop a skin rash or itching   Date Last Reviewed: 6/1/2016 2000-2017  [employer]. [address] [place] [address]. All rights reserved. This information is not intended as a substitute for professional medical care. Always follow your healthcare professional's instructions.                * Deep Vein Thrombosis    Deep Vein Thrombosis (DVT) means there is a blood clot in a deep vein of the leg. This may cause redness, swelling, warmth and pain of the leg. If the blood clot grows larger, a piece may break off and go to the lungs (pulmonary embolus)  Factors that increase risk of a DVT include: overweight, smoking, use of estrogen replacement therapy. Prolonged periods without movement (such as long distance travel, wearing a fracture cast, and prolonged bed rest after surgery or during an illness) also increases the risk of a DVT.  Home Care:    Wear compression stockings as directed by your doctor.    Move your ankles, toes and knees frequently to stimulate blood flow.    You will be prescribed a blood-thinning (anti-coagulant) medicine, either pills or shots. Take it exactly as directed.  Follow Up  Follow up with your doctor as advised. You will need regular blood tests to check your INR (International Normalized Ratio).  Get Prompt Medical Attention  Call your doctor or 911 if any of the following occur:    Shortness of breath or painful breathing    Chest pain, repeated cough or coughing up blood    Increasing swelling or increasing pain in the leg    Spreading redness    Unexpected bleeding (nose, gums, cuts, urinary tract, vagina, rectum)    5588-6485 [employer] [address] [place] [address]. All rights reserved. This information is not intended as a substitute for professional medical care. Always follow your healthcare professional's instructions.  This information has been modified by your health care provider with permission from the publisher.                Discharge Instructions for Deep Vein Thrombosis (DVT)  You have  been diagnosed with deep vein thrombosis (DVT). You have a blood clot in a deep vein. Hospital and home treatment for DVT include medications to keep the clot from growing. Here are guidelines for home care and lifestyle changes to reduce your risk of future blood clots.  Home Care    Take your medications exactly as directed. Don t skip doses. Your medications will thin your blood and help prevent new clots.    Keep your appointments for lab tests. It is important for your doctor to monitor your INR (International Normalized Ratio) on a regular basis. This is a simple blood test.    Avoid sitting, standing, or lying down for long periods without moving your legs and feet.    When traveling by car, make frequent stops to get out and move around.    On long airplane, train, or bus rides, get up and move around when possible.    If you can t get up, wiggle your toes and tighten your calves to keep your blood moving.    Wear compression stockings as directed by your doctor.    Elevate your legs whenever they feel swollen or heavy.  Lifestyle Changes    Begin an exercise program. Ask your doctor how to get started. You can benefit from simple activities such as walking or gardening.    Maintain a healthy weight.     Break the smoking habit. Enroll in a stop-smoking program to improve your chances of success.  Follow-Up  Make a follow-up appointment with a hematologist (doctor who specializes in the blood). He or she will decide how long you should stay on blood thinners.  When to Call Your Doctor  Call your doctor immediately if you have any of the following:    Swelling or pain in your leg (often in just one leg)    Sudden, continuous pain deep in a muscle    Pain that worsens when you are active or when you stand still for a long time    Chest pain    Sudden shortness of breath    Cough with blood or bloody sputum    Bruises    Heavy or uncontrolled bleeding    Blood in your urine, stool, or vomit    Black or tarry  stools     3996-4040 [employer]. [address] [place] [address], [place] [address]. All rights reserved. This information is not intended as a substitute for professional medical care. Always follow your healthcare professional's instructions.  This information has been modified by your health care provider with permission from the publisher.                Enoxaparin injection  Brand Name: Lovenox  What is this medicine?  ENOXAPARIN (ee nox a PA rin) is used after knee, hip, or abdominal surgeries to prevent blood clotting. It is also used to treat existing blood clots in the lungs or in the veins.  How should I use this medicine?  This medicine is for injection under the skin. It is usually given by a health-care professional. You or a family member may be trained on how to give the injections. If you are to give yourself injections, make sure you understand how to use the syringe, measure the dose if necessary, and give the injection. To avoid bruising, do not rub the site where this medicine has been injected. Do not take your medicine more often than directed. Do not stop taking except on the advice of your doctor or health care professional.  Make sure you receive a puncture-resistant container to dispose of the needles and syringes once you have finished with them. Do not reuse these items. Return the container to your doctor or health care professional for proper disposal.  Talk to your pediatrician regarding the use of this medicine in children. Special care may be needed.  What side effects may I notice from receiving this medicine?  Side effects that you should report to your doctor or health care professional as soon as possible:    allergic reactions like skin rash, itching or hives, swelling of the face, lips, or tongue    feeling faint or lightheaded, falls    signs and symptoms of bleeding such as bloody or black, tarry stools; red or dark-brown urine; spitting up blood or brown material that  looks like coffee grounds; red spots on the skin; unusual bruising or bleeding from the eye, gums, or nose  Side effects that usually do not require medical attention (report to your doctor or health care professional if they continue or are bothersome):    pain, redness, or irritation at site where injected  What may interact with this medicine?    aspirin and aspirin-like medicines    certain medicines that treat or prevent blood clots    dipyridamole    NSAIDs, medicines for pain and inflammation, like ibuprofen or naproxen  What if I miss a dose?  If you miss a dose, take it as soon as you can. If it is almost time for your next dose, take only that dose. Do not take double or extra doses.  Where should I keep my medicine?  Keep out of the reach of children.  Store at room temperature between 15 and 30 degrees C (59 and 86 degrees F). Do not freeze. If your injections have been specially prepared, you may need to store them in the refrigerator. Ask your pharmacist. Throw away any unused medicine after the expiration date.  What should I tell my health care provider before I take this medicine?  They need to know if you have any of these conditions:    bleeding disorders, hemorrhage, or hemophilia    infection of the heart or heart valves    kidney or liver disease    previous stroke    prosthetic heart valve    recent surgery or delivery of a baby    ulcer in the stomach or intestine, diverticulitis, or other bowel disease    an unusual or allergic reaction to enoxaparin, heparin, pork or pork products, other medicines, foods, dyes, or preservatives    pregnant or trying to get pregnant    breast-feeding  What should I watch for while using this medicine?  Visit your doctor or health care professional for regular checks on your progress. Your condition will be monitored carefully while you are receiving this medicine.  Notify your doctor or health care professional and seek emergency treatment if you develop  breathing problems; changes in vision; chest pain; severe, sudden headache; pain, swelling, warmth in the leg; trouble speaking; sudden numbness or weakness of the face, arm, or leg. These can be signs that your condition has gotten worse.  If you are going to have surgery, tell your doctor or health care professional that you are taking this medicine.  Do not stop taking this medicine without first talking to your doctor. Be sure to refill your prescription before you run out of medicine.  Avoid sports and activities that might cause injury while you are using this medicine. Severe falls or injuries can cause unseen bleeding. Be careful when using sharp tools or knives. Consider using an electric razor. Take special care brushing or flossing your teeth. Report any injuries, bruising, or red spots on the skin to your doctor or health care professional.  NOTE:This sheet is a summary. It may not cover all possible information. If you have questions about this medicine, talk to your doctor, pharmacist, or health care provider. Copyright  2017 [employer]

## 2018-01-11 ENCOUNTER — APPOINTMENT (OUTPATIENT)
Dept: OCCUPATIONAL THERAPY | Facility: HOSPITAL | Age: 83
DRG: 175 | End: 2018-01-11
Attending: INTERNAL MEDICINE
Payer: MEDICARE

## 2018-01-11 ENCOUNTER — APPOINTMENT (OUTPATIENT)
Dept: PHYSICAL THERAPY | Facility: HOSPITAL | Age: 83
DRG: 175 | End: 2018-01-11
Attending: INTERNAL MEDICINE
Payer: MEDICARE

## 2018-01-11 ENCOUNTER — APPOINTMENT (OUTPATIENT)
Dept: ULTRASOUND IMAGING | Facility: HOSPITAL | Age: 83
DRG: 175 | End: 2018-01-11
Attending: INTERNAL MEDICINE
Payer: MEDICARE

## 2018-01-11 ENCOUNTER — HOSPITAL ENCOUNTER (INPATIENT)
Dept: CARDIOLOGY | Facility: HOSPITAL | Age: 83
DRG: 175 | End: 2018-01-11
Attending: INTERNAL MEDICINE
Payer: MEDICARE

## 2018-01-11 PROBLEM — I10 ESSENTIAL HYPERTENSION, BENIGN: Status: ACTIVE | Noted: 2017-09-11

## 2018-01-11 PROBLEM — R09.02 HYPOXIA: Status: ACTIVE | Noted: 2018-01-11

## 2018-01-11 PROBLEM — I26.99 PULMONARY EMBOLISM, BILATERAL (H): Status: ACTIVE | Noted: 2018-01-11

## 2018-01-11 PROBLEM — I26.99 PULMONARY EMBOLISM (H): Status: ACTIVE | Noted: 2018-01-11

## 2018-01-11 PROBLEM — R79.89 ELEVATED TROPONIN I LEVEL: Status: ACTIVE | Noted: 2018-01-11

## 2018-01-11 LAB
ALBUMIN SERPL-MCNC: 3 G/DL (ref 3.4–5)
ALP SERPL-CCNC: 54 U/L (ref 40–150)
ALT SERPL W P-5'-P-CCNC: 33 U/L (ref 0–70)
ANION GAP SERPL CALCULATED.3IONS-SCNC: 9 MMOL/L (ref 3–14)
AST SERPL W P-5'-P-CCNC: 30 U/L (ref 0–45)
BILIRUB SERPL-MCNC: 1 MG/DL (ref 0.2–1.3)
BUN SERPL-MCNC: 19 MG/DL (ref 7–30)
CALCIUM SERPL-MCNC: 7.6 MG/DL (ref 8.5–10.1)
CHLORIDE SERPL-SCNC: 112 MMOL/L (ref 94–109)
CO2 SERPL-SCNC: 25 MMOL/L (ref 20–32)
CREAT SERPL-MCNC: 0.76 MG/DL (ref 0.66–1.25)
GFR SERPL CREATININE-BSD FRML MDRD: >90 ML/MIN/1.7M2
GLUCOSE SERPL-MCNC: 93 MG/DL (ref 70–99)
HCT VFR BLD AUTO: 37 % (ref 40–53)
HGB BLD-MCNC: 12.1 G/DL (ref 13.3–17.7)
INR PPP: 1.06 (ref 0.8–1.2)
POTASSIUM SERPL-SCNC: 3.7 MMOL/L (ref 3.4–5.3)
PROT SERPL-MCNC: 6.1 G/DL (ref 6.8–8.8)
PSA SERPL-ACNC: 0.27 UG/L (ref 0–4)
SODIUM SERPL-SCNC: 146 MMOL/L (ref 133–144)
TROPONIN I SERPL-MCNC: 0.46 UG/L (ref 0–0.04)
TROPONIN I SERPL-MCNC: 0.76 UG/L (ref 0–0.04)

## 2018-01-11 PROCEDURE — 80053 COMPREHEN METABOLIC PANEL: CPT | Performed by: INTERNAL MEDICINE

## 2018-01-11 PROCEDURE — 25000128 H RX IP 250 OP 636: Performed by: INTERNAL MEDICINE

## 2018-01-11 PROCEDURE — 85610 PROTHROMBIN TIME: CPT | Performed by: INTERNAL MEDICINE

## 2018-01-11 PROCEDURE — 25000132 ZZH RX MED GY IP 250 OP 250 PS 637: Mod: GY | Performed by: INTERNAL MEDICINE

## 2018-01-11 PROCEDURE — 97165 OT EVAL LOW COMPLEX 30 MIN: CPT | Mod: GO

## 2018-01-11 PROCEDURE — 40000786 ZZHCL STATISTIC ACTIVE MRSA SURVEILLANCE CULTURE: Performed by: INTERNAL MEDICINE

## 2018-01-11 PROCEDURE — 84484 ASSAY OF TROPONIN QUANT: CPT | Performed by: INTERNAL MEDICINE

## 2018-01-11 PROCEDURE — 97161 PT EVAL LOW COMPLEX 20 MIN: CPT | Mod: GP

## 2018-01-11 PROCEDURE — A9270 NON-COVERED ITEM OR SERVICE: HCPCS | Mod: GY | Performed by: INTERNAL MEDICINE

## 2018-01-11 PROCEDURE — 12000000 ZZH R&B MED SURG/OB

## 2018-01-11 PROCEDURE — 36415 COLL VENOUS BLD VENIPUNCTURE: CPT | Performed by: INTERNAL MEDICINE

## 2018-01-11 PROCEDURE — 93306 TTE W/DOPPLER COMPLETE: CPT | Mod: 26 | Performed by: INTERNAL MEDICINE

## 2018-01-11 PROCEDURE — G0103 PSA SCREENING: HCPCS | Performed by: INTERNAL MEDICINE

## 2018-01-11 PROCEDURE — 93970 EXTREMITY STUDY: CPT | Mod: TC

## 2018-01-11 PROCEDURE — 99223 1ST HOSP IP/OBS HIGH 75: CPT | Mod: AI | Performed by: INTERNAL MEDICINE

## 2018-01-11 PROCEDURE — 93306 TTE W/DOPPLER COMPLETE: CPT | Mod: TC

## 2018-01-11 PROCEDURE — 40000193 ZZH STATISTIC PT WARD VISIT

## 2018-01-11 PROCEDURE — 40000133 ZZH STATISTIC OT WARD VISIT

## 2018-01-11 PROCEDURE — 85014 HEMATOCRIT: CPT | Performed by: INTERNAL MEDICINE

## 2018-01-11 PROCEDURE — 85018 HEMOGLOBIN: CPT | Performed by: INTERNAL MEDICINE

## 2018-01-11 RX ORDER — WARFARIN SODIUM 5 MG/1
5 TABLET ORAL
Status: COMPLETED | OUTPATIENT
Start: 2018-01-11 | End: 2018-01-11

## 2018-01-11 RX ORDER — DOXAZOSIN 2 MG/1
8 TABLET ORAL DAILY
Status: DISCONTINUED | OUTPATIENT
Start: 2018-01-11 | End: 2018-01-13 | Stop reason: HOSPADM

## 2018-01-11 RX ORDER — ACETAMINOPHEN 325 MG/1
650 TABLET ORAL EVERY 4 HOURS PRN
Status: DISCONTINUED | OUTPATIENT
Start: 2018-01-11 | End: 2018-01-13 | Stop reason: HOSPADM

## 2018-01-11 RX ORDER — ONDANSETRON 2 MG/ML
4 INJECTION INTRAMUSCULAR; INTRAVENOUS EVERY 6 HOURS PRN
Status: DISCONTINUED | OUTPATIENT
Start: 2018-01-11 | End: 2018-01-13 | Stop reason: HOSPADM

## 2018-01-11 RX ORDER — PROCHLORPERAZINE MALEATE 5 MG
5 TABLET ORAL EVERY 6 HOURS PRN
Status: DISCONTINUED | OUTPATIENT
Start: 2018-01-11 | End: 2018-01-13 | Stop reason: HOSPADM

## 2018-01-11 RX ORDER — SODIUM CHLORIDE 9 MG/ML
INJECTION, SOLUTION INTRAVENOUS CONTINUOUS
Status: DISCONTINUED | OUTPATIENT
Start: 2018-01-11 | End: 2018-01-11

## 2018-01-11 RX ORDER — PROCHLORPERAZINE 25 MG
12.5 SUPPOSITORY, RECTAL RECTAL EVERY 12 HOURS PRN
Status: DISCONTINUED | OUTPATIENT
Start: 2018-01-11 | End: 2018-01-13 | Stop reason: HOSPADM

## 2018-01-11 RX ORDER — ASPIRIN 81 MG/1
81 TABLET, CHEWABLE ORAL DAILY
Status: DISCONTINUED | OUTPATIENT
Start: 2018-01-11 | End: 2018-01-13 | Stop reason: HOSPADM

## 2018-01-11 RX ORDER — ACETAMINOPHEN 650 MG/1
650 SUPPOSITORY RECTAL EVERY 4 HOURS PRN
Status: DISCONTINUED | OUTPATIENT
Start: 2018-01-11 | End: 2018-01-13 | Stop reason: HOSPADM

## 2018-01-11 RX ORDER — NALOXONE HYDROCHLORIDE 0.4 MG/ML
.1-.4 INJECTION, SOLUTION INTRAMUSCULAR; INTRAVENOUS; SUBCUTANEOUS
Status: DISCONTINUED | OUTPATIENT
Start: 2018-01-11 | End: 2018-01-13 | Stop reason: HOSPADM

## 2018-01-11 RX ORDER — ONDANSETRON 4 MG/1
4 TABLET, ORALLY DISINTEGRATING ORAL EVERY 6 HOURS PRN
Status: DISCONTINUED | OUTPATIENT
Start: 2018-01-11 | End: 2018-01-13 | Stop reason: HOSPADM

## 2018-01-11 RX ADMIN — SODIUM CHLORIDE: 9 INJECTION, SOLUTION INTRAVENOUS at 02:29

## 2018-01-11 RX ADMIN — ENOXAPARIN SODIUM 70 MG: 80 INJECTION SUBCUTANEOUS at 22:33

## 2018-01-11 RX ADMIN — ASPIRIN 81 MG 81 MG: 81 TABLET ORAL at 08:44

## 2018-01-11 RX ADMIN — WARFARIN SODIUM 5 MG: 5 TABLET ORAL at 19:00

## 2018-01-11 RX ADMIN — DOXAZOSIN MESYLATE 8 MG: 4 TABLET ORAL at 19:00

## 2018-01-11 RX ADMIN — ENOXAPARIN SODIUM 70 MG: 80 INJECTION SUBCUTANEOUS at 11:37

## 2018-01-11 ASSESSMENT — ACTIVITIES OF DAILY LIVING (ADL): PREVIOUS_RESPONSIBILITIES: YARDWORK;MEDICATION MANAGEMENT

## 2018-01-11 NOTE — PLAN OF CARE
Problem: Patient Care Overview  Goal: Plan of Care/Patient Progress Review  Outcome: No Change  Discharge Planner OT   Patient plan for discharge: home  Current status: independent  Barriers to return to prior living situation: currently on O2  Recommendations for discharge: home  Rationale for recommendations: pt is independent at prior level of function and has no needs that require OT at this time.       Entered by: Juliana Farfan 01/11/2018 11:00 AM

## 2018-01-11 NOTE — PROVIDER NOTIFICATION
DATE:  1/11/2018   TIME OF RECEIPT FROM LAB:  0653  LAB TEST:  Troponin  LAB VALUE:  0.757  RESULTS GIVEN WITH READ-BACK TO (PROVIDER):  Mike Kauffman MD  TIME LAB VALUE REPORTED TO PROVIDER:   0702

## 2018-01-11 NOTE — PLAN OF CARE
Problem: Patient Care Overview  Goal: Plan of Care/Patient Progress Review  Outcome: Completed Date Met: 01/11/18  Discharge Planner PT   Patient plan for discharge: Home  Current status: Independent and safe c all mobility. Pt very active at home and is at baseline   Barriers to return to prior living situation: None  Recommendations for discharge: Home  Rationale for recommendations: Pt safe and independent c all functional mobility. No skilled PT services necessary at this time. Pt encouraged to ambulate on unit.       Entered by: Megan Prado 01/11/2018 10:30 AM

## 2018-01-11 NOTE — PLAN OF CARE
Problem: Patient Care Overview  Goal: Plan of Care/Patient Progress Review  Outcome: Therapy, progress toward functional goals is gradual  A&Ox3. Calm and cooperative. Denying pain. IV infusing. Tele SR 60s Per ICU nurse. SBA. Incontinent (dribbles) of urine. Skin is dry. 2L via NC sating at 95%. Continuous pulse Ox. Repositions self in bed. Alarms audible. Slept part of the night. Call light in reach. No falls this shift. Will continue to monitor.     01/11/18 0825   OTHER   Plan Of Care Reviewed With patient   Plan of Care Review   Progress progress toward functional goals is gradual       Problem: VTE, DVT and PE (Adult)  Goal: Signs and Symptoms of Listed Potential Problems Will be Absent, Minimized or Managed (VTE, DVT and PE)  Signs and symptoms of listed potential problems will be absent, minimized or managed by discharge/transition of care (reference VTE, DVT and PE (Adult) CPG).   Outcome: Therapy, progress toward functional goals is gradual   01/11/18 0825   VTE, DVT and PE   Problems Assessed (VTE, DVT, PE) all   Problems Present (VTE, DVT, PE) pulmonary embolism       Face to face report given with opportunity to observe patient.    Report given to Jewell Catherine   1/11/2018  7:35 AM

## 2018-01-11 NOTE — PROGRESS NOTES
01/11/18 1000   Quick Adds   Type of Visit Initial PT Evaluation   Living Environment   Lives With spouse   Living Arrangements house   Home Accessibility stairs to enter home;stairs within home   Number of Stairs to Enter Home 4   Number of Stairs Within Home 12   Self-Care   Usual Activity Tolerance excellent   Current Activity Tolerance excellent   Regular Exercise yes   Activity/Exercise Type biking   Exercise Amount/Frequency (up to 6 miles, stationary bike)   Equipment Currently Used at Home none   Functional Level Prior   Ambulation 0-->independent   Transferring 0-->independent   Toileting 0-->independent   Bathing 0-->independent   Dressing 0-->independent   Fall history within last six months no   Which of the above functional risks had a recent onset or change? none   Prior Functional Level Comment Independent c all mobility at home. Pt very active. He and his wife do not get any assitance at home   General Information   Onset of Illness/Injury or Date of Surgery - Date 01/10/18   Referring Physician Dr. Kauffman   Patient/Family Goals Statement Return home   Pertinent History of Current Problem (include personal factors and/or comorbidities that impact the POC) Pt admitted c PE   Precautions/Limitations oxygen therapy device and L/min  (2L)   Weight-Bearing Status - LLE full weight-bearing   Weight-Bearing Status - RLE full weight-bearing   Cognitive Status Examination   Orientation orientation to person, place and time   Level of Consciousness alert   Follows Commands and Answers Questions 100% of the time   Personal Safety and Judgment intact   Memory intact   Pain Assessment   Patient Currently in Pain No   Integumentary/Edema   Integumentary/Edema no deficits were identifed   Posture    Posture Not impaired   Range of Motion (ROM)   ROM Quick Adds No deficits were identified   Strength   Manual Muscle Testing Quick Adds No deficits were identified   Bed Mobility   Bed Mobility Comments Independent  "  Transfer Skills   Transfer Comments Independent   Gait   Gait Gait Skill   Gait Skills   Level of [place]: Gait independent   Weight-Bearing Restrictions: Gait full weight-bearing   Assistive Device for Transfer: Gait (none)   Gait Distance (300ft, SpO2 95%)   Balance   Balance no deficits were identified   Sensory Examination   Sensory Perception no deficits were identified   Coordination   Coordination no deficits were identified   Muscle Tone   Muscle Tone no deficits were identified   Clinical Impression   Criteria for Skilled Therapeutic Intervention evaluation only;current level of function same as previous level of function;no problems identified which require skilled intervention   PT Diagnosis Difficulty c walking   Influenced by the following impairments None, current level same as previous   Functional limitations due to impairments none   Clinical Presentation Stable/Uncomplicated   Clinical Presentation Rationale Currently stable   Clinical Decision Making (Complexity) Low complexity   Predicted Duration of Therapy Intervention (days/wks) today   Anticipated Discharge Disposition Home   Risk & Benefits of therapy have been explained Yes   Patient, Family & other staff in agreement with plan of care Yes   Clinical Impression Comments Pt safe and independent c all functional mobility. No skilled PT services necessary at this time. Pt encouraged to ambulate on unit.   [facility] INDOM TM \"6 Clicks\"   2016, Trustees of [facility], under license to [employer].  All rights reserved.   6 Clicks Short Forms Basic Mobility Inpatient Short Form   [facility] [employer]  \"6 Clicks\" V.2 Basic Mobility Inpatient Short Form   1. Turning from your back to your side while in a flat bed without using bedrails? 4 - None   2. Moving from lying on your back to sitting on the side of a flat bed without using bedrails? 4 - None   3. Moving to and from a bed to a chair (including a wheelchair)? 4 - " None   4. Standing up from a chair using your arms (e.g., wheelchair, or bedside chair)? 4 - None   5. To walk in hospital room? 4 - None   6. Climbing 3-5 steps with a railing? 4 - None   Basic Mobility Raw Score (Score out of 24.Lower scores equate to lower levels of function) 24     I certify the need for these services furnished under this plan of treatment and while under my care. (Physician co-signature of this document indicates review and certification of the therapy plan).

## 2018-01-11 NOTE — ED NOTES
Face to face report given with opportunity to observe patient.    Report given to DONTE Desai   1/10/2018  10:41 PM

## 2018-01-11 NOTE — PROVIDER NOTIFICATION
MD notified Pt is questioning if he should be receiving his CARDURA while he is hospitalized. MD will review his med rec and place orders if needed.

## 2018-01-11 NOTE — PLAN OF CARE
[facility] Inpatient Admission Note:    Patient admitted to 3222/3222-1 at approximately 0131 via cart accompanied by transport tech from emergency room . Report received from Terri KENT in SBAR format at 0112 via telephone. Patient ambulated to bed via self.. Patient is alert and oriented X 3, denies pain; rates at 0 on 0-10 scale.  Patient oriented to room, unit, hourly rounding, and plan of care. Explained admission packet and patient handbook with patient bill of rights brochure. Will continue to monitor and document as needed.     Inpatient Nursing criteria listed below was met:      Health care directives status obtained and documented: No      Care Everywhere authorization obtained No      MRSA swab completed for patient 65 years and older: Yes      Patient identifies a surrogate decision maker: Yes If yes, who:Wife Contact Information:[phone]      Core Measure diagnosis present:Yes. If yes, state diagnosis: PE       Is initial lactic acid >2.0? NA.       Vaccination assessment and education completed: Yes   Vaccinations received prior to admission: Pneumovax no  Influenza(seasonal)  NO   Vaccination(s) ordered: influenza vaccine, pneumococcal vaccine      Clergy visit ordered if patient requests: No      Skin issues/needs documented: No      Isolation Patient: no Education given, correct sign in place and documentation row added to PCS:  No      Fall Prevention Yes: Care plan updated, education given and documented, sticker and magnet in place: Yes      Care Plan initiated: Yes      Education Documented (including assessment): Yes      Patient has discharge needs : pending If yes, please explain:pending

## 2018-01-11 NOTE — ED PROVIDER NOTES
"  History     Chief Complaint   Patient presents with     Shortness of Breath     72% on RA when EMS arrived. Albuterol given with sats up to 97%. Denies chest pain. Patient states \"i feel way better.\"     HPI  Mary Irizarry is a 85 year old male who presents to the ED via ambulance for shortness of breath. He'd been feeling \"off\" for the last few days then today he was more active around the house. At one point, he became pale and very short of breath and his wife called EMS. When EMS arrived, his oxygen sat was 72% on room air but improved to 97% with albuterol. In addition to his dyspnea, he's had a productive cough, abdominal gas, hard stools, and peripheral edema. He has not had any fevers, chest pain, or URI symptoms. He has no known history of lung disease and does not smoke.     Problem List:    Patient Active Problem List    Diagnosis Date Noted     Pulmonary embolism (H) 01/11/2018     Priority: Medium     Hypoxia 01/11/2018     Priority: Medium     Elevated troponin I level 01/11/2018     Priority: Medium     Pulmonary embolism, bilateral (H) 01/11/2018     Priority: Medium     Lesion of left pinna 09/11/2017     Priority: Medium     Essential hypertension, benign 09/11/2017     Priority: Medium     ACP (advance care planning) 08/16/2016     Priority: Medium     Advance Care Planning 8/16/2016: ACP Review of Chart / Resources Provided:  Reviewed chart for advance care plan.  Mary Irizarry has no plan or code status on file. Discussed available resources and declined information.Carlie Alicea             Facial lesion 08/28/2015     Priority: Medium     Left inguinal hernia 08/28/2015     Priority: Medium        Past Medical History:    History reviewed. No pertinent past medical history.    Past Surgical History:    Past Surgical History:   Procedure Laterality Date     GI SURGERY      prostatectomy       Family History:    No family history on file.    Social History:  Marital Status:   " "[2]  Social History   Substance Use Topics     Smoking status: Former Smoker     Smokeless tobacco: Never Used      Comment: 2 mos while in high school.      Alcohol use 0.0 oz/week     0 Standard drinks or equivalent per week      Comment: occa        Medications:      No current outpatient prescriptions on file.    Review of Systems   Constitutional: Negative for activity change, chills, fatigue and fever.   HENT: Negative for congestion and sore throat.    Eyes: Negative.    Respiratory: Positive for cough and shortness of breath. Negative for wheezing.    Cardiovascular: Negative for chest pain and leg swelling.   Gastrointestinal: Positive for abdominal pain and constipation. Negative for abdominal distention and nausea.   Genitourinary: Positive for frequency. Negative for decreased urine volume, dysuria and urgency.        Increased frequency over the last few years, however, vague description.   Musculoskeletal: Negative for arthralgias and myalgias.   Skin: Positive for pallor. Negative for color change.   Neurological: Negative for dizziness and light-headedness.   Psychiatric/Behavioral: Negative.        Physical Exam   BP: 120/100  Pulse: 81  Heart Rate: 83  Temp: (!) 95.7  F (35.4  C)  Resp: 18  Height: 170.2 cm (5' 7\")  Weight: 73.9 kg (163 lb)  SpO2: (!) 81 %      Physical Exam   Constitutional: He is oriented to person, place, and time. He appears well-developed and well-nourished. No distress.   HENT:   Head: Normocephalic and atraumatic.   Eyes: Conjunctivae and EOM are normal.   Neck: Normal range of motion. Neck supple.   Cardiovascular: Normal rate, regular rhythm, normal heart sounds and intact distal pulses.  Exam reveals no gallop and no friction rub.    No murmur heard.  Pulmonary/Chest: Effort normal and breath sounds normal. No accessory muscle usage. No tachypnea. No respiratory distress. He has no decreased breath sounds. He has no wheezes. He has no rhonchi. He has no rales. "   Abdominal: Soft. Bowel sounds are normal. He exhibits no distension. There is no tenderness.   Musculoskeletal: He exhibits no edema.   Neurological: He is alert and oriented to person, place, and time.   Skin: Skin is warm and dry. He is not diaphoretic.   Psychiatric: He has a normal mood and affect.   Nursing note and vitals reviewed.      ED Course     ED Course     Procedures            Labs Ordered and Resulted from Time of ED Arrival Up to the Time of Departure from the ED   UA MACROSCOPIC WITH REFLEX TO MICRO AND CULTURE - Abnormal; Notable for the following:        Result Value    Protein Albumin Urine 30 (*)     Bacteria Urine None (*)     Mucous Urine Present (*)     All other components within normal limits   CBC WITH PLATELETS DIFFERENTIAL - Abnormal; Notable for the following:     Platelet Count 104 (*)     All other components within normal limits   COMPREHENSIVE METABOLIC PANEL - Abnormal; Notable for the following:     Glucose 130 (*)     Calcium 8.3 (*)     AST 48 (*)     All other components within normal limits   TROPONIN I - Abnormal; Notable for the following:     Troponin I ES 0.423 (*)     All other components within normal limits   D-DIMER ([facility],) - Abnormal; Notable for the following:     D-Dimer ng/mL 4443 (*)     All other components within normal limits   CRP INFLAMMATION   TROPONIN I   VITAL SIGNS   CARDIAC CONTINUOUS MONITORING   PULSE OXIMETRY NURSING   PERIPHERAL IV CATHETER   INFLUENZA A/B ANTIGEN       Assessments & Plan (with Medical Decision Making)   UA, CBC, CMP, and CRP unremarkable.   Troponin mildly elevated at 0.030. Will redraw at 2130.  EKG ordered at 1957.    Patient was signed over to Dr. Cornejo at 2000.         I have reviewed the nursing notes.    I have reviewed the findings, diagnosis, plan and need for follow up with the patient.    Current Discharge Medication List          Final diagnoses:   PE (pulmonary thromboembolism) (H)       1/10/2018   [facility]GENCY  DEPARTMENT    The note was prepared by Carie Moise, MS3, under direction of Dr. Sanchez.   Patient examined along with the medical student.  I concur with her assessment and note.       Marion Alexis MD  01/11/18 1459       Marion Alexis MD  01/11/18 1457

## 2018-01-11 NOTE — PROGRESS NOTES
01/11/18 1050   Quick Adds   Type of Visit Initial Occupational Therapy Evaluation   Living Environment   Lives With spouse   Living Arrangements house   Home Accessibility stairs to enter home;stairs within home   Number of Stairs to Enter Home 4   Number of Stairs Within Home 12   Stair Railings at Home inside, present at both sides   Living Environment Comment Pt lives in a 3 level home with bedroom on main floor and walk-in shower   Self-Care   Dominant Hand right   Usual Activity Tolerance excellent   Current Activity Tolerance good   Regular Exercise yes   Activity/Exercise Type biking   Exercise Amount/Frequency 5-7 times/wk   Equipment Currently Used at Home none   Functional Level Prior   Ambulation 0-->independent   Transferring 0-->independent   Toileting 0-->independent   Bathing 0-->independent   Dressing 0-->independent   Eating 0-->independent   Communication 0-->understands/communicates without difficulty   Swallowing 0-->swallows foods/liquids without difficulty   Cognition 0 - no cognition issues reported   Fall history within last six months no   Prior Functional Level Comment Pt was independent prior       Present no   General Information   Onset of Illness/Injury or Date of Surgery - Date 01/11/18   Referring Physician Mike Koo MD   Patient/Family Goals Statement get back home   Additional Occupational Profile Info/Pertinent History of Current Problem Pt presented with shortnes of breath. Pt has PMH of prostate cancer   Precautions/Limitations oxygen therapy device and L/min   Weight-Bearing Status - LUE full weight-bearing   Weight-Bearing Status - RUE full weight-bearing   Weight-Bearing Status - LLE full weight-bearing   Weight-Bearing Status - RLE full weight-bearing   Cognitive Status Examination   Orientation orientation to person, place and time   Level of Consciousness alert   Able to Follow Commands WNL/WFL   Personal Safety (Cognitive) WNL/WFL   Memory intact  "  Attention No deficits were identified   Organization/Problem Solving No deficits were identified   Executive Function No deficits were identified   Visual Perception   Visual Perception Wears glasses   Sensory Examination   Sensory Quick Adds No deficits were identified   Pain Assessment   Patient Currently in Pain No   Range of Motion (ROM)   ROM Comment B UE WFL   Strength   Strength Comments B UE grossly 3+/5   Muscle Tone Assessment   Muscle Tone Quick Adds No deficits were identified   Coordination   Upper Extremity Coordination No deficits were identified   Transfer Skill: Sit to Stand   Level of [place]: Sit/Stand independent   Transfer Skill: Toilet Transfer   Level of [place]: Toilet stand-by assist   Physical Assist/Nonphysical Assist: Toilet supervision   Upper Body Dressing   Level of [place]: Dress Upper Body independent   Lower Body Dressing   Level of [place]: Dress Lower Body independent   Toileting   Level of [place]: Toilet independent   Grooming   Level of [place]: Grooming independent   Instrumental Activities of Daily Living (IADL)   Previous Responsibilities yardwork;medication management   IADL Comments pt and his spouse are independent in their home   Clinical Impression   Criteria for Skilled Therapeutic Interventions Met no problems identified which require skilled intervention;evaluation only   Assessment of Occupational Performance 1-3 Performance Deficits   Identified Performance Deficits endurance   Clinical Decision Making (Complexity) Low complexity   Anticipated Discharge Disposition Home   Risks and Benefits of Treatment have been explained. Yes   Patient, Family & other staff in agreement with plan of care Yes   Clinical Impression Comments At this time, pt is independent and has no deficits that require OT.  Will complete orders.   [facility] AM-PAC TM \"6 Clicks\"   2016, Trustees of [facility], under license to [employer].  All " "rights reserved.   6 Clicks Short Forms Daily Activity Inpatient Short Form   [facility] AM-PAC  \"6 Clicks\" Daily Activity Inpatient Short Form   1. Putting on and taking off regular lower body clothing? 4 - None   2. Bathing (including washing, rinsing, drying)? 4 - None   3. Toileting, which includes using toilet, bedpan or urinal? 4 - None   4. Putting on and taking off regular upper body clothing? 4 - None   5. Taking care of personal grooming such as brushing teeth? 4 - None   6. Eating meals? 4 - None   Daily Activity Raw Score (Score out of 24.Lower scores equate to lower levels of function) 24   Total Evaluation Time   Total Evaluation Time (Minutes) 25     "

## 2018-01-11 NOTE — PHARMACY-ANTICOAGULATION SERVICE
Clinical Pharmacy - Warfarin Dosing Consult  Pharmacy has been consulted to manage this patient s warfarin therapy.  Indication: DVT/PE Treatment  Therapy Goal: INR 2-3  Warfarin Prior to Admission: No  Significant drug interactions: Aspirin 81 mg, Lovenox (70 mg BID)    INR   Date Value Ref Range Status   01/11/2018 1.06 0.80 - 1.20 Final     Recommend warfarin 5 mg today.  Pharmacy will monitor Oiva E Ylonen daily and order warfarin doses to achieve specified goal.      Please contact pharmacy as soon as possible if the warfarin needs to be held for a procedure or if the warfarin goals change.

## 2018-01-11 NOTE — ED NOTES
Patient presents via [facility] with complaint of shortness of breath. Patient reports the shortness of breath began suddenly today. Patient's wife called the EMS when the patient became pale while moving around this evening. When EMS arrived patient was sitting in a chair, sats noted to be in the 70's on RA. Duoneb given with sats up to 97%. On arrival to room, sats 84% on RA. Patient placed on 4L of O2 via NC with sats up to mid 90's. Patient denies any chest pain, denies a cardiac history. Denies fevers at home. Reports a cough. IV placed and call light within reach.

## 2018-01-11 NOTE — PLAN OF CARE
Face to face report given with opportunity to observe patient.    Report given to DONTE Finn   1/11/2018  3:21 PM

## 2018-01-11 NOTE — PROVIDER NOTIFICATION
DATE:  1/11/2018   TIME OF RECEIPT FROM LAB:  1444   LAB TEST:  Troponin  LAB VALUE:  0.463  RESULTS GIVEN WITH READ-BACK TO (PROVIDER):  Dr. Mckeon . Orders to stop checking troponin as it is trending down.  DONTE Corcoran notified at [phone]  TIME LAB VALUE REPORTED TO PROVIDER:   8834

## 2018-01-11 NOTE — PROGRESS NOTES
"[facility]    Hospitalist Progress Note      Assessment & Plan   Mary Irizarry is a 85 year old male who was admitted on 1/10/2018 for SOB and found to have bilateral PEs with \"moderate\" clot burden per CT. Started on anticoagulation, O2, PT/OT. Echo and US LEs ordered for him. Hemodynamically stable. Question of h/o prostrate CA though pt believes he had his prostrate removed for symptomatic BPH. I reviewed office notes going back to 2016 and didn't find documentation of prostrate CA. PSA ordered by admitting physician. Given clot burden should have lifelong anticoagulation.     Principal Problem:    Pulmonary embolism (H)    \" Acute Hypoxic Respiratory Failure due to Pulmonary Emboli\"    Moderate load per CT. Echo and LE US ordered. Started on Lovenox and Coumadin. No obvious etiology for clot formation at this time. PT/OT to slowly mobilize. O2 as needed. Comfortable at rest. Hemodynamics are stable.     Active Problems:    Essential hypertension, benign    On Cardura at home.       Hypoxia  Due to PE. O2 as needed.       Elevated troponin I level    Likely secondary to PE. Last was 0.7. Will follow until peaks.     Mild cognitive impairment  At baseline. Mostly short term memory loss. But can understand and follow his disease process and appreciates explanations.    Addendum: US showed DVTs in LEs but no criteria for IVC filter. Please see below:   Potential Indications for Inferior Vena Cava Filter Insertion  Indication* Societies that Support this Indication Societies that Oppose this Indication Comments   Acute VTE and inability to anticoagulate ACCP,7 AHA,8 SIR,9,10 ACR11 - -   Anticoagulation failure AHA, SIR, ACR - -   Hemodynamically unstable patients, as an adjunct to anticoagulation ACCP, SIR, AHA, ACR - The intent is to prevent further hemodynamic decompensation   Massive PE treated with thrombolysis or thrombectomy or during thrombo-endarterectomy ACCP, SIR, ACR AHA -   Prophylaxis in " high-risk populations SIR, ACR ACCP Examples of high-risk populations include multi-trauma and spinal cord injury   Mobile thrombus SIR, ACR - -   Iliocaval DVT SIR, ACR - -   * Indications are not phrased exactly the same in different societal guidelines. The indications as they appear in this table are a result of the authors' interpretation  ACCP - American College of Chest Physicians, AHA - American Heart Association, ACR - American College of Radiology Appropriateness Criteria, DVT - deep vein thrombosis, PE - pulmonary embolism, SIR - Society for Interventional Radiology, VTE - venous thromboembolism      DVT Prophylaxis: Enoxaparin (Lovenox) SQ  Code Status: Full Code    Disposition: Expected discharge in 2-3 days once stable, ambulatory, Echo reviewed and O2 requirements assessed and Lovenox teaching done.     Anwar Daly    Interval History   Feels very comfortable at rest. No dyspnea. Hasn't attempted ambulation this AM.     -Data reviewed today: I reviewed all new labs and imaging results over the last 24 hours.  Physical Exam   Temp: 98.4  F (36.9  C) Temp src: Tympanic BP: 141/61 Pulse: 81 Heart Rate: 71 Resp: 18 SpO2: 95 % O2 Device: Nasal cannula Oxygen Delivery: 2 LPM  Vitals:    01/10/18 2215 01/10/18 2216 01/11/18 0125   Weight: 73.9 kg (163 lb) 73.5 kg (162 lb) 70.7 kg (155 lb 13.8 oz)     Vital Signs with Ranges  Temp:  [95.7  F (35.4  C)-98.6  F (37  C)] 98.4  F (36.9  C)  Pulse:  [81] 81  Heart Rate:  [66-95] 71  Resp:  [18] 18  BP: (120-172)/() 141/61  SpO2:  [81 %-98 %] 95 %  I/O last 3 completed shifts:  In: 800 [P.O.:120; I.V.:680]  Out: 100 [Urine:100]    Peripheral IV 01/10/18 Left Upper forearm (Active)   Site Assessment WDL except;Bleeding 1/11/2018  8:32 AM   Line Status Infusing;Checked every 1-2 hour 1/11/2018  8:32 AM   Phlebitis Scale 0-->no symptoms 1/11/2018  8:32 AM   Infiltration Scale 0 1/11/2018  8:32 AM   Number of days:1     Constitutional: awake, alert,  cooperative, no apparent distress, and appears stated age  HEENT: NC/AT. MMM. No icterus  Hematologic / Lymphatic: no cervical lymphadenopathy, no supraclavicular lymphadenopathy and No LAD  Respiratory: diminished BS at bases, otherwise normal auscultation and percussion  Cardiovascular: Regular, systolic murmur, no gallop on RV auscultation area.   GI: protuberant abdomen, not tender, no VM. BS +  Genitounirinary: Bladder not palpable, CVA not tender  Skin: no petechia, a few actinic keratoses  Neurologic: Awake, alert, oriented to name, place and time. Grossly non-focal.  Neuropsychiatric: General: normal, calm and normal eye contact  Level of consciousness: alert / normal  Orientation: oriented to self, place, time and situation  Memory and insight: impaired: short term memory  Medications     - MEDICATION INSTRUCTIONS -         [START ON 1/12/2018] pneumococcal vaccine  0.5 mL Intramuscular Prior to discharge     [START ON 1/12/2018] influenza Vac Split High-Dose  0.5 mL Intramuscular Prior to discharge     aspirin chewable tablet 81 mg  81 mg Oral Daily     enoxaparin  1 mg/kg Subcutaneous Q12H       Data     Recent Labs  Lab 01/11/18  0544 01/10/18  2134 01/10/18  1830   WBC  --   --  6.9   HGB 12.1*  --  13.7   MCV  --   --  91   PLT  --   --  104*   *  --  143   POTASSIUM 3.7  --  3.6   CHLORIDE 112*  --  109   CO2 25  --  29   BUN 19  --  23   CR 0.76  --  0.93   ANIONGAP 9  --  5   RADHA 7.6*  --  8.3*   GLC 93  --  130*   ALBUMIN 3.0*  --  3.6   PROTTOTAL 6.1*  --  7.3   BILITOTAL 1.0  --  0.8   ALKPHOS 54  --  68   ALT 33  --  39   AST 30  --  48*   TROPI 0.757* 0.423* 0.030       Recent Results (from the past 24 hour(s))   XR Chest 2 Views    Narrative    PROCEDURE:  XR CHEST 2 VW    HISTORY:  dyspnea; .     COMPARISON:  None.    FINDINGS:   The cardiac silhouette is normal in size. The pulmonary vasculature is  normal.  The lungs are clear. Some calcific pleural plaquing.  Degenerative changes are  present in the thoracic spine.      Impression    IMPRESSION:  Calcific pleural plaquing.. No acute pulmonary  infiltrates.      CLAUDIA MONTEJO MD   CTA Angiogram Chest w/o & w Contrast    Narrative    PROCEDURE: CTA ANGIOGRAM CHEST CONTRAST 1/10/2018 11:28 PM    HISTORY: r/o PE;     COMPARISONS: None.    Meds/Dose Given: [id number]    TECHNIQUE: Axial postcontrast enhanced images with coronal and  sagittal reformatted images.    FINDINGS: The study is positive for pulmonary emboli. There is a  moderate amount of clot in bilateral lower lobe pulmonary arteries  with some clot in the left upper lobe as well. This positive result  was called to the ordering physician by the virtual radiology  radiologist.    No enlarged lymph nodes are seen in the mediastinum, darell or axilla.  No focal consolidation is seen. There is linear atelectasis or scar at  the lung bases. There are very small pleural effusions. No  pneumothorax is seen.    There is multilevel degenerative change in the spine. There is no  acute compression fracture. Atherosclerotic calcification is seen  within the aorta. There is no aneurysm. There are low attenuation  lesions in the dome of the liver consistent with cysts.         Impression    IMPRESSION:   1. Bilateral pulmonary emboli with moderate clot burden.  2. Linear scar or atelectasis at the lung bases.    Findings are concordant with the original virtual radiology result.    MAXINE RUCKER MD

## 2018-01-11 NOTE — PROGRESS NOTES
Assessment completed see flow sheet.    Mary is sitting in the chair, his wife Queta is present during the assessment. His PCP is Dr Uriel Kent, his dentist is Dr Lee and he goes to [facility] in [place], he goes to [facility] for eyecare. He does not have a POA or a healthcare directive, provided information. He uses [facility] at [place]. He is a , he is not established, provided information. .    Mary lives at home with his wife Queta and their dog Tashi. He states he feels safe and his home is well maintained. He performs his own self cares, manages his medication and appointment schedules. His wife does the shopping and food prep. Mary drives when the weather is conducive otherwise Queta drives. He still works at the farm, he is a retired .     He sleeps well and states his moods are steady. He quit smoking in high school and consumes 1/2 glass of red wine daily. His brenda is important to him, he does not want brenda support at this time. He states he has everyday stressors. His support system are his wife and 3 sons. 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     His plan is to discharge to home, no needs identified, Queta will provide transportation. CM will remain available.     LOC: alert    Others present: Patient and Family    Dx: Bilateral PE    Lives with: Lives With: spouse (and dog Tashi)    Living at: Living Arrangements: house    Equipment used:  None    Support System:  Wife Queta and 3 sons    Primary PCP: JABIER Kent    POA/Guardian: No     Health Care Directive: NO     Pharmacy: [facility]    :  No    Homecare/County Services:   No    Adequate Resources for needs (housing, utilities, food/med): YES    Meds and appointments management: YES    Work: YES, on the farm    Transportation: YES     ADLs: independent    Falls: No    Able to Return to Prior Living Arrangements: YES    [place]: YES    Goals: to leave here  healthy    Barriers: none    Needs: Demonstrates Competency    HEATHER: none    Discharge Plan: home

## 2018-01-11 NOTE — ED PROVIDER NOTES
Sudden onset of SOB not be explained by CXR finding,  Elevated trop due to PE vs NSTEMI  1 dose lovenox given + ASA+ plavix  DDImer oreder that came more than 4000  CTA; B/l PE  Pt hemodymaically stable, denies any chest pain or discomfort, Spo2 93 on 3lit NC  Spoke to Dr Mckeon, accepted for admission

## 2018-01-11 NOTE — PROGRESS NOTES
[facility]    Spiritual Health Progress Note    Date of Service (when I saw the patient): 01/11/2018     Assessment & Plan   Mary Irizarry is a 85 year old male who was admitted on 1/10/2018. Initial contact to introduce to  services.  Brief discussion.  No spiritual needs at this time.    Rev. Nacho Calix  Volunteer

## 2018-01-11 NOTE — DISCHARGE INSTRUCTIONS
What to expect when you have contrast    During your exam, we will inject  contrast  into your vein or artery. (Contrast is a clear liquid with iodine in it. It shows up on X-rays.)    You may feel warm or hot. You may have a metal taste in your mouth and a slight upset stomach. You may also feel pressure near the kidneys and bladder. These effects will last about 1 to 3 minutes.    Please tell us if you have:    Sneezing     Itching    Hives     Swelling in the face    A hoarse voice    Breathing problems    Other new symptoms    Serious problems are rare.  They may include:    Irregular heartbeat     Seizures    Kidney failure              Tissue damage    Shock      Death    If you have any problems during the exam, we  will treat them right away.    When you get home    Call your hospital if you have any new symptoms in the next 2 days, like hives or swelling. (Phone numbers are at the bottom of this page.) Or call your family doctor.     If you have wheezing or trouble breathing, call 911.    Self-care  -Drink at least 4 extra glasses of water today.   This reduces the stress on your kidneys.  -Keep taking your regular medicines.    The contrast will pass out of your body in your  Urine(pee). This will happen in the next 24 hours. You  will not feel this. Your urine will not  change color.    If you have kidney problems or take metformin    Drink 4 to 8 large glasses of water for the next  2 days, if you are not on a fluid restriction.    ?If you take metformin (Glucophage or Glucovance) for diabetes, keep taking it.      ?Your kidney function tests are abnormal.  If you take Metformin, do not take it for 48 hours. Please go to your clinic for a blood test within 3 days after your exam before the restarting this medicine.     (Note to provider:please give patient prescription for lab tests.)    ?Special instructions: Drink an extra 4 glasses of water to flush out the contrast.     I have read and understand the  above information.    Patient Sign Here:______________________________________Date:________Time:______    Staff Sign Here:________________________________________Date:_______Time:______      Radiology Departments:     ?[facility]: [phone] ?[place]: [phone]     ?[place]: [phone] ?[facility]:[phone]      ?[place]: [phone]  ?[place]: [phone]  ?[place]:[phone]    ?[facility]:[phone]  ?[facility]:[phone]

## 2018-01-11 NOTE — PLAN OF CARE
"Problem: Patient Care Overview  Goal: Plan of Care/Patient Progress Review  Outcome: Improving  Reason for hospital stay:  PE's    Most recent vitals: /61  Pulse 81  Temp 98.4  F (36.9  C) (Tympanic)  Resp 18  Ht 1.702 m (5' 7\")  Wt 70.7 kg (155 lb 13.8 oz)  SpO2 99%  BMI 24.41 kg/m2  Pain Management:  Denies pain  Orientation:  A&O, forgetful  Cardiac:  HR wnl  Respiratory:  LS diminished. Denies sob. O2 weaned to RA maintaining 99%  GI:  BS active. Denies nause. BM this shift  :  Voids without difficulty into urinal. Urine ron and clear  Diet: Reg  Skin Issues:  Scattered bruises, skin tear to right forearm, covered with optifoam  IVF:  Saline locked  ABX:  None  Mobility:  Stand by assist, Ambulated in hallway this shift with good tolerance, moves at a quick pace  Safety:  Call light within reach. Bed alarm active    Comments: Echo and ultrasound of BLE preformed this shift    1/11/2018  2:36 PM  Jewell Christian        "

## 2018-01-11 NOTE — ED NOTES
patient pulled out one IV, he reported he didn't think he needed 2.  Area cleaned up, large hematoma. Bandage applied, updated acute care nursing staff.

## 2018-01-11 NOTE — H&P
"[facility]    History and Physical  Hospitalist       Date of Admission:  1/10/2018    Assessment & Plan   Mary Irizarry is a 85 year old male who presents with CC of shortness of breath    Principal Problem:    Pulmonary embolism (H)    Assessment: mod-large clot burden. ? Risk factors but age and remote h/o prostate cancer may play a role. Pt is relatively new to dr. Kent and minimal records available to review (mos of care was at [place]).     Plan: Lovenox 1 mg sq q 12 hr until stable, then oral anticoagulation. Would consider lovenox at least 24 hr.  Echo and venous duplex in AM.    Will check PSA in AM    Active Problems:    Essential hypertension, benign    Assessment: He takes only cardura 8 mg    Plan: continue cardura      Hypoxia    Assessment: due to PE    Plan: O2 per NC.       Elevated troponin I level    Assessment: most likely due to \"leak\" from RV    Plan: will recheck in AM    Mild cognitive impairment.   supportive care.     DVT Prophylaxis: pt already on therapeutic regimen due to PE.   Code Status: Full Code    Disposition: Expected discharge in a few days when medically stable.     Flaco Mckeon    Primary Care Physician   JABIER Kent    Chief Complaint   Shortness of breath    History is obtained from the patient, electronic health record and emergency department physician    History of Present Illness   Mary Irizarry is a 85 year old with PMH of prostate cancer, DVT, HTN who presents with acute onset of dyspnea. He'd been feeling \"off\" for a few days but today he was more active around the house (he admits that winter time he spends more time inside, and he also noticed that he is not active as he used to because of his joints).  On the day of admission, when going down and up in his house, he became very SOB and pale. Wife called EMs.   When EMS arrived, his pulse of was 72% on RA and improved to 97% with nebs. When he arrived to ED, his pulse ox was 84%. He denies chest " pain, hemoptysis, LE pain, edema but admits cough. . He  admits that he gained some weight because he is less active. His appetite did not change. He also did not notice any changes how frequently he urinates and urinary difficulties.   His wife noticed that recently he started showing some memory problems.  He was started on lovenox 1 mg/kg in ED. He ws also given ASA and Plavix because ACS was also in differential dgn.   He pulled out IV and large hematoma developed. This was while he was still in Ed. He is stable and will admit him to medical floor.    The rest is -ve. All 14 ROS obtained.     Past Medical History    I have reviewed this patient's medical history and updated it with pertinent information if needed.   History reviewed. No pertinent past medical history.    Past Surgical History   I have reviewed this patient's surgical history and updated it with pertinent information if needed.  Past Surgical History:   Procedure Laterality Date     GI SURGERY      prostatectomy       Prior to Admission Medications   Prior to Admission Medications   Prescriptions Last Dose Informant Patient Reported? Taking?   doxazosin (CARDURA) 8 MG tablet 1/10/2018 at Unknown time  No Yes   Sig: Take 1 tablet (8 mg) by mouth daily      Facility-Administered Medications: None     Allergies   No Known Allergies    Social History   I have reviewed this patient's social history and updated it with pertinent information if needed. Mary Irizarry  reports that he has quit smoking. He has never used smokeless tobacco. He reports that he drinks alcohol. He reports that he does not use illicit drugs.    Family History   I have reviewed this patient's family history and updated it with pertinent information if needed.   No family history on file.    Review of Systems   As per HPI. The rest is -ve. All 14 ROS obtained.     Physical Exam   Temp: 98.6  F (37  C) Temp src: Oral BP: (!) 128/105 Pulse: 81 Heart Rate: 81 Resp: 18 SpO2: 95 % O2  Device: Nasal cannula Oxygen Delivery: 2.5 LPM  Vital Signs with Ranges  Temp:  [95.7  F (35.4  C)-98.6  F (37  C)] 98.6  F (37  C)  Pulse:  [81] 81  Heart Rate:  [66-83] 81  Resp:  [18] 18  BP: (120-172)/() 128/105  SpO2:  [81 %-97 %] 95 %  162 lbs 0 oz    Constitutional: awake, alert, cooperative, no apparent distress, and appears stated age  HEENT: NC/AT. MMM. No icterus  Hematologic / Lymphatic: no cervical lymphadenopathy, no supraclavicular lymphadenopathy and No LAD  Respiratory: diminished BS at bases, otherwise normal auscultation and percussion  Cardiovascular: Regular, systolic murmur, no gallop on RV auscultation area.   GI: protuberant abdomen, not tender, linea alba disestesis.   Genitounirinary: Bladder not palpable, CVA not tender  Skin: no petechia, a few actinic keratoses  Musculoskeletal: no synovitis, mild OA changes, no sarcopenia. Trace edema  Neurologic: Awake, alert, oriented to name, place and time.  Cranial nerves II-XII are grossly intact.  Motor is 5 out of 5 bilaterally.  Cerebellar finger to nose, heel to shin intact.  Sensory is intact.  Babinski down going, Romberg negative, and gait is normal.  Neuropsychiatric: General: normal, calm and normal eye contact  Level of consciousness: alert / normal  Orientation: oriented to self, place, time and situation  Memory and insight: impaired: short term memory    Data   Data reviewed today:  I personally reviewed the EKG tracing showing no ST elevation and the chest CT image(s) showing multiple bilateral PE predominantly lower lobes.    Recent Labs  Lab 01/10/18  2134 01/10/18  1830   WBC  --  6.9   HGB  --  13.7   MCV  --  91   PLT  --  104*   NA  --  143   POTASSIUM  --  3.6   CHLORIDE  --  109   CO2  --  29   BUN  --  23   CR  --  0.93   ANIONGAP  --  5   RADHA  --  8.3*   GLC  --  130*   ALBUMIN  --  3.6   PROTTOTAL  --  7.3   BILITOTAL  --  0.8   ALKPHOS  --  68   ALT  --  39   AST  --  48*   TROPI 0.423* 0.030       Recent Results  (from the past 24 hour(s))   XR Chest 2 Views    Narrative    PROCEDURE:  XR CHEST 2 VW    HISTORY:  dyspnea; .     COMPARISON:  None.    FINDINGS:   The cardiac silhouette is normal in size. The pulmonary vasculature is  normal.  The lungs are clear. Some calcific pleural plaquing.  Degenerative changes are present in the thoracic spine.      Impression    IMPRESSION:  Calcific pleural plaquing.. No acute pulmonary  infiltrates.      CLAUDIA MONTEJO MD

## 2018-01-11 NOTE — ED NOTES
DATE:  1/10/2018   TIME OF RECEIPT FROM LAB:  2200  LAB TEST:  Troponin  LAB VALUE:  0.423  RESULTS GIVEN WITH READ-BACK TO (PROVIDER):  Dr. Mtz  TIME LAB VALUE REPORTED TO PROVIDER:   2200

## 2018-01-12 PROBLEM — J96.01 ACUTE RESPIRATORY FAILURE WITH HYPOXIA (H): Status: ACTIVE | Noted: 2018-01-12

## 2018-01-12 LAB
ANION GAP SERPL CALCULATED.3IONS-SCNC: 8 MMOL/L (ref 3–14)
BACTERIA SPEC CULT: NORMAL
BUN SERPL-MCNC: 19 MG/DL (ref 7–30)
CALCIUM SERPL-MCNC: 7.8 MG/DL (ref 8.5–10.1)
CHLORIDE SERPL-SCNC: 112 MMOL/L (ref 94–109)
CO2 SERPL-SCNC: 26 MMOL/L (ref 20–32)
CREAT SERPL-MCNC: 0.74 MG/DL (ref 0.66–1.25)
GFR SERPL CREATININE-BSD FRML MDRD: >90 ML/MIN/1.7M2
GLUCOSE SERPL-MCNC: 86 MG/DL (ref 70–99)
INR PPP: 1.21 (ref 0.8–1.2)
POTASSIUM SERPL-SCNC: 3.5 MMOL/L (ref 3.4–5.3)
SODIUM SERPL-SCNC: 146 MMOL/L (ref 133–144)
SPECIMEN SOURCE: NORMAL
TROPONIN I SERPL-MCNC: 0.26 UG/L (ref 0–0.04)

## 2018-01-12 PROCEDURE — 25000128 H RX IP 250 OP 636: Performed by: INTERNAL MEDICINE

## 2018-01-12 PROCEDURE — 99232 SBSQ HOSP IP/OBS MODERATE 35: CPT | Performed by: NURSE PRACTITIONER

## 2018-01-12 PROCEDURE — 25000132 ZZH RX MED GY IP 250 OP 250 PS 637: Mod: GY | Performed by: INTERNAL MEDICINE

## 2018-01-12 PROCEDURE — 12000000 ZZH R&B MED SURG/OB

## 2018-01-12 PROCEDURE — A9270 NON-COVERED ITEM OR SERVICE: HCPCS | Mod: GY | Performed by: NURSE PRACTITIONER

## 2018-01-12 PROCEDURE — 36415 COLL VENOUS BLD VENIPUNCTURE: CPT | Performed by: INTERNAL MEDICINE

## 2018-01-12 PROCEDURE — 84484 ASSAY OF TROPONIN QUANT: CPT | Performed by: NURSE PRACTITIONER

## 2018-01-12 PROCEDURE — 85610 PROTHROMBIN TIME: CPT | Performed by: NURSE PRACTITIONER

## 2018-01-12 PROCEDURE — 25000125 ZZHC RX 250: Performed by: INTERNAL MEDICINE

## 2018-01-12 PROCEDURE — 36415 COLL VENOUS BLD VENIPUNCTURE: CPT | Performed by: NURSE PRACTITIONER

## 2018-01-12 PROCEDURE — 25000132 ZZH RX MED GY IP 250 OP 250 PS 637: Mod: GY | Performed by: NURSE PRACTITIONER

## 2018-01-12 PROCEDURE — 90732 PPSV23 VACC 2 YRS+ SUBQ/IM: CPT | Performed by: INTERNAL MEDICINE

## 2018-01-12 PROCEDURE — A9270 NON-COVERED ITEM OR SERVICE: HCPCS | Mod: GY | Performed by: INTERNAL MEDICINE

## 2018-01-12 PROCEDURE — 80048 BASIC METABOLIC PNL TOTAL CA: CPT | Performed by: INTERNAL MEDICINE

## 2018-01-12 PROCEDURE — 90662 IIV NO PRSV INCREASED AG IM: CPT | Performed by: INTERNAL MEDICINE

## 2018-01-12 RX ORDER — WARFARIN SODIUM 5 MG/1
5 TABLET ORAL
Status: COMPLETED | OUTPATIENT
Start: 2018-01-12 | End: 2018-01-12

## 2018-01-12 RX ORDER — HYDRALAZINE HYDROCHLORIDE 20 MG/ML
10 INJECTION INTRAMUSCULAR; INTRAVENOUS
Status: DISCONTINUED | OUTPATIENT
Start: 2018-01-12 | End: 2018-01-13 | Stop reason: HOSPADM

## 2018-01-12 RX ORDER — AMLODIPINE BESYLATE 2.5 MG/1
2.5 TABLET ORAL DAILY
Status: DISCONTINUED | OUTPATIENT
Start: 2018-01-12 | End: 2018-01-13

## 2018-01-12 RX ORDER — ENALAPRILAT 1.25 MG/ML
1.25 INJECTION INTRAVENOUS EVERY 6 HOURS PRN
Status: DISCONTINUED | OUTPATIENT
Start: 2018-01-12 | End: 2018-01-13 | Stop reason: HOSPADM

## 2018-01-12 RX ORDER — AMLODIPINE BESYLATE 2.5 MG/1
2.5 TABLET ORAL AT BEDTIME
Status: DISCONTINUED | OUTPATIENT
Start: 2018-01-12 | End: 2018-01-12

## 2018-01-12 RX ORDER — AMLODIPINE BESYLATE 2.5 MG/1
2.5 TABLET ORAL DAILY
Status: DISCONTINUED | OUTPATIENT
Start: 2018-01-12 | End: 2018-01-12

## 2018-01-12 RX ORDER — ENALAPRILAT 1.25 MG/ML
INJECTION INTRAVENOUS
Status: DISPENSED
Start: 2018-01-12 | End: 2018-01-12

## 2018-01-12 RX ADMIN — WARFARIN SODIUM 5 MG: 5 TABLET ORAL at 18:42

## 2018-01-12 RX ADMIN — ENALAPRILAT 1.25 MG: 1.25 INJECTION INTRAVENOUS at 21:03

## 2018-01-12 RX ADMIN — DOXAZOSIN MESYLATE 8 MG: 4 TABLET ORAL at 08:17

## 2018-01-12 RX ADMIN — INFLUENZA A VIRUSA/MICHIGAN/45/2015 X-275 (H1N1) ANTIGEN (FORMALDEHYDE INACTIVATED), INFLUENZA A VIRUS A/HONG KONG/4801/2014 X-263B (H3N2) ANTIGEN (FORMALDEHYDE INACTIVATED), AND INFLUENZA B VIRUS B/BRISBANE/60/2008 ANTIGEN (FORMALDEHYDE INACTIVATED) 0.5 ML: 60; 60; 60 INJECTION, SUSPENSION INTRAMUSCULAR at 13:20

## 2018-01-12 RX ADMIN — ENOXAPARIN SODIUM 70 MG: 80 INJECTION SUBCUTANEOUS at 14:13

## 2018-01-12 RX ADMIN — HYDRALAZINE HYDROCHLORIDE 10 MG: 20 INJECTION INTRAMUSCULAR; INTRAVENOUS at 05:46

## 2018-01-12 RX ADMIN — ASPIRIN 81 MG 81 MG: 81 TABLET ORAL at 08:17

## 2018-01-12 RX ADMIN — PNEUMOCOCCAL VACCINE POLYVALENT 0.5 ML
25; 25; 25; 25; 25; 25; 25; 25; 25; 25; 25; 25; 25; 25; 25; 25; 25; 25; 25; 25; 25; 25; 25 INJECTION, SOLUTION INTRAMUSCULAR; SUBCUTANEOUS at 13:14

## 2018-01-12 RX ADMIN — ENALAPRILAT 1.25 MG: 1.25 INJECTION INTRAVENOUS at 04:26

## 2018-01-12 RX ADMIN — AMLODIPINE BESYLATE 2.5 MG: 2.5 TABLET ORAL at 18:42

## 2018-01-12 NOTE — PLAN OF CARE
Problem: Patient Care Overview  Goal: Plan of Care/Patient Progress Review  Outcome: No Change  Alert but occasionally confused to situation. Seems to worsen with the absence of wife. Initial blood pressure hypertensive and Dr. Mckeon restarted pt on cardura. Lungs diminished. Pt ambulating with stand by assist, requires frequent reminders of bleeding precautions and the use of call alarm. Pt and spouse educated heavily on coumadin and lovenox and the risks associated, handouts provided. Spo2 remains above 92% on room air while awake, requiring 1lpm nasal cannula while sleeping. IV- saline locked. Call light within reach. Bed alarms on.

## 2018-01-12 NOTE — PLAN OF CARE
Problem: Patient Care Overview  Goal: Plan of Care/Patient Progress Review  Outcome: No Change  Patient is currently on room air with oxygen saturations of 93%, patient was able to stand up and take a few steps in his room, and was able to tolerate a few steps well, patient was hoping for discharge today but does need to go home on Lovenox for his pulmonary embolism, patient and his wife were instructed on Lovenox administration.  patient denies pain and shortness of breath, there is a skin tear to patients right forearm and there is a foam dressing in place. Patient is alert and oriented, has made his needs known, the influenza and pneumo vaccines were administered today, there does not appear to be reaction from the injection site. The oncoming RN was updated and she did state she would continue to educate on injection.

## 2018-01-12 NOTE — PROGRESS NOTES
[facility]    Hospitalist Progress Note    Date of Service (when I saw the patient): 01/12/2018    Assessment & Plan   Mary Irizarry is a 85 year old male who was admitted on 1/10/2018.       Pulmonary embolism, bilateral (H): Symptoms have improved significantly, hypoxia resolved, no tachycardia or ectopy on telemetry. He was started on Lovenox and Coumadin. Initially he did not feel he or his wife could do injections, however after further educations he wants to try. Continue education throughout his stay. Recheck INR in AM.      Acute respiratory failure with hypoxia (H): Hypoxia resolved, no longer requiring oxygen. 97% on room air this afternoon.      Essential hypertension, benign: Elevation noted during the night but improved dramatically after Cardura this morning. Will not aggressively treat blood pressure but need to monitor closely on blood thinners.       Elevated troponin I level: Recheck this morning down from 0.4 to 0.2, capillary leak due to PE.     DVT Prophylaxis: Warfarin  Code Status: Full Code    Disposition: Expected discharge in 1-2 days if able to complete Lovenox injections at home pending therapeutic INR.    Phoebe Michael, CNP    Interval History   Feels quite a bit improved, denies any chest pain or shortness of breath. Eager for discharge.     -Data reviewed today: I reviewed all new labs and imaging results over the last 24 hours.    Physical Exam   Temp: 99.1  F (37.3  C) Temp src: Tympanic BP: 166/80   Heart Rate: 60 Resp: 20 SpO2: 95 % O2 Device: None (Room air) Oxygen Delivery: 1 LPM  Vitals:    01/10/18 2215 01/10/18 2216 01/11/18 0125   Weight: 73.9 kg (163 lb) 73.5 kg (162 lb) 70.7 kg (155 lb 13.8 oz)     Vital Signs with Ranges  Temp:  [98.4  F (36.9  C)-99.6  F (37.6  C)] 99.1  F (37.3  C)  Heart Rate:  [60-78] 60  Resp:  [16-24] 20  BP: (131-226)/(61-96) 166/80  SpO2:  [86 %-99 %] 95 %  I/O last 3 completed shifts:  In: 120 [P.O.:120]  Out: 350  [Urine:350]    Peripheral IV 01/10/18 Left Upper forearm (Active)   Site Assessment WDL 1/12/2018  4:32 PM   Line Status Saline locked 1/12/2018  4:32 PM   Phlebitis Scale 0-->no symptoms 1/12/2018  4:32 PM   Infiltration Scale 0 1/12/2018  4:32 PM   Infiltration Site Treatment Method  None 1/12/2018  8:00 AM   Extravasation? No 1/12/2018  8:00 AM   Number of days:2     Line/device assessment(s) completed for medical necessity    Constitutional: Awake, no acute distress  Respiratory: Diminished left base, otherwise clear without wheezes, rhonchi, crackles.   Cardiovascular: HRR, no murmur or thrills. No peripheral edema.   GI: Soft, nontender, bowel sounds positive.   Skin/Integumen: No rashes, bruising. Open skin tear right arm covered with tegaderm.        Medications     Warfarin Therapy Reminder       - MEDICATION INSTRUCTIONS -         warfarin  5 mg Oral ONCE at 18:00     [START ON 1/13/2018] enoxaparin  1 mg/kg Subcutaneous Q12H     aspirin chewable tablet 81 mg  81 mg Oral Daily     doxazosin  8 mg Oral Daily       Data     Recent Labs  Lab 01/12/18  0936 01/12/18  0536 01/11/18  1419 01/11/18  1015 01/11/18  0544  01/10/18  1830   WBC  --   --   --   --   --   --  6.9   HGB  --   --   --   --  12.1*  --  13.7   MCV  --   --   --   --   --   --  91   PLT  --   --   --   --   --   --  104*   INR 1.21*  --   --  1.06  --   --   --    NA  --  146*  --   --  146*  --  143   POTASSIUM  --  3.5  --   --  3.7  --  3.6   CHLORIDE  --  112*  --   --  112*  --  109   CO2  --  26  --   --  25  --  29   BUN  --  19  --   --  19  --  23   CR  --  0.74  --   --  0.76  --  0.93   ANIONGAP  --  8  --   --  9  --  5   RADHA  --  7.8*  --   --  7.6*  --  8.3*   GLC  --  86  --   --  93  --  130*   ALBUMIN  --   --   --   --  3.0*  --  3.6   PROTTOTAL  --   --   --   --  6.1*  --  7.3   BILITOTAL  --   --   --   --  1.0  --  0.8   ALKPHOS  --   --   --   --  54  --  68   ALT  --   --   --   --  33  --  39   AST  --   --   --    --  30  --  48*   TROPI  --  0.258* 0.463*  --  0.757*  < > 0.030   < > = values in this interval not displayed.    No results found for this or any previous visit (from the past 24 hour(s)).

## 2018-01-12 NOTE — PLAN OF CARE
Lovenox was initially held pending possible medication change, however Lovenox is to be given at this time and the patient as well as the wife is to be instructed on administration per this writer, after the patient and spouse are instructed and feel confident, discharge may be pending tomorrow. Lovenox has been re timed.

## 2018-01-12 NOTE — PLAN OF CARE
Face to face report given with opportunity to observe patient.    Report given to DONTE Ramesh   1/11/2018  11:06 PM

## 2018-01-12 NOTE — PLAN OF CARE
Provider notified of blood pressure, hold off on Vasotec for now, and re check blood pressure in about an hour.

## 2018-01-12 NOTE — PLAN OF CARE
Patient and wife were given instructions on Lovenox injection, a physical demonstration as well as the Lovenox education box was given to patients wife,  Practice needles were given to patients wife and she does verbalize an understanding of Lovenox administration.

## 2018-01-12 NOTE — PROGRESS NOTES
Met with Mary and Queta. Mary does not have prescription drug insurance. He will be reestablishing care with the [facility], and establish care appointment has been made for 02/12/2018 @ . CM will remain available.

## 2018-01-12 NOTE — PLAN OF CARE
Face to face report given with opportunity to observe patient.    Report given to Huma Hill RN  1/12/2018  4:00 PM

## 2018-01-12 NOTE — PROVIDER NOTIFICATION
Patient having elevated BP's 200/100's manual check by two RN's, notified dr. Linda MD to write orders.

## 2018-01-12 NOTE — PLAN OF CARE
Problem: VTE, DVT and PE (Adult)  Goal: Signs and Symptoms of Listed Potential Problems Will be Absent, Minimized or Managed (VTE, DVT and PE)  Signs and symptoms of listed potential problems will be absent, minimized or managed by discharge/transition of care (reference VTE, DVT and PE (Adult) CPG).   Outcome: No Change   01/12/18 0626   VTE, DVT and PE   Problems Assessed (VTE, DVT, PE) pulmonary embolism;deep vein thrombosis     Patient pleasant with some confusion. Voiding on commode. Denies pain, denies SOB. Having elevated BP's MD aware, prn IV Vasotec, and IV hydralazine given with moderate effect. Lung sounds diminished, o2 sats high 90's on 1 LPM nasal cannula.

## 2018-01-12 NOTE — PROVIDER NOTIFICATION
Notified dr. Mckeon of patients blood pressure 190/80's one hour after prn Vasotec, received order for 10 mg IV hydralazine Q hr prn.

## 2018-01-12 NOTE — PLAN OF CARE
Face to face report given with opportunity to observe patient.    Report given to Keerthi Remy   1/12/2018  7:06 AM

## 2018-01-12 NOTE — PHARMACY
Patient's INR is 1.21, will give 5 mg warfarin today, per [place] protocol.    Nacho Ochoa [facility]

## 2018-01-13 VITALS
SYSTOLIC BLOOD PRESSURE: 145 MMHG | WEIGHT: 155.87 LBS | HEIGHT: 67 IN | BODY MASS INDEX: 24.46 KG/M2 | TEMPERATURE: 99.9 F | HEART RATE: 76 BPM | OXYGEN SATURATION: 93 % | RESPIRATION RATE: 18 BRPM | DIASTOLIC BLOOD PRESSURE: 71 MMHG

## 2018-01-13 LAB
ANION GAP SERPL CALCULATED.3IONS-SCNC: 8 MMOL/L (ref 3–14)
BASOPHILS # BLD AUTO: 0 10E9/L (ref 0–0.2)
BASOPHILS NFR BLD AUTO: 0.3 %
BUN SERPL-MCNC: 19 MG/DL (ref 7–30)
CALCIUM SERPL-MCNC: 7.7 MG/DL (ref 8.5–10.1)
CHLORIDE SERPL-SCNC: 112 MMOL/L (ref 94–109)
CO2 SERPL-SCNC: 25 MMOL/L (ref 20–32)
CREAT SERPL-MCNC: 0.73 MG/DL (ref 0.66–1.25)
DIFFERENTIAL METHOD BLD: ABNORMAL
EOSINOPHIL # BLD AUTO: 0.1 10E9/L (ref 0–0.7)
EOSINOPHIL NFR BLD AUTO: 0.8 %
ERYTHROCYTE [DISTWIDTH] IN BLOOD BY AUTOMATED COUNT: 13.3 % (ref 10–15)
GFR SERPL CREATININE-BSD FRML MDRD: >90 ML/MIN/1.7M2
GLUCOSE SERPL-MCNC: 88 MG/DL (ref 70–99)
HCT VFR BLD AUTO: 37.4 % (ref 40–53)
HGB BLD-MCNC: 12.9 G/DL (ref 13.3–17.7)
IMM GRANULOCYTES # BLD: 0 10E9/L (ref 0–0.4)
IMM GRANULOCYTES NFR BLD: 0.3 %
INR PPP: 1.29 (ref 0.8–1.2)
LYMPHOCYTES # BLD AUTO: 1.6 10E9/L (ref 0.8–5.3)
LYMPHOCYTES NFR BLD AUTO: 22.6 %
MCH RBC QN AUTO: 30.5 PG (ref 26.5–33)
MCHC RBC AUTO-ENTMCNC: 34.5 G/DL (ref 31.5–36.5)
MCV RBC AUTO: 88 FL (ref 78–100)
MONOCYTES # BLD AUTO: 0.7 10E9/L (ref 0–1.3)
MONOCYTES NFR BLD AUTO: 9.6 %
NEUTROPHILS # BLD AUTO: 4.8 10E9/L (ref 1.6–8.3)
NEUTROPHILS NFR BLD AUTO: 66.4 %
NRBC # BLD AUTO: 0 10*3/UL
NRBC BLD AUTO-RTO: 0 /100
PLATELET # BLD AUTO: 101 10E9/L (ref 150–450)
POTASSIUM SERPL-SCNC: 3.4 MMOL/L (ref 3.4–5.3)
RBC # BLD AUTO: 4.23 10E12/L (ref 4.4–5.9)
SODIUM SERPL-SCNC: 145 MMOL/L (ref 133–144)
WBC # BLD AUTO: 7.2 10E9/L (ref 4–11)

## 2018-01-13 PROCEDURE — A9270 NON-COVERED ITEM OR SERVICE: HCPCS | Mod: GY | Performed by: INTERNAL MEDICINE

## 2018-01-13 PROCEDURE — 99239 HOSP IP/OBS DSCHRG MGMT >30: CPT | Performed by: NURSE PRACTITIONER

## 2018-01-13 PROCEDURE — 85610 PROTHROMBIN TIME: CPT | Performed by: NURSE PRACTITIONER

## 2018-01-13 PROCEDURE — 25000132 ZZH RX MED GY IP 250 OP 250 PS 637: Mod: GY | Performed by: INTERNAL MEDICINE

## 2018-01-13 PROCEDURE — 85025 COMPLETE CBC W/AUTO DIFF WBC: CPT | Performed by: NURSE PRACTITIONER

## 2018-01-13 PROCEDURE — 80048 BASIC METABOLIC PNL TOTAL CA: CPT | Performed by: NURSE PRACTITIONER

## 2018-01-13 PROCEDURE — 25000128 H RX IP 250 OP 636: Performed by: NURSE PRACTITIONER

## 2018-01-13 PROCEDURE — 36415 COLL VENOUS BLD VENIPUNCTURE: CPT | Performed by: NURSE PRACTITIONER

## 2018-01-13 RX ORDER — WARFARIN SODIUM 5 MG/1
5 TABLET ORAL DAILY
Qty: 30 TABLET | Refills: 0 | Status: SHIPPED | OUTPATIENT
Start: 2018-01-13 | End: 2018-03-16

## 2018-01-13 RX ORDER — AMLODIPINE BESYLATE 5 MG/1
5 TABLET ORAL
Qty: 30 TABLET | Refills: 0 | Status: SHIPPED | OUTPATIENT
Start: 2018-01-13 | End: 2018-02-05

## 2018-01-13 RX ORDER — AMLODIPINE BESYLATE 5 MG/1
5 TABLET ORAL DAILY
Status: DISCONTINUED | OUTPATIENT
Start: 2018-01-13 | End: 2018-01-13 | Stop reason: HOSPADM

## 2018-01-13 RX ADMIN — DOXAZOSIN MESYLATE 8 MG: 4 TABLET ORAL at 09:11

## 2018-01-13 RX ADMIN — ENOXAPARIN SODIUM 70 MG: 80 INJECTION SUBCUTANEOUS at 08:35

## 2018-01-13 RX ADMIN — ASPIRIN 81 MG 81 MG: 81 TABLET ORAL at 08:36

## 2018-01-13 NOTE — PLAN OF CARE
Face to face report given with opportunity to observe patient.    Report given to DONTE Murray and DONTE Vázquez   1/13/2018  7:08 AM

## 2018-01-13 NOTE — PLAN OF CARE
Problem: Patient Care Overview  Goal: Plan of Care/Patient Progress Review  Outcome: No Change  Alert and oriented. VSS. Spo2 95% on room air. Lungs diminished with expiratory wheezes in RLL. Dyspneic upon standing, remains bedfast with commode privileges. Pt appetite improving. IV remains saline locked. Initial blood pressure 166/80, new orders placed for scheduled norvasc. BP still 174/76 after norvasc. PRN vasotec given with blood pressure re check of 142/60. Telemetry NSR in the 70s with 1st degree AV block. Call light within reach. Bed alarms on.

## 2018-01-13 NOTE — PROGRESS NOTES
Name: Mary Irizarry    MRN#: [id number]    Reason for Hospitalization: PE (pulmonary thromboembolism) (H) [I26.99]    HEATHER: average    Discharge Date: 1/13/2018    Patient / Family response to discharge plan: Mary and his wife understand and agree; Queta feels comfortable giving the lovenox at home now that she has done the injection herself    Follow-Up Appt: Future Appointments  Date Time Provider Department Center   1/16/2018 11:00 AM JABIER Kent MD [facility]       Other Providers (Care Coordinator, [facility] Services, PCA services etc): No    Discharge Disposition: home via Queta      Zully Richardson RN

## 2018-01-13 NOTE — DISCHARGE SUMMARY
"[facility]    Discharge Summary  Hospitalist    Date of Admission:  1/10/2018  Date of Discharge:  1/13/2018 11:50 AM  Discharging Provider: Phoebe Michael CNP  Date of Service (when I saw the patient): 01/13/18    Discharge Diagnoses   Principal Problem:    Pulmonary embolism, bilateral (H)  Active Problems:    Acute respiratory failure with hypoxia (H)    Essential hypertension, benign    Elevated troponin I level      History of Present Illness   Mary Irizarry is a 85 year old with PMH of prostate cancer, DVT, HTN who presents with acute onset of dyspnea. He'd been feeling \"off\" for a few days but today he was more active around the house (he admits that winter time he spends more time inside, and he also noticed that he is not active as he used to because of his joints).  On the day of admission, when going down and up in his house, he became very SOB and pale. Wife called EMs.   When EMS arrived, his pulse of was 72% on RA and improved to 97% with nebs. When he arrived to ED, his pulse ox was 84%. He denies chest pain, hemoptysis, LE pain, edema but admits cough. . He  admits that he gained some weight because he is less active. His appetite did not change. He also did not notice any changes how frequently he urinates and urinary difficulties.   His wife noticed that recently he started showing some memory problems.  He was started on lovenox 1 mg/kg in ED. He ws also given ASA and Plavix because ACS was also in differential dgn.   He pulled out IV and large hematoma developed. This was while he was still in Ed. He is stable and will admit him to medical floor.    The rest is -ve. All 14 ROS obtained.    Hospital Course   Mary Irizarry was admitted on 1/10/2018.  The following problems were addressed during his hospitalization:    Pulmonary embolism, bilateral (H): Symptoms have improved significantly from initial presentation, hypoxia resolved, no tachycardia or ectopy on telemetry. He was " started on Lovenox and Coumadin. Initially he did not feel he or his wife could do injections, however after further education his wife was able to give his injection this morning. INR still subtherapeutic at 1.29,will continue 5 mg daily and have he recheck on Monday at Coumadin clinic with INR. He is instructed to return with respiratory changes or bleeding incident. He is discharged on Lovenox bridge until therapeutic. He does not have any medications coverage to other anticoagulants are not an option at this time.      Bilateral DVT: See above. No selling, redness or other indications of DVT's.        Acute respiratory failure with hypoxia (H): Hypoxia resolved, no longer requiring oxygen. 97% on room air, no dyspnea, tachypnea and lungs are clear.        Essential hypertension, benign: Elevation noted during the night but improved dramatically after Cardura, but again began to drift up in the afternoon and ran elevated through the night. Started on Norvasc 2.5 last night which did help but increased to 5mg for better coverage.         Elevated troponin I level: Recheck 1/12 morning down from 0.4 to 0.2, most likely capillary leak due to PE. Absolutely no associated ischemic symptoms even with the known bilateral PE's.         Phoebe Michael, CNP      Code Status   Full Code       Primary Care Physician   JABIER Kent        Discharge Disposition   Discharged to home  Condition at discharge: Stable    Consultations This Hospital Stay   PHYSICAL THERAPY ADULT IP CONSULT  OCCUPATIONAL THERAPY ADULT IP CONSULT  PHARMACY IP CONSULT    Time Spent on this Encounter   Phoebe SALINAS, personally saw the patient today and spent greater than 30 minutes discharging this patient.    Discharge Orders     INR CLINIC REFERRAL     Reason for your hospital stay   Pulmonary embolism     Follow-up and recommended labs and tests    Follow up with primary care provider, JABIER Kent, within 7 days for hospital follow-  up.  No follow up labs or test are needed.  Follow-up with coumadin clinic on Monday Dec 15th for lab check (INR), the coumadin clinic should call you for appointment, if you do not hear from them by end of day Monday, please call in to clinic.     Activity   Your activity upon discharge: activity as tolerated     Discharge Instructions   No aerobic or strenuous activities until seen for follow-up. Rest with legs elevated, take frequent short leisurely walks about the house until then. Notify physician or present to the Emergency Department if you develop shortness of breath, leg swelling, excessive bleeding or bruising.     Full Code     Diet   Follow this diet upon discharge: Orders Placed This Encounter     Combination Diet Regular Diet Adult       Discharge Medications   Current Discharge Medication List      START taking these medications    Details   enoxaparin (LOVENOX) 80 MG/0.8ML injection Inject 0.7 mLs (70 mg) Subcutaneous every 12 hours for 5 days  Qty: 10 Syringe, Refills: 0    Associated Diagnoses: PE (pulmonary thromboembolism) (H)      amLODIPine (NORVASC) 5 MG tablet Take 1 tablet (5 mg) by mouth daily (with dinner)  Qty: 30 tablet, Refills: 0    Associated Diagnoses: Essential hypertension, benign      warfarin (COUMADIN) 5 MG tablet Take 1 tablet (5 mg) by mouth daily Take at 6 pm every day until otherwise directed by Coumadin Clinic  Qty: 30 tablet, Refills: 0    Associated Diagnoses: PE (pulmonary thromboembolism) (H)         CONTINUE these medications which have NOT CHANGED    Details   ASPIRIN PO Take 81 mg by mouth daily      doxazosin (CARDURA) 8 MG tablet Take 1 tablet (8 mg) by mouth daily  Qty: 90 tablet, Refills: 3    Associated Diagnoses: Essential hypertension, benign           Allergies   No Known Allergies  Data   Most Recent 3 CBC's:  Recent Labs   Lab Test  01/13/18   0556  01/11/18   0544  01/10/18   1830   WBC  7.2   --   6.9   HGB  12.9*  12.1*  13.7   MCV  88   --   91   PLT   101*   --   104*      Most Recent 3 BMP's:  Recent Labs   Lab Test  01/13/18   0556  01/12/18   0536  01/11/18   0544   NA  145*  146*  146*   POTASSIUM  3.4  3.5  3.7   CHLORIDE  112*  112*  112*   CO2  25  26  25   BUN  19  19  19   CR  0.73  0.74  0.76   ANIONGAP  8  8  9   RADHA  7.7*  7.8*  7.6*   GLC  88  86  93     Most Recent 2 LFT's:  Recent Labs   Lab Test  01/11/18   0544  01/10/18   1830   AST  30  48*   ALT  33  39   ALKPHOS  54  68   BILITOTAL  1.0  0.8     Most Recent INR's and Anticoagulation Dosing History:  Anticoagulation Dose History     Recent Dosing and Labs Latest Ref Rng & Units 1/11/2018 1/12/2018 1/13/2018    Warfarin 5 mg - 5 mg 5 mg -    INR 0.80 - 1.20 1.06 1.21(H) 1.29(H)        Most Recent 3 Troponin's:  Recent Labs   Lab Test  01/12/18   0536  01/11/18   1419  01/11/18   0544   TROPI  0.258*  0.463*  0.757*     Most Recent Cholesterol Panel:No lab results found.  Most Recent 6 Bacteria Isolates From Any Culture (See EPIC Reports for Culture Details):  Recent Labs   Lab Test  01/11/18   0215   CULT  No MRSA isolated     Most Recent TSH, T4 and A1c Labs:No lab results found.  Results for orders placed or performed during the hospital encounter of 01/10/18   XR Chest 2 Views    Narrative    PROCEDURE:  XR CHEST 2 VW    HISTORY:  dyspnea; .     COMPARISON:  None.    FINDINGS:   The cardiac silhouette is normal in size. The pulmonary vasculature is  normal.  The lungs are clear. Some calcific pleural plaquing.  Degenerative changes are present in the thoracic spine.      Impression    IMPRESSION:  Calcific pleural plaquing.. No acute pulmonary  infiltrates.      CLAUDIA MONTEJO MD   CTA Angiogram Chest w/o & w Contrast    Narrative    PROCEDURE: CTA ANGIOGRAM CHEST CONTRAST 1/10/2018 11:28 PM    HISTORY: r/o PE;     COMPARISONS: None.    Meds/Dose Given: [id number]    TECHNIQUE: Axial postcontrast enhanced images with coronal and  sagittal reformatted images.    FINDINGS: The study is  positive for pulmonary emboli. There is a  moderate amount of clot in bilateral lower lobe pulmonary arteries  with some clot in the left upper lobe as well. This positive result  was called to the ordering physician by the virtual radiology  radiologist.    No enlarged lymph nodes are seen in the mediastinum, darell or axilla.  No focal consolidation is seen. There is linear atelectasis or scar at  the lung bases. There are very small pleural effusions. No  pneumothorax is seen.    There is multilevel degenerative change in the spine. There is no  acute compression fracture. Atherosclerotic calcification is seen  within the aorta. There is no aneurysm. There are low attenuation  lesions in the dome of the liver consistent with cysts.         Impression    IMPRESSION:   1. Bilateral pulmonary emboli with moderate clot burden.  2. Linear scar or atelectasis at the lung bases.    Findings are concordant with the original virtual radiology result.    MAXINE RUCKER MD   US Lower Extremity Venous Duplex Bilateral    Narrative    PROCEDURE: US LOWER EXTREMITY VENOUS DUPLEX BILATERAL 1/11/2018 12:02  PM    HISTORY: Pulmonary embolism;     COMPARISONS: None.    TECHNIQUE: Deep venous system was evaluated bilaterally.    FINDINGS: The study is positive for deep venous thrombosis. This  positive result was given to the patient's nurse by the ultrasound  technologist.    On the right clot is seen within the common femoral vein, proximal  deep femoral vein and in the right external iliac vein.    On the left clot is seen within the superficial femoral vein,  popliteal vein and posterior tibial veins.         Impression    IMPRESSION: The study is positive for deep venous thrombosis.    MAXINE RUCKER MD

## 2018-01-13 NOTE — PLAN OF CARE
Problem: Patient Care Overview  Goal: Plan of Care/Patient Progress Review  Outcome: No Change  Alert and oriented. Some confusion and repetition at times. /88 with apresoline administered per PRN order with recheck of 133/48. Asymptomatic. Spo2 93-94% on room air. Requiring assist of 1 with transfers and ambulation. Slept poorly with c/o of cool mattress, anxiety playing large part in insomnia. IV- saline locked. T- 99.9. Call light within reach. Bed alarms on. Telemetry monitor sinus dysrhythmias in the 60s with 1st degree AV block and occasional PVCs and PACs per ICU report.

## 2018-01-13 NOTE — PLAN OF CARE
Patient discharged at 11:30 AM via wheel chair accompanied by spouse and staff. Prescriptions sent to patients preferred pharmacy. All belongings sent with patient.     Discharge instructions reviewed with pt et spouse. Listed belongings gathered and returned to patient. yes    Patient discharged to home.   Report called to n/a    Core Measures and Vaccines  Core Measures applicable during stay: Yes. If yes, state diagnosis: PE  Pneumonia and Influenza given prior to discharge, if indicated: N/A    Surgical Patient   Surgical Procedures during stay: n/a  Did patient receive discharge instruction on wound care and recognition of infection symptoms? N/A    MISC  Follow up appointment made:  Yes  Home and hospital aquired medications returned to patient: Yes and N/A  Patient reports pain was well managed at discharge: Yes

## 2018-01-13 NOTE — PLAN OF CARE
"Problem: Patient Care Overview  Goal: Plan of Care/Patient Progress Review  Outcome: Adequate for Discharge Date Met: 01/13/18  Reason for hospital stay:  PE        Most recent vitals: /71  Pulse 76  Temp 99.9  F (37.7  C) (Tympanic)  Resp 18  Ht 1.702 m (5' 7\")  Wt 70.7 kg (155 lb 13.8 oz)  SpO2 93%  BMI 24.41 kg/m2    Pain Management:  Denies    Orientation:  A/O    Cardiac:  WDL    Respiratory:  Dim in bases, SOB with exertion    GI:  Bowel sounds active x4    :  WDL    Skin Issues:  Bruises, scabs, scars    IVF:  Saline locked    ABX:  n/a    Mobility:  Independent in room    Safety:  Not real steady on feet, SBA 1        Comments: Lovenox injection teaching held with spouse et pt, spouse administered injection et verbalized understanding of dosing et administration        1/13/2018  11:03 AM  Terri Sal          "

## 2018-01-15 ENCOUNTER — TELEPHONE (OUTPATIENT)
Dept: FAMILY MEDICINE | Facility: OTHER | Age: 83
End: 2018-01-15

## 2018-01-15 ENCOUNTER — TELEPHONE (OUTPATIENT)
Dept: CASE MANAGEMENT | Facility: HOSPITAL | Age: 83
End: 2018-01-15

## 2018-01-15 NOTE — TELEPHONE ENCOUNTER
Please put patient on at 3:45 tomorrow for hospital follow up patient already notified of appointment   MATIAS JORDAN

## 2018-01-15 NOTE — PHARMACY-ANTICOAGULATION SERVICE
Clinical Pharmacy- Warfarin Discharge Note  This patient is currently on warfarin for the treatment of PE.  INR Goal= 2-3    Anticoagulation Dose History     Recent Dosing and Labs Latest Ref Rng & Units 1/11/2018 1/12/2018 1/13/2018    Warfarin 5 mg - 5 mg 5 mg -    INR 0.80 - 1.20 1.06 1.21(H) 1.29(H)        Vitamin K doses administered during the last 7 days: 0  FFP administered during the last 7 days: 0    Spoke with Marylou from [facility]. This is a new patient for her. Discussed the indication, goal, INRs, warfarin doses given, and Lovenox dosing upon discharge. Marylou will contact the patient for follow up monitoring.    Carito Nguyễn RPh

## 2018-01-15 NOTE — TELEPHONE ENCOUNTER
8:45 AM    Reason for Call: OVERBOOK    Patient is having the following symptoms: Hospital follow up/blood clot for 6 days. (pt was released from hospital Sat and was scheduled to see Dr LEÓN Kent for Tues but wife is unable to bring pt in at that time)    The patient is requesting an appointment for 1:00 pm or later tomorrow or this week with Dr LEÓN Kent.    Was an appointment offered for this call? Yes  If yes : Appointment type long              Date  01/23/18    Preferred method for responding to this message: Telephone Call  What is your phone number ?  [phone]    If we cannot reach you directly, may we leave a detailed response at the number you provided? Yes    Can this message wait until your PCP/provider returns, if unavailable today? Not applicable    Luz Aguiar

## 2018-01-16 ENCOUNTER — OFFICE VISIT (OUTPATIENT)
Dept: FAMILY MEDICINE | Facility: OTHER | Age: 83
End: 2018-01-16
Attending: FAMILY MEDICINE
Payer: COMMERCIAL

## 2018-01-16 VITALS
DIASTOLIC BLOOD PRESSURE: 80 MMHG | TEMPERATURE: 97.5 F | OXYGEN SATURATION: 96 % | BODY MASS INDEX: 26 KG/M2 | WEIGHT: 166 LBS | HEART RATE: 63 BPM | SYSTOLIC BLOOD PRESSURE: 130 MMHG

## 2018-01-16 DIAGNOSIS — Z86.711 HISTORY OF PULMONARY EMBOLISM: Primary | ICD-10-CM

## 2018-01-16 PROCEDURE — G0463 HOSPITAL OUTPT CLINIC VISIT: HCPCS

## 2018-01-16 PROCEDURE — 99213 OFFICE O/P EST LOW 20 MIN: CPT | Performed by: FAMILY MEDICINE

## 2018-01-16 ASSESSMENT — PATIENT HEALTH QUESTIONNAIRE - PHQ9: SUM OF ALL RESPONSES TO PHQ QUESTIONS 1-9: 7

## 2018-01-16 ASSESSMENT — PAIN SCALES - GENERAL: PAINLEVEL: NO PAIN (0)

## 2018-01-16 ASSESSMENT — ANXIETY QUESTIONNAIRES
3. WORRYING TOO MUCH ABOUT DIFFERENT THINGS: NOT AT ALL
1. FEELING NERVOUS, ANXIOUS, OR ON EDGE: NOT AT ALL
7. FEELING AFRAID AS IF SOMETHING AWFUL MIGHT HAPPEN: NOT AT ALL
IF YOU CHECKED OFF ANY PROBLEMS ON THIS QUESTIONNAIRE, HOW DIFFICULT HAVE THESE PROBLEMS MADE IT FOR YOU TO DO YOUR WORK, TAKE CARE OF THINGS AT HOME, OR GET ALONG WITH OTHER PEOPLE: SOMEWHAT DIFFICULT
4. TROUBLE RELAXING: NOT AT ALL
5. BEING SO RESTLESS THAT IT IS HARD TO SIT STILL: NOT AT ALL
2. NOT BEING ABLE TO STOP OR CONTROL WORRYING: NOT AT ALL
GAD7 TOTAL SCORE: 1
6. BECOMING EASILY ANNOYED OR IRRITABLE: SEVERAL DAYS

## 2018-01-16 NOTE — MR AVS SNAPSHOT
After Visit Summary   1/16/2018    Mary Irizarry    MRN: [id number]           Patient Information     Date Of Birth          8/27/1932        Visit Information        Provider Department      1/16/2018 3:45 PM JABIER Kent MD [facility]         Follow-ups after your visit        Follow-up notes from your care team     Return in about 2 months (around 3/16/2018).      Your next 10 appointments already scheduled     Jan 17, 2018  1:00 PM CST   Anticoagulation Visit with  ANTI COAGULATION   [facility] ([facility] [place] )    [address]  [place] [address]   [phone]            Mar 16, 2018  3:15 PM CDT   (Arrive by 3:00 PM)   Office Visit with JABIER Kent MD   [facility] ([facility] [place] )    [address]  [place] [address]   [phone]           Bring a current list of meds and any records pertaining to this visit.  For Physicals, please bring immunization records and any forms needing to be filled out.  Please arrive 15 minutes early to complete paperwork and register.              Who to contact     If you have questions or need follow up information about today's clinic visit or your schedule please contact [facility] directly at [phone].  Normal or non-critical lab and imaging results will be communicated to you by Ampriushart, letter or phone within 4 business days after the clinic has received the results. If you do not hear from us within 7 days, please contact the clinic through Ampriushart or phone. If you have a critical or abnormal lab result, we will notify you by phone as soon as possible.  Submit refill requests through Ceon or call your pharmacy and they will forward the refill request to us. Please allow 3 business days for your refill to be completed.          Additional Information About Your Visit        Ceon Information     Ceon lets you send messages to your doctor,  "view your test results, renew your prescriptions, schedule appointments and more. To sign up, go to [email or web address] . Click on \"Log in\" on the left side of the screen, which will take you to the Welcome page. Then click on \"Sign up Now\" on the right side of the page.     You will be asked to enter the access code listed below, as well as some personal information. Please follow the directions to create your username and password.     Your access code is: [email or web address]  Expires: 2018 10:25 AM     Your access code will  in 90 days. If you need help or a new code, please call your [place] clinic or [phone].        Care EveryWhere ID     This is your Care EveryWhere ID. This could be used by other organizations to access your [place] medical records  [id number]        Your Vitals Were     Pulse Temperature Pulse Oximetry BMI (Body Mass Index)          63 97.5  F (36.4  C) (Tympanic) 96% 26 kg/m2         Blood Pressure from Last 3 Encounters:   18 130/80   18 145/71   17 180/84    Weight from Last 3 Encounters:   18 166 lb (75.3 kg)   18 155 lb 13.8 oz (70.7 kg)   17 151 lb (68.5 kg)              Today, you had the following     No orders found for display       Primary Care Provider Office Phone # Fax #    R Uriel Kent [phone]       [facility] [place] [address] [place] [id number]        Equal Access to Services     [facility]: Hadii savannah rodríguez hadasho Soomaali, waaxda luqadaha, qaybta kaalmada adealexsandra, delia le. So [facility] [phone].    ATENCIÓN: Si habla español, tiene a quiroz disposición servicios gratuitos de asistencia lingüística. Llame al [phone].    We comply with applicable federal civil rights laws and [place] laws. We do not discriminate on the basis of race, color, national origin, age, disability, sex, sexual orientation, or gender identity.            Thank you!     " Thank you for choosing [facility]  for your care. Our goal is always to provide you with excellent care. Hearing back from our patients is one way we can continue to improve our services. Please take a few minutes to complete the written survey that you may receive in the mail after your visit with us. Thank you!             Your Updated Medication List - Protect others around you: Learn how to safely use, store and throw away your medicines at www.disposemymeds.org.          This list is accurate as of: 1/16/18  3:54 PM.  Always use your most recent med list.                   Brand Name Dispense Instructions for use Diagnosis    amLODIPine 5 MG tablet    NORVASC    30 tablet    Take 1 tablet (5 mg) by mouth daily (with dinner)    Essential hypertension, benign       ASPIRIN PO      Take 81 mg by mouth daily        doxazosin 8 MG tablet    CARDURA    90 tablet    Take 1 tablet (8 mg) by mouth daily    Essential hypertension, benign       enoxaparin 80 MG/0.8ML injection    LOVENOX    10 Syringe    Inject 0.7 mLs (70 mg) Subcutaneous every 12 hours for 5 days    PE (pulmonary thromboembolism) (H)       warfarin 5 MG tablet    COUMADIN    30 tablet    Take 1 tablet (5 mg) by mouth daily Take at 6 pm every day until otherwise directed by Coumadin Clinic    PE (pulmonary thromboembolism) (H)

## 2018-01-16 NOTE — NURSING NOTE
"Chief Complaint   Patient presents with     Hospital F/U       Initial /80 (BP Location: Left arm, Patient Position: Chair, Cuff Size: Adult Regular)  Pulse 63  Temp 97.5  F (36.4  C) (Tympanic)  Wt 166 lb (75.3 kg)  SpO2 96%  BMI 26 kg/m2 Estimated body mass index is 26 kg/(m^2) as calculated from the following:    Height as of 1/11/18: 5' 7\" (1.702 m).    Weight as of this encounter: 166 lb (75.3 kg).  Medication Reconciliation: complete     MATIAS JORDAN      "

## 2018-01-16 NOTE — PROGRESS NOTES
"  SUBJECTIVE:   Mary Irizarry is a 85 year old male who presents to clinic today for the following health issues:    He is here for follow-up hospitalization for pulmonary emboli     Discharge Summary  Hospitalist     Date of Admission:  1/10/2018  Date of Discharge:  1/13/2018 11:50 AM  Discharging Provider: Phoebe Michael CNP  Date of Service (when I saw the patient): 01/13/18        Discharge Diagnoses      Principal Problem:    Pulmonary embolism, bilateral (H)  Active Problems:    Acute respiratory failure with hypoxia (H)    Essential hypertension, benign    Elevated troponin I level           History of Present Illness      Mary Irizarry is a 85 year old with PMH of prostate cancer, DVT, HTN who presents with acute onset of dyspnea. He'd been feeling \"off\" for a few days but today he was more active around the house (he admits that winter time he spends more time inside, and he also noticed that he is not active as he used to because of his joints).  On the day of admission, when going down and up in his house, he became very SOB and pale. Wife called EMs.   When EMS arrived, his pulse of was 72% on RA and improved to 97% with nebs. When he arrived to ED, his pulse ox was 84%. He denies chest pain, hemoptysis, LE pain, edema but admits cough. . He  admits that he gained some weight because he is less active. His appetite did not change. He also did not notice any changes how frequently he urinates and urinary difficulties.   His wife noticed that recently he started showing some memory problems.  He was started on lovenox 1 mg/kg in ED. He ws also given ASA and Plavix because ACS was also in differential dgn.   He pulled out IV and large hematoma developed. This was while he was still in Ed. He is stable and will admit him to medical floor.    The rest is -ve. All 14 ROS obtained.        Hospital Course     Mary Irizarry was admitted on 1/10/2018.  The following problems were addressed during his " hospitalization:     Pulmonary embolism, bilateral (H): Symptoms have improved significantly from initial presentation, hypoxia resolved, no tachycardia or ectopy on telemetry. He was started on Lovenox and Coumadin. Initially he did not feel he or his wife could do injections, however after further education his wife was able to give his injection this morning. INR still subtherapeutic at 1.29,will continue 5 mg daily and have he recheck on Monday at Coumadin clinic with INR. He is instructed to return with respiratory changes or bleeding incident. He is discharged on Lovenox bridge until therapeutic. He does not have any medications coverage to other anticoagulants are not an option at this time.       Bilateral DVT: See above. No selling, redness or other indications of DVT's.         Acute respiratory failure with hypoxia (H): Hypoxia resolved, no longer requiring oxygen. 97% on room air, no dyspnea, tachypnea and lungs are clear.         Essential hypertension, benign: Elevation noted during the night but improved dramatically after Cardura, but again began to drift up in the afternoon and ran elevated through the night. Started on Norvasc 2.5 last night which did help but increased to 5mg for better coverage.          Elevated troponin I level: Recheck 1/12 morning down from 0.4 to 0.2, most likely capillary leak due to PE. Absolutely no associated ischemic symptoms even with the known bilateral PE's.            PAST MEDICAL HISTORY:  History reviewed. No pertinent past medical history.    PAST SURGICAL HISTORY:  Past Surgical History:   Procedure Laterality Date     GI SURGERY      prostatectomy       MEDICATIONS:  Prior to Admission medications    Medication Sig Start Date End Date Taking? Authorizing Provider   enoxaparin (LOVENOX) 80 MG/0.8ML injection Inject 0.7 mLs (70 mg) Subcutaneous every 12 hours for 5 days 1/13/18 1/18/18 Yes Phoebe Michael, NP   amLODIPine (NORVASC) 5 MG tablet Take 1 tablet  (5 mg) by mouth daily (with dinner) 1/13/18  Yes Phoebe Michael NP   warfarin (COUMADIN) 5 MG tablet Take 1 tablet (5 mg) by mouth daily Take at 6 pm every day until otherwise directed by Coumadin Clinic 1/13/18  Yes Phoebe Michael NP   ASPIRIN PO Take 81 mg by mouth daily   Yes Reported, Patient   doxazosin (CARDURA) 8 MG tablet Take 1 tablet (8 mg) by mouth daily 9/11/17  Yes JABIER Kent MD       ALLERGIES:   No Known Allergies    ROS:  Constitutional, HEENT, cardiovascular, pulmonary, gi and gu systems are negative, except as otherwise noted.        EXAM:/80 (BP Location: Left arm, Patient Position: Chair, Cuff Size: Adult Regular)  Pulse 63  Temp 97.5  F (36.4  C) (Tympanic)  Wt 166 lb (75.3 kg)  SpO2 96%  BMI 26 kg/m2 Body mass index is 26 kg/(m^2).   GENERAL APPEARANCE: healthy, alert and no distress  EYES: Eyes grossly normal to inspection, PERRL and conjunctivae and sclerae normal  RESP: lungs clear to auscultation - no rales, rhonchi or wheezes  CV: regular rates and rhythm, normal S1 S2, no S3 or S4 and no murmur, click or rub  NEURO: Normal strength and tone, mentation intact and speech normal  Lab/ X-ray  No results found for this or any previous visit (from the past 24 hour(s)).  BRANDAN-7 SCORE 1/16/2018   Total Score 1     PHQ-9 SCORE 1/16/2018   Total Score 7       ASSESSMENT/PLAN:    ICD-10-CM    1. History of pulmonary embolism Z86.711  he is feeling well and doing well.  His first Coumadin clinic visit will be tomorrow.  Is here with his wife.  Has no chest pain or shortness of breath.  He is wondering when he can get back into the sauna.  I told him to wait a couple weeks and then slowly advance activities as tolerated.  He will wear his support hose daily.  He has been wearing these for years.         HARRIETT Kent MD  January 16, 2018

## 2018-01-17 ENCOUNTER — ANTICOAGULATION THERAPY VISIT (OUTPATIENT)
Dept: ANTICOAGULATION | Facility: OTHER | Age: 83
End: 2018-01-17
Attending: FAMILY MEDICINE
Payer: MEDICARE

## 2018-01-17 DIAGNOSIS — I26.99 PULMONARY EMBOLISM (H): ICD-10-CM

## 2018-01-17 DIAGNOSIS — Z79.01 LONG-TERM (CURRENT) USE OF ANTICOAGULANTS: ICD-10-CM

## 2018-01-17 LAB — INR POINT OF CARE: 3.4 (ref 0.86–1.14)

## 2018-01-17 PROCEDURE — 85610 PROTHROMBIN TIME: CPT | Mod: QW,ZL

## 2018-01-17 ASSESSMENT — ANXIETY QUESTIONNAIRES: GAD7 TOTAL SCORE: 1

## 2018-01-17 NOTE — PROGRESS NOTES
"  ANTICOAGULATION INITIAL CLINIC VISIT    Patient Name:  Mary Irizarry  Date:  1/17/2018  Referred by: Dr. RUTH Kent  Contact Type:  Face to Face    SUBJECTIVE:  Coumadin education was completed today.  Topics covered include:  -Introduction to coumadin. We discussed color of pills, we discussed time to take warfarin  -Proper Administration  -INR Testing. We discussed importance of INR testing  -Sign/Symptoms of Bleeding. We talked about how to stop minor bleeding and when to go to ER  -Signs/Symptoms of Clot Formation or Stroke We discussed head bleed/stroke symptoms and symptoms of venous and arterial clots  -Dietary Intake of Vitamin K, given vit K food list and contents discussed  -Drug Interactions We discussed pills that interact with warfarin and also include OTC and herbal products.  -Anticoagulation Identification (bracelet, necklace or wallet card) wallet card given to patient  -Future Surgery We discussed what is meant by \"surgery\", endoscopy, colonoscopy, biopsy. Dental procedures.  -Effects of Alcohol, Tobacco, and Exercise on Coumadin    Coumadin Education Booklet and Coumadin Identification Wallet Card were given to the patient.       Patient Findings     Positives Change in medications    Comments Patient seen for initial INR visit. He was given warfarin clinic information booklet, food list and color of pill handout. INR is therapeutic so he is to discontinue lovenox shots at this time. He and wife verbalized understanding of all things discussed          OBJECTIVE    INR Protime   Date Value Ref Range Status   01/17/2018 3.4 (A) 0.86 - 1.14 Final       ASSESSMENT / PLAN  INR assessment SUPRA    Recheck INR In: 5 DAYS    INR Location Clinic      Anticoagulation Summary as of 1/17/2018     INR goal 2.0-3.0   Today's INR 3.4!   Maintenance plan No maintenance plan   Full instructions 1/17: 2.5 mg; 1/18: 2.5 mg; 1/19: 2.5 mg; 1/20: 5 mg; 1/21: 5 mg; Otherwise No maintenance plan   Next INR check " 1/22/2018   Target end date     Indications   Pulmonary embolism (H) [I26.99]  Long-term (current) use of anticoagulants [Z79.01] [Z79.01]         Anticoagulation Episode Summary     INR check location     Preferred lab     Send INR reminders to  ANTICOAG POOL    Comments       Anticoagulation Care Providers     Provider Role Specialty Phone number    JABIER Kent MD [facility] [phone]            See the Encounter Report to view Anticoagulation Flowsheet and Dosing Calendar (Go to Encounters tab in chart review, and find the Anticoagulation Therapy Visit)        Marylou Joshi RN

## 2018-01-17 NOTE — MR AVS SNAPSHOT
Mary Irizarry   1/17/2018 1:00 PM   Anticoagulation Therapy Visit    Description:  85 year old male   Provider:   ANTI COAGULATION   Department:  Hc Anti Coagulation           INR as of 1/17/2018     Today's INR 3.4!      Anticoagulation Summary as of 1/17/2018     INR goal 2.0-3.0   Today's INR 3.4!   Full instructions 1/17: 2.5 mg; 1/18: 2.5 mg; 1/19: 2.5 mg; 1/20: 5 mg; 1/21: 5 mg; Otherwise No maintenance plan   Next INR check 1/22/2018    Indications   Pulmonary embolism (H) [I26.99]  Long-term (current) use of anticoagulants [Z79.01] [Z79.01]         Your next Anticoagulation Clinic appointment(s)     Jan 22, 2018  1:30 PM CST   Anticoagulation Visit with  ANTI COAGULATION   [facility] [place] ([facility] [place] [facility]    [address] [address]   [phone]              January 2018 Details    Sun Mon Tue Wed Thu Fri Sat      1               2               3               4               5               6                 7               8               9               10               11               12               13                 14               15               16               17      2.5 mg   See details      18      2.5 mg         19      2.5 mg         20      5 mg           21      5 mg         22            23               24               25               26               27                 28               29               30               31                   Date Details   01/17 This INR check       Date of next INR:  1/22/2018         How to take your warfarin dose     To take:  2.5 mg Take 0.5 of a 5 mg tablet.    To take:  5 mg Take 1 of the 5 mg tablets.

## 2018-01-22 ENCOUNTER — ANTICOAGULATION THERAPY VISIT (OUTPATIENT)
Dept: ANTICOAGULATION | Facility: OTHER | Age: 83
End: 2018-01-22
Attending: FAMILY MEDICINE
Payer: MEDICARE

## 2018-01-22 DIAGNOSIS — Z79.01 LONG-TERM (CURRENT) USE OF ANTICOAGULANTS: ICD-10-CM

## 2018-01-22 LAB — INR POINT OF CARE: 4.1 (ref 0.86–1.14)

## 2018-01-22 PROCEDURE — 36416 COLLJ CAPILLARY BLOOD SPEC: CPT | Mod: ZL

## 2018-01-22 NOTE — PROGRESS NOTES
ANTICOAGULATION FOLLOW-UP CLINIC VISIT    Patient Name:  Mary Irizarry  Date:  1/22/2018  Contact Type:  Face to Face    SUBJECTIVE:     Patient Findings     Positives No Problem Findings           OBJECTIVE    INR Protime   Date Value Ref Range Status   01/22/2018 4.1 (A) 0.86 - 1.14 Final       ASSESSMENT / PLAN  INR assessment SUPRA    Recheck INR In: 1 WEEK    INR Location Clinic      Anticoagulation Summary as of 1/22/2018     INR goal 2.0-3.0   Today's INR 4.1!   Maintenance plan No maintenance plan   Full instructions 1/22: Hold; 1/23: Hold; 1/24: 2.5 mg; 1/25: 2.5 mg; 1/26: 2.5 mg; 1/27: 2.5 mg; 1/28: 2.5 mg; Otherwise No maintenance plan   Next INR check 1/29/2018   Target end date     Indications   Pulmonary embolism (H) [I26.99]  Long-term (current) use of anticoagulants [Z79.01] [Z79.01]         Anticoagulation Episode Summary     INR check location     Preferred lab     Send INR reminders to  ANTICOAG POOL    Comments       Anticoagulation Care Providers     Provider Role Specialty Phone number    JABIER [place] MD [facility] [phone]            See the Encounter Report to view Anticoagulation Flowsheet and Dosing Calendar (Go to Encounters tab in chart review, and find the Anticoagulation Therapy Visit)        Marylou Joshi RN

## 2018-01-22 NOTE — MR AVS SNAPSHOT
Mary BIANCA Harleyedgar   1/22/2018 1:30 PM   Anticoagulation Therapy Visit    Description:  85 year old male   Provider:  HC ANTI COAGULATION   Department:  Hc Anti Coagulation           INR as of 1/22/2018     Today's INR 4.1!      Anticoagulation Summary as of 1/22/2018     INR goal 2.0-3.0   Today's INR 4.1!   Full instructions 1/22: Hold; 1/23: Hold; 1/24: 2.5 mg; 1/25: 2.5 mg; 1/26: 2.5 mg; 1/27: 2.5 mg; 1/28: 2.5 mg; Otherwise No maintenance plan   Next INR check 1/29/2018    Indications   Pulmonary embolism (H) [I26.99]  Long-term (current) use of anticoagulants [Z79.01] [Z79.01]         Your next Anticoagulation Clinic appointment(s)     Jan 29, 2018  2:15 PM CST   Anticoagulation Visit with HC ANTI COAGULATION   [facility] [place] ([facility] )    [address] [facility] [address]   [phone]              January 2018 Details    Sun Mon Tue Wed Thu Fri Sat      1               2               3               4               5               6                 7               8               9               10               11               12               13                 14               15               16               17               18               19               20                 21               22      Hold   See details      23      Hold         24      2.5 mg         25      2.5 mg         26      2.5 mg         27      2.5 mg           28      2.5 mg         29            30               31                   Date Details   01/22 This INR check       Date of next INR:  1/29/2018         How to take your warfarin dose     To take:  2.5 mg Take 0.5 of a 5 mg tablet.    Hold Do not take your warfarin dose. See the Details table to the right for additional instructions.

## 2018-01-29 ENCOUNTER — ANTICOAGULATION THERAPY VISIT (OUTPATIENT)
Dept: ANTICOAGULATION | Facility: OTHER | Age: 83
End: 2018-01-29
Attending: FAMILY MEDICINE
Payer: MEDICARE

## 2018-01-29 DIAGNOSIS — Z79.01 LONG-TERM (CURRENT) USE OF ANTICOAGULANTS: ICD-10-CM

## 2018-01-29 LAB — INR POINT OF CARE: 2.5 (ref 0.86–1.14)

## 2018-01-29 PROCEDURE — 85610 PROTHROMBIN TIME: CPT | Mod: QW,ZL

## 2018-01-29 NOTE — PROGRESS NOTES
ANTICOAGULATION FOLLOW-UP CLINIC VISIT    Patient Name:  Mary Irizarry  Date:  1/29/2018  Contact Type:  Face to Face    SUBJECTIVE:     Patient Findings     Positives No Problem Findings           OBJECTIVE    INR Protime   Date Value Ref Range Status   01/29/2018 2.5 (A) 0.86 - 1.14 Final       ASSESSMENT / PLAN  INR assessment THER    Recheck INR In: 1 WEEK    INR Location Clinic      Anticoagulation Summary as of 1/29/2018     INR goal 2.0-3.0   Today's INR 2.5   Maintenance plan No maintenance plan   Full instructions 1/29: 2.5 mg; 1/30: 2.5 mg; 1/31: 2.5 mg; 2/1: 2.5 mg; 2/2: 2.5 mg; 2/3: 2.5 mg; 2/4: 2.5 mg; Otherwise No maintenance plan   Next INR check 2/5/2018   Target end date     Indications   Pulmonary embolism (H) [I26.99]  Long-term (current) use of anticoagulants [Z79.01] [Z79.01]         Anticoagulation Episode Summary     INR check location     Preferred lab     Send INR reminders to  ANTICOAG POOL    Comments       Anticoagulation Care Providers     Provider Role Specialty Phone number    JABIER Kent MD [facility] [phone]            See the Encounter Report to view Anticoagulation Flowsheet and Dosing Calendar (Go to Encounters tab in chart review, and find the Anticoagulation Therapy Visit)        Marylou Joshi RN

## 2018-01-29 NOTE — MR AVS SNAPSHOT
Mary Irizarry   1/29/2018 2:15 PM   Anticoagulation Therapy Visit    Description:  85 year old male   Provider:  HC ANTI COAGULATION   Department:  Hc Anti Coagulation           INR as of 1/29/2018     Today's INR 2.5      Anticoagulation Summary as of 1/29/2018     INR goal 2.0-3.0   Today's INR 2.5   Full instructions 1/29: 2.5 mg; 1/30: 2.5 mg; 1/31: 2.5 mg; 2/1: 2.5 mg; 2/2: 2.5 mg; 2/3: 2.5 mg; 2/4: 2.5 mg; Otherwise No maintenance plan   Next INR check 2/5/2018    Indications   Pulmonary embolism (H) [I26.99]  Long-term (current) use of anticoagulants [Z79.01] [Z79.01]         Your next Anticoagulation Clinic appointment(s)     Feb 05, 2018  2:30 PM CST   Anticoagulation Visit with HC ANTI COAGULATION   [facility] [place] ([facility] )    [address] [place]  [place] [address]   [phone]              January 2018 Details    Sun Mon Tue Wed Thu Fri Sat      1               2               3               4               5               6                 7               8               9               10               11               12               13                 14               15               16               17               18               19               20                 21               22               23               24               25               26               27                 28               29      2.5 mg   See details      30      2.5 mg         31      2.5 mg             Date Details   01/29 This INR check               How to take your warfarin dose     To take:  2.5 mg Take 0.5 of a 5 mg tablet.           February 2018 Details    Sun Mon Tue Wed Thu Fri Sat         1      2.5 mg         2      2.5 mg         3      2.5 mg           4      2.5 mg         5            6               7               8               9               10                 11               12               13               14               15               16                17                 18               19               20               21               22               23               24                 25               26               27               28                   Date Details   No additional details    Date of next INR:  2/5/2018         How to take your warfarin dose     To take:  2.5 mg Take 0.5 of a 5 mg tablet.

## 2018-02-05 ENCOUNTER — ANTICOAGULATION THERAPY VISIT (OUTPATIENT)
Dept: ANTICOAGULATION | Facility: OTHER | Age: 83
End: 2018-02-05
Attending: FAMILY MEDICINE
Payer: MEDICARE

## 2018-02-05 DIAGNOSIS — I10 ESSENTIAL HYPERTENSION, BENIGN: ICD-10-CM

## 2018-02-05 DIAGNOSIS — Z79.01 LONG-TERM (CURRENT) USE OF ANTICOAGULANTS: ICD-10-CM

## 2018-02-05 DIAGNOSIS — I26.99 PULMONARY EMBOLUS (H): Primary | ICD-10-CM

## 2018-02-05 LAB — INR POINT OF CARE: 2.9 (ref 0.86–1.14)

## 2018-02-05 PROCEDURE — 85610 PROTHROMBIN TIME: CPT | Mod: QW,ZL

## 2018-02-05 RX ORDER — WARFARIN SODIUM 2.5 MG/1
TABLET ORAL
Qty: 30 TABLET | Refills: 1 | Status: SHIPPED | OUTPATIENT
Start: 2018-02-05 | End: 2018-04-06

## 2018-02-05 RX ORDER — AMLODIPINE BESYLATE 5 MG/1
TABLET ORAL
Qty: 30 TABLET | Refills: 10 | Status: SHIPPED | OUTPATIENT
Start: 2018-02-05 | End: 2018-01-01 | Stop reason: ALTCHOICE

## 2018-02-05 NOTE — MR AVS SNAPSHOT
Mary Irizarry   2/5/2018 2:30 PM   Anticoagulation Therapy Visit    Description:  85 year old male   Provider:   ANTI COAGULATION   Department:  Hc Anti Coagulation           INR as of 2/5/2018     Today's INR 2.9      Anticoagulation Summary as of 2/5/2018     INR goal 2.0-3.0   Today's INR 2.9   Full instructions 1.25 mg on Wed; 2.5 mg all other days   Next INR check 2/19/2018    Indications   Pulmonary embolism (H) [I26.99]  Long-term (current) use of anticoagulants [Z79.01] [Z79.01]         Your next Anticoagulation Clinic appointment(s)     Feb 19, 2018  2:00 PM CST   Anticoagulation Visit with  ANTI COAGULATION   [facility] [place] ([facility] [place] )    [address]  [place] [address]   [phone]              February 2018 Details    Sun Mon Tue Wed Thu Fri Sat         1               2               3                 4               5      2.5 mg   See details      6      2.5 mg         7      1.25 mg         8      2.5 mg         9      2.5 mg         10      2.5 mg           11      2.5 mg         12      2.5 mg         13      2.5 mg         14      1.25 mg         15      2.5 mg         16      2.5 mg         17      2.5 mg           18      2.5 mg         19            20               21               22               23               24                 25               26               27               28                   Date Details   02/05 This INR check       Date of next INR:  2/19/2018         How to take your warfarin dose     To take:  1.25 mg Take 0.5 of a 2.5 mg tablet.    To take:  2.5 mg Take 1 of the 2.5 mg tablets.

## 2018-02-05 NOTE — PROGRESS NOTES
ANTICOAGULATION FOLLOW-UP CLINIC VISIT    Patient Name:  Mary Irizarry  Date:  2/5/2018  Contact Type:  Face to Face    SUBJECTIVE:     Patient Findings     Positives No Problem Findings           OBJECTIVE    INR Protime   Date Value Ref Range Status   02/05/2018 2.9 (A) 0.86 - 1.14 Final       ASSESSMENT / PLAN  INR assessment THER    Recheck INR In: 2 WEEKS    INR Location Clinic      Anticoagulation Summary as of 2/5/2018     INR goal 2.0-3.0   Today's INR 2.9   Maintenance plan 1.25 mg (2.5 mg x 0.5) on Wed; 2.5 mg (2.5 mg x 1) all other days   Full instructions 1.25 mg on Wed; 2.5 mg all other days   Weekly total 16.25 mg   Plan last modified Marylou Joshi RN (2/5/2018)   Next INR check 2/19/2018   Target end date     Indications   Pulmonary embolism (H) [I26.99]  Long-term (current) use of anticoagulants [Z79.01] [Z79.01]         Anticoagulation Episode Summary     INR check location     Preferred lab     Send INR reminders to  ANTICOAG POOL    Comments       Anticoagulation Care Providers     Provider Role Specialty Phone number    JABIER Kent MD [facility] [phone]            See the Encounter Report to view Anticoagulation Flowsheet and Dosing Calendar (Go to Encounters tab in chart review, and find the Anticoagulation Therapy Visit)        Marylou Joshi, RN

## 2018-02-12 ENCOUNTER — TRANSFERRED RECORDS (OUTPATIENT)
Dept: HEALTH INFORMATION MANAGEMENT | Facility: CLINIC | Age: 83
End: 2018-02-12

## 2018-02-12 LAB
ALT SERPL-CCNC: 44 U/L (ref 13–61)
ALT SERPL-CCNC: 44 U/L (ref 13–61)
AST SERPL-CCNC: 34 U/L (ref 15–37)
CHOLEST SERPL-MCNC: 180 MG/DL
CREAT SERPL-MCNC: 0.8 MG/DL (ref 0.7–1.2)
GLUCOSE SERPL-MCNC: 93 MG/DL (ref 70–100)
HBA1C MFR BLD: 5 % (ref 4–6)
HDLC SERPL-MCNC: 65 MG/DL
LDLC SERPL CALC-MCNC: 102 MG/DL
POTASSIUM SERPL-SCNC: 4 MMOL/L (ref 3.5–5)
TRIGL SERPL-MCNC: 63 MG/DL
TSH SERPL-ACNC: 1.23 UIU/ML (ref 0.3–5)

## 2018-02-19 ENCOUNTER — ANTICOAGULATION THERAPY VISIT (OUTPATIENT)
Dept: ANTICOAGULATION | Facility: OTHER | Age: 83
End: 2018-02-19
Attending: FAMILY MEDICINE
Payer: MEDICARE

## 2018-02-19 DIAGNOSIS — Z79.01 LONG-TERM (CURRENT) USE OF ANTICOAGULANTS: ICD-10-CM

## 2018-02-19 LAB — INR POINT OF CARE: 2.7 (ref 0.86–1.14)

## 2018-02-19 PROCEDURE — 36416 COLLJ CAPILLARY BLOOD SPEC: CPT | Mod: ZL

## 2018-02-19 NOTE — MR AVS SNAPSHOT
Mary Irizarry   2/19/2018 2:00 PM   Anticoagulation Therapy Visit    Description:  85 year old male   Provider:   ANTI COAGULATION   Department:  Hc Anti Coagulation           INR as of 2/19/2018     Today's INR 2.7      Anticoagulation Summary as of 2/19/2018     INR goal 2.0-3.0   Today's INR 2.7   Full instructions 1.25 mg on Wed; 2.5 mg all other days   Next INR check 3/19/2018    Indications   Pulmonary embolism (H) [I26.99]  Long-term (current) use of anticoagulants [Z79.01] [Z79.01]         Your next Anticoagulation Clinic appointment(s)     Mar 16, 2018  2:45 PM CDT   Anticoagulation Visit with  ANTI COAGULATION   [facility] [place] ([facility] )    [address] [place] [address]   [phone]              February 2018 Details    Sun Mon Tue Wed Thu Fri Sat         1               2               3                 4               5               6               7               8               9               10                 11               12               13               14               15               16               17                 18               19      2.5 mg   See details      20      2.5 mg         21      1.25 mg         22      2.5 mg         23      2.5 mg         24      2.5 mg           25      2.5 mg         26      2.5 mg         27      2.5 mg         28      1.25 mg             Date Details   02/19 This INR check               How to take your warfarin dose     To take:  1.25 mg Take 0.5 of a 2.5 mg tablet.    To take:  2.5 mg Take 1 of the 2.5 mg tablets.           March 2018 Details    Sun Mon Tue Wed Thu Fri Sat         1      2.5 mg         2      2.5 mg         3      2.5 mg           4      2.5 mg         5      2.5 mg         6      2.5 mg         7      1.25 mg         8      2.5 mg         9      2.5 mg         10      2.5 mg           11      2.5 mg         12      2.5 mg         13      2.5 mg         14      1.25 mg          15      2.5 mg         16      2.5 mg         17      2.5 mg           18      2.5 mg         19            20               21               22               23               24                 25               26               27               28               29               30               31                Date Details   No additional details    Date of next INR:  3/19/2018         How to take your warfarin dose     To take:  1.25 mg Take 0.5 of a 2.5 mg tablet.    To take:  2.5 mg Take 1 of the 2.5 mg tablets.

## 2018-02-19 NOTE — PROGRESS NOTES
ANTICOAGULATION FOLLOW-UP CLINIC VISIT    Patient Name:  Mary Irizarry  Date:  2/19/2018  Contact Type:  Face to Face    SUBJECTIVE:        OBJECTIVE    INR Protime   Date Value Ref Range Status   02/19/2018 2.7 (A) 0.86 - 1.14 Final       ASSESSMENT / PLAN  INR assessment THER    Recheck INR In: 4 WEEKS    INR Location Clinic      Anticoagulation Summary as of 2/19/2018     INR goal 2.0-3.0   Today's INR 2.7   Maintenance plan 1.25 mg (2.5 mg x 0.5) on Wed; 2.5 mg (2.5 mg x 1) all other days   Full instructions 1.25 mg on Wed; 2.5 mg all other days   Weekly total 16.25 mg   No change documented Marylou Joshi RN   Plan last modified Mayrlou Joshi RN (2/5/2018)   Next INR check 3/19/2018   Target end date     Indications   Pulmonary embolism (H) [I26.99]  Long-term (current) use of anticoagulants [Z79.01] [Z79.01]         Anticoagulation Episode Summary     INR check location     Preferred lab     Send INR reminders to [facility] POOL    Comments       Anticoagulation Care Providers     Provider Role Specialty Phone number    JABIER Kent MD [facility] [phone]            See the Encounter Report to view Anticoagulation Flowsheet and Dosing Calendar (Go to Encounters tab in chart review, and find the Anticoagulation Therapy Visit)        Marylou Joshi RN

## 2018-03-16 ENCOUNTER — OFFICE VISIT (OUTPATIENT)
Dept: FAMILY MEDICINE | Facility: OTHER | Age: 83
End: 2018-03-16
Attending: FAMILY MEDICINE
Payer: MEDICARE

## 2018-03-16 ENCOUNTER — ANTICOAGULATION THERAPY VISIT (OUTPATIENT)
Dept: ANTICOAGULATION | Facility: OTHER | Age: 83
End: 2018-03-16
Attending: FAMILY MEDICINE
Payer: MEDICARE

## 2018-03-16 VITALS
BODY MASS INDEX: 24.99 KG/M2 | WEIGHT: 159.2 LBS | DIASTOLIC BLOOD PRESSURE: 70 MMHG | HEIGHT: 67 IN | RESPIRATION RATE: 12 BRPM | HEART RATE: 61 BPM | SYSTOLIC BLOOD PRESSURE: 130 MMHG | TEMPERATURE: 96.7 F | OXYGEN SATURATION: 97 %

## 2018-03-16 DIAGNOSIS — Z79.01 LONG-TERM (CURRENT) USE OF ANTICOAGULANTS: ICD-10-CM

## 2018-03-16 DIAGNOSIS — I10 ESSENTIAL HYPERTENSION, BENIGN: ICD-10-CM

## 2018-03-16 DIAGNOSIS — I26.99 PULMONARY EMBOLISM (H): ICD-10-CM

## 2018-03-16 DIAGNOSIS — Z86.711 HISTORY OF PULMONARY EMBOLISM: Primary | ICD-10-CM

## 2018-03-16 LAB — INR POINT OF CARE: 2.9 (ref 0.86–1.14)

## 2018-03-16 PROCEDURE — 36416 COLLJ CAPILLARY BLOOD SPEC: CPT | Mod: ZL

## 2018-03-16 PROCEDURE — G0463 HOSPITAL OUTPT CLINIC VISIT: HCPCS

## 2018-03-16 PROCEDURE — 99213 OFFICE O/P EST LOW 20 MIN: CPT | Performed by: FAMILY MEDICINE

## 2018-03-16 ASSESSMENT — ANXIETY QUESTIONNAIRES
4. TROUBLE RELAXING: NOT AT ALL
3. WORRYING TOO MUCH ABOUT DIFFERENT THINGS: NOT AT ALL
GAD7 TOTAL SCORE: 0
7. FEELING AFRAID AS IF SOMETHING AWFUL MIGHT HAPPEN: NOT AT ALL
6. BECOMING EASILY ANNOYED OR IRRITABLE: NOT AT ALL
2. NOT BEING ABLE TO STOP OR CONTROL WORRYING: NOT AT ALL
5. BEING SO RESTLESS THAT IT IS HARD TO SIT STILL: NOT AT ALL
1. FEELING NERVOUS, ANXIOUS, OR ON EDGE: NOT AT ALL

## 2018-03-16 ASSESSMENT — PAIN SCALES - GENERAL: PAINLEVEL: MODERATE PAIN (5)

## 2018-03-16 NOTE — NURSING NOTE
"Chief Complaint   Patient presents with     Clinic Care Coordination - Follow-up       Initial /70 (BP Location: Left arm, Patient Position: Sitting, Cuff Size: Adult Regular)  Pulse 61  Temp 96.7  F (35.9  C) (Tympanic)  Resp 12  Ht 5' 6.5\" (1.689 m)  Wt 159 lb 3.2 oz (72.2 kg)  SpO2 97%  BMI 25.31 kg/m2 Estimated body mass index is 25.31 kg/(m^2) as calculated from the following:    Height as of this encounter: 5' 6.5\" (1.689 m).    Weight as of this encounter: 159 lb 3.2 oz (72.2 kg).  Medication Reconciliation: complete   Karen Allred    "

## 2018-03-16 NOTE — PROGRESS NOTES
SUBJECTIVE:                                                    Mary Irizarry is a 85 year old male who presents to clinic today for the following health issues:      Hypertension Follow-up      Outpatient blood pressures are being checked at home.  Results are 130-145/60-70.    Low Salt Diet: no added salt      Amount of exercise or physical activity: 6-7 days/week for an average of 45-60 minutes    Problems taking medications regularly: No    Medication side effects: none    Diet: low salt          Pulmonary embolism F/U      Duration: was in hospital on 01/10/2018    Description (location/character/radiation): pulmonary embolism    Intensity:  moderate    Accompanying signs and symptoms: N/A    History (similar episodes/previous evaluation): Was hospitalized on 01/10/2018    Precipitating or alleviating factors: N/A    Therapies tried and outcome: On coumadin  Patient states he thinks it's helping       This blood clot was unprovoked.  He also had one about 10 years ago.  [facility] lipids A1c renal function liver tests thyroid test B12 all were normal  [facility] [facility]    Mary Irizarry, 85 year old, male presents with   Chief Complaint   Patient presents with     Clinic Care Coordination - Follow-up       PAST MEDICAL HISTORY:  History reviewed. No pertinent past medical history.    PAST SURGICAL HISTORY:  Past Surgical History:   Procedure Laterality Date     GI SURGERY      prostatectomy       MEDICATIONS:  Prior to Admission medications    Medication Sig Start Date End Date Taking? Authorizing Provider   amLODIPine (NORVASC) 5 MG tablet TAKE 1 TABLET BY MOUTH DAILY WITH DINNER 2/5/18  Yes JABIER Kent MD   warfarin (COUMADIN) 2.5 MG tablet 1.25mg (1/2 pill) Wed and 2.5mg (1 pill) all other days or as directed by warfarin clinic 2/5/18  Yes JABIER Kent MD   ASPIRIN PO Take 81 mg by mouth daily   Yes Reported, Patient   doxazosin (CARDURA) 8 MG tablet Take 1  "tablet (8 mg) by mouth daily 9/11/17  Yes JABIER Kent MD       ALLERGIES:   No Known Allergies    ROS:  Constitutional, neuro, ENT, endocrine, pulmonary, cardiac, gastrointestinal, genitourinary, musculoskeletal, integument and psychiatric systems are negative, except as otherwise noted.      EXAM:  /70 (BP Location: Left arm, Patient Position: Sitting, Cuff Size: Adult Regular)  Pulse 61  Temp 96.7  F (35.9  C) (Tympanic)  Resp 12  Ht 5' 6.5\" (1.689 m)  Wt 159 lb 3.2 oz (72.2 kg)  SpO2 97%  BMI 25.31 kg/m2 Body mass index is 25.31 kg/(m^2).   GENERAL APPEARANCE: healthy, alert and no distress  EYES: Eyes grossly normal to inspection, PERRL and conjunctivae and sclerae normal  RESP: lungs clear to auscultation - no rales, rhonchi or wheezes  CV: regular rates and rhythm, normal S1 S2, no S3 or S4 and no murmur, click or rub  ORTHO: Has trace lower extremity edema  Lab/ X-ray  Results for orders placed or performed in visit on 03/16/18 (from the past 24 hour(s))   INR point of care   Result Value Ref Range    INR Protime 2.9 (A) 0.86 - 1.14       ASSESSMENT/PLAN:  (Z86.711) History of pulmonary embolism  (primary encounter diagnosis)  Comment:   Plan: ONC/HEME ADULT REFERRAL            (I10) Essential hypertension, benign  Comment:   Plan:      This episode happened in January.  We will have him see  provide us to evaluate for any genetic tendency for clotting.  If that workup is negative then after 9 months we can do a leg ultrasound and a d-dimer if both of those are normal then we can discontinue his Coumadin.  His blood pressure is controlled and [facility]      HARRIETT [place] MD  March 16, 2018          "

## 2018-03-16 NOTE — MR AVS SNAPSHOT
Mary Irizarry   3/16/2018 2:45 PM   Anticoagulation Therapy Visit    Description:  85 year old male   Provider:   ANTI COAGULATION   Department:  Hc Anti Coagulation           INR as of 3/16/2018     Today's INR 2.9      Anticoagulation Summary as of 3/16/2018     INR goal 2.0-3.0   Today's INR 2.9   Full instructions 1.25 mg on Wed; 2.5 mg all other days   Next INR check 4/27/2018    Indications   Pulmonary embolism (H) [I26.99]  Long-term (current) use of anticoagulants [Z79.01] [Z79.01]         Your next Anticoagulation Clinic appointment(s)     Apr 27, 2018  2:45 PM CDT   Anticoagulation Visit with  ANTI COAGULATION   [facility] [place] ([facility] [place] )    [address] [address]   [phone]              March 2018 Details    Sun Mon Tue Wed Thu Fri Sat         1               2               3                 4               5               6               7               8               9               10                 11               12               13               14               15               16      2.5 mg   See details      17      2.5 mg           18      2.5 mg         19      2.5 mg         20      2.5 mg         21      1.25 mg         22      2.5 mg         23      2.5 mg         24      2.5 mg           25      2.5 mg         26      2.5 mg         27      2.5 mg         28      1.25 mg         29      2.5 mg         30      2.5 mg         31      2.5 mg          Date Details   03/16 This INR check               How to take your warfarin dose     To take:  1.25 mg Take 0.5 of a 2.5 mg tablet.    To take:  2.5 mg Take 1 of the 2.5 mg tablets.           April 2018 Details    Sun Mon Tue Wed Thu Fri Sat     1      2.5 mg         2      2.5 mg         3      2.5 mg         4      1.25 mg         5      2.5 mg         6      2.5 mg         7      2.5 mg           8      2.5 mg         9      2.5 mg         10      2.5 mg         11      1.25 mg          12      2.5 mg         13      2.5 mg         14      2.5 mg           15      2.5 mg         16      2.5 mg         17      2.5 mg         18      1.25 mg         19      2.5 mg         20      2.5 mg         21      2.5 mg           22      2.5 mg         23      2.5 mg         24      2.5 mg         25      1.25 mg         26      2.5 mg         27            28                 29               30                     Date Details   No additional details    Date of next INR:  4/27/2018         How to take your warfarin dose     To take:  1.25 mg Take 0.5 of a 2.5 mg tablet.    To take:  2.5 mg Take 1 of the 2.5 mg tablets.

## 2018-03-16 NOTE — MR AVS SNAPSHOT
After Visit Summary   3/16/2018    Mary Irizarry    MRN: [id number]           Patient Information     Date Of Birth          8/27/1932        Visit Information        Provider Department      3/16/2018 3:15 PM JABIER Kent MD [facility]        Today's Diagnoses     History of pulmonary embolism    -  1    Essential hypertension, benign           Follow-ups after your visit        Additional Services     ONC/HEME ADULT REFERRAL       Your provider has referred you to: [facility]: [facility] [phone]   [email or web address]    Please be aware that coverage of these services is subject to the terms and limitations of your health insurance plan.  Call member services at your health plan with any benefit or coverage questions.      Please bring the following with you to your appointment:    (1) Any X-Rays, CTs or MRIs which have been performed.  Contact the facility where they were done to arrange for  prior to your scheduled appointment.   (2) List of current medications  (3) This referral request   (4) Any documents/labs given to you for this referral                  Your next 10 appointments already scheduled     Apr 27, 2018  2:45 PM CDT   Anticoagulation Visit with  ANTI COAGULATION   [facility] ([facility] )    [address]  [place] [address]   [phone]              Who to contact     If you have questions or need follow up information about today's clinic visit or your schedule please contact [facility] directly at [phone].  Normal or non-critical lab and imaging results will be communicated to you by MyChart, letter or phone within 4 business days after the clinic has received the results. If you do not hear from us within 7 days, please contact the clinic through MyChart or phone. If you have a critical or abnormal lab result, we will  "notify you by phone as soon as possible.  Submit refill requests through [employer]atchMe! or call your pharmacy and they will forward the refill request to us. Please allow 3 business days for your refill to be completed.          Additional Information About Your Visit        [employer] Information     CatchMe! lets you send messages to your doctor, view your test results, renew your prescriptions, schedule appointments and more. To sign up, go to [email or web address]! . Click on \"Log in\" on the left side of the screen, which will take you to the Welcome page. Then click on \"Sign up Now\" on the right side of the page.     You will be asked to enter the access code listed below, as well as some personal information. Please follow the directions to create your username and password.     Your access code is: [email or web address]  Expires: 2018 11:25 AM     Your access code will  in 90 days. If you need help or a new code, please call your [place] clinic or [phone].        Care EveryWhere ID     This is your Care EveryWhere ID. This could be used by other organizations to access your [place] medical records  [id number]        Your Vitals Were     Pulse Temperature Respirations Height Pulse Oximetry BMI (Body Mass Index)    61 96.7  F (35.9  C) (Tympanic) 12 5' 6.5\" (1.689 m) 97% 25.31 kg/m2       Blood Pressure from Last 3 Encounters:   18 130/70   18 130/80   18 145/71    Weight from Last 3 Encounters:   18 159 lb 3.2 oz (72.2 kg)   18 166 lb (75.3 kg)   18 155 lb 13.8 oz (70.7 kg)              We Performed the Following     ONC/HEME ADULT REFERRAL          Today's Medication Changes          These changes are accurate as of 3/16/18  3:57 PM.  If you have any questions, ask your nurse or doctor.               These medicines have changed or have updated prescriptions.        Dose/Directions    warfarin 2.5 MG tablet   Commonly known as:  COUMADIN   This may have changed:  Another " medication with the same name was removed. Continue taking this medication, and follow the directions you see here.   Used for:  Long-term (current) use of anticoagulants, Pulmonary embolus (H)   Changed by:  JABIER Kent MD        1.25mg (1/2 pill) Wed and 2.5mg (1 pill) all other days or as directed by warfarin clinic   Quantity:  30 tablet   Refills:  1                Primary Care Provider Office Phone # Fax #    JABIER Kent [phone] [address] [id number]        Equal Access to Services     [facility]: Hadii aad ku hadasho Soomaali, waaxda luqadaha, qaybta kaalmada adeegyada, delia perera . So [facility] [phone].    ATENCIÓN: Si habla español, tiene a quiroz disposición servicios gratuitos de asistencia lingüística. [facility] [phone].    We comply with applicable federal civil rights laws and [place] laws. We do not discriminate on the basis of race, color, national origin, age, disability, sex, sexual orientation, or gender identity.            Thank you!     Thank you for choosing [facility]  for your care. Our goal is always to provide you with excellent care. Hearing back from our patients is one way we can continue to improve our services. Please take a few minutes to complete the written survey that you may receive in the mail after your visit with us. Thank you!             Your Updated Medication List - Protect others around you: Learn how to safely use, store and throw away your medicines at www.disposemymeds.org.          This list is accurate as of 3/16/18  3:57 PM.  Always use your most recent med list.                   Brand Name Dispense Instructions for use Diagnosis    amLODIPine 5 MG tablet    NORVASC    30 tablet    TAKE 1 TABLET BY MOUTH DAILY WITH DINNER    Essential hypertension, benign       ASPIRIN PO      Take 81 mg by mouth daily        doxazosin 8 MG tablet    CARDURA    90 tablet    Take 1  tablet (8 mg) by mouth daily    Essential hypertension, benign       warfarin 2.5 MG tablet    COUMADIN    30 tablet    1.25mg (1/2 pill) Wed and 2.5mg (1 pill) all other days or as directed by warfarin clinic    Long-term (current) use of anticoagulants, Pulmonary embolus (H)

## 2018-03-16 NOTE — PROGRESS NOTES
"  ANTICOAGULATION FOLLOW-UP CLINIC VISIT    Patient Name:  Mary Irizarry  Date:  3/16/2018  Contact Type:  Face to Face    SUBJECTIVE:     Patient Findings     Positives No Problem Findings    Comments We discussed warfarin dosing and that she will no longer need the 5 mg size pills.  We discussed that with the \"green\" pills (2.5mg)  that he should take 1/2 pill on Wed and 1 full pill all other days. She should call warfarin clinic when she needs medication refill. Both verbalized understanding of instruction and has no questions. He will increase vit K intake a little bit.            OBJECTIVE    INR Protime   Date Value Ref Range Status   03/16/2018 2.9 (A) 0.86 - 1.14 Final       ASSESSMENT / PLAN  INR assessment THER    Recheck INR In: 6 WEEKS    INR Location Clinic      Anticoagulation Summary as of 3/16/2018     INR goal 2.0-3.0   Today's INR 2.9   Maintenance plan 1.25 mg (2.5 mg x 0.5) on Wed; 2.5 mg (2.5 mg x 1) all other days   Full instructions 1.25 mg on Wed; 2.5 mg all other days   Weekly total 16.25 mg   No change documented Marylou Joshi, RN   Plan last modified Marylou Joshi, RN (2/5/2018)   Next INR check 4/27/2018   Target end date     Indications   Pulmonary embolism (H) [I26.99]  Long-term (current) use of anticoagulants [Z79.01] [Z79.01]         Anticoagulation Episode Summary     INR check location     Preferred lab     Send INR reminders to  ANTICOAG POOL    Comments       Anticoagulation Care Providers     Provider Role Specialty Phone number    JABIER Kent MD [facility] [phone]            See the Encounter Report to view Anticoagulation Flowsheet and Dosing Calendar (Go to Encounters tab in chart review, and find the Anticoagulation Therapy Visit)        Marylou Joshi RN               "

## 2018-03-17 ASSESSMENT — ANXIETY QUESTIONNAIRES: GAD7 TOTAL SCORE: 0

## 2018-03-17 ASSESSMENT — PATIENT HEALTH QUESTIONNAIRE - PHQ9: SUM OF ALL RESPONSES TO PHQ QUESTIONS 1-9: 4

## 2018-04-09 ENCOUNTER — ONCOLOGY VISIT (OUTPATIENT)
Dept: ONCOLOGY | Facility: OTHER | Age: 83
End: 2018-04-09
Attending: FAMILY MEDICINE
Payer: MEDICARE

## 2018-04-09 VITALS
BODY MASS INDEX: 25.24 KG/M2 | OXYGEN SATURATION: 98 % | SYSTOLIC BLOOD PRESSURE: 149 MMHG | WEIGHT: 160.8 LBS | DIASTOLIC BLOOD PRESSURE: 59 MMHG | TEMPERATURE: 97 F | RESPIRATION RATE: 18 BRPM | HEART RATE: 59 BPM | HEIGHT: 67 IN

## 2018-04-09 DIAGNOSIS — Z86.711 HISTORY OF PULMONARY EMBOLISM: ICD-10-CM

## 2018-04-09 DIAGNOSIS — R10.84 ABDOMINAL PAIN, GENERALIZED: ICD-10-CM

## 2018-04-09 DIAGNOSIS — I26.99 PULMONARY EMBOLISM, BILATERAL (H): Primary | ICD-10-CM

## 2018-04-09 LAB
ALBUMIN SERPL-MCNC: 3.5 G/DL (ref 3.4–5)
ALP SERPL-CCNC: 59 U/L (ref 40–150)
ALT SERPL W P-5'-P-CCNC: 50 U/L (ref 0–70)
ANION GAP SERPL CALCULATED.3IONS-SCNC: 6 MMOL/L (ref 3–14)
AST SERPL W P-5'-P-CCNC: 35 U/L (ref 0–45)
BASOPHILS # BLD AUTO: 0 10E9/L (ref 0–0.2)
BASOPHILS NFR BLD AUTO: 0.4 %
BILIRUB SERPL-MCNC: 0.4 MG/DL (ref 0.2–1.3)
BUN SERPL-MCNC: 23 MG/DL (ref 7–30)
CALCIUM SERPL-MCNC: 8.8 MG/DL (ref 8.5–10.1)
CHLORIDE SERPL-SCNC: 109 MMOL/L (ref 94–109)
CO2 SERPL-SCNC: 28 MMOL/L (ref 20–32)
CREAT SERPL-MCNC: 0.9 MG/DL (ref 0.66–1.25)
D DIMER PPP DDU-MCNC: 242 NG/ML D-DU (ref 0–300)
DIFFERENTIAL METHOD BLD: NORMAL
EOSINOPHIL # BLD AUTO: 0.1 10E9/L (ref 0–0.7)
EOSINOPHIL NFR BLD AUTO: 1.2 %
ERYTHROCYTE [DISTWIDTH] IN BLOOD BY AUTOMATED COUNT: 13.7 % (ref 10–15)
GFR SERPL CREATININE-BSD FRML MDRD: 81 ML/MIN/1.7M2
GLUCOSE SERPL-MCNC: 93 MG/DL (ref 70–99)
HCT VFR BLD AUTO: 40.9 % (ref 40–53)
HGB BLD-MCNC: 13.3 G/DL (ref 13.3–17.7)
IMM GRANULOCYTES # BLD: 0 10E9/L (ref 0–0.4)
IMM GRANULOCYTES NFR BLD: 0.4 %
LDH SERPL L TO P-CCNC: 222 U/L (ref 85–227)
LYMPHOCYTES # BLD AUTO: 1.5 10E9/L (ref 0.8–5.3)
LYMPHOCYTES NFR BLD AUTO: 26.9 %
MCH RBC QN AUTO: 29.3 PG (ref 26.5–33)
MCHC RBC AUTO-ENTMCNC: 32.5 G/DL (ref 31.5–36.5)
MCV RBC AUTO: 90 FL (ref 78–100)
MONOCYTES # BLD AUTO: 0.4 10E9/L (ref 0–1.3)
MONOCYTES NFR BLD AUTO: 6.5 %
NEUTROPHILS # BLD AUTO: 3.7 10E9/L (ref 1.6–8.3)
NEUTROPHILS NFR BLD AUTO: 64.6 %
NRBC # BLD AUTO: 0 10*3/UL
NRBC BLD AUTO-RTO: 0 /100
PLATELET # BLD AUTO: 155 10E9/L (ref 150–450)
POTASSIUM SERPL-SCNC: 4.1 MMOL/L (ref 3.4–5.3)
PROT SERPL-MCNC: 7.5 G/DL (ref 6.8–8.8)
RBC # BLD AUTO: 4.54 10E12/L (ref 4.4–5.9)
SODIUM SERPL-SCNC: 143 MMOL/L (ref 133–144)
WBC # BLD AUTO: 5.7 10E9/L (ref 4–11)

## 2018-04-09 PROCEDURE — 86146 BETA-2 GLYCOPROTEIN ANTIBODY: CPT | Mod: ZL | Performed by: INTERNAL MEDICINE

## 2018-04-09 PROCEDURE — 85670 THROMBIN TIME PLASMA: CPT | Mod: ZL | Performed by: INTERNAL MEDICINE

## 2018-04-09 PROCEDURE — 81241 F5 GENE: CPT | Mod: ZL,GZ | Performed by: INTERNAL MEDICINE

## 2018-04-09 PROCEDURE — 85025 COMPLETE CBC W/AUTO DIFF WBC: CPT | Mod: ZL | Performed by: INTERNAL MEDICINE

## 2018-04-09 PROCEDURE — 86147 CARDIOLIPIN ANTIBODY EA IG: CPT | Mod: ZL | Performed by: INTERNAL MEDICINE

## 2018-04-09 PROCEDURE — 86147 CARDIOLIPIN ANTIBODY EA IG: CPT | Mod: ZL,59 | Performed by: INTERNAL MEDICINE

## 2018-04-09 PROCEDURE — 81240 F2 GENE: CPT | Mod: ZL,GZ | Performed by: INTERNAL MEDICINE

## 2018-04-09 PROCEDURE — 85379 FIBRIN DEGRADATION QUANT: CPT | Mod: ZL | Performed by: INTERNAL MEDICINE

## 2018-04-09 PROCEDURE — 99205 OFFICE O/P NEW HI 60 MIN: CPT | Performed by: INTERNAL MEDICINE

## 2018-04-09 PROCEDURE — 99000 SPECIMEN HANDLING OFFICE-LAB: CPT | Performed by: INTERNAL MEDICINE

## 2018-04-09 PROCEDURE — 80053 COMPREHEN METABOLIC PANEL: CPT | Mod: ZL | Performed by: INTERNAL MEDICINE

## 2018-04-09 PROCEDURE — 36415 COLL VENOUS BLD VENIPUNCTURE: CPT | Mod: ZL | Performed by: INTERNAL MEDICINE

## 2018-04-09 PROCEDURE — 85300 ANTITHROMBIN III ACTIVITY: CPT | Mod: ZL | Performed by: INTERNAL MEDICINE

## 2018-04-09 PROCEDURE — 83615 LACTATE (LD) (LDH) ENZYME: CPT | Mod: ZL | Performed by: INTERNAL MEDICINE

## 2018-04-09 PROCEDURE — 81291 MTHFR GENE: CPT | Mod: ZL | Performed by: INTERNAL MEDICINE

## 2018-04-09 PROCEDURE — 83090 ASSAY OF HOMOCYSTEINE: CPT | Mod: ZL | Performed by: INTERNAL MEDICINE

## 2018-04-09 PROCEDURE — G0463 HOSPITAL OUTPT CLINIC VISIT: HCPCS

## 2018-04-09 ASSESSMENT — PAIN SCALES - GENERAL: PAINLEVEL: NO PAIN (0)

## 2018-04-09 NOTE — PATIENT INSTRUCTIONS
"We would like to see you back after your CT Scan. You had labwork done today. The results will be discussed at your next appointment. You have also been scheduled for a CT Scan of your abdomen and pelvis.   Computed Tomography (CT)     During the test, relax and remain as still as you can.     Computed tomography (CT) is a test that combines X-rays and computer scans. The result is a detailed picture that can show problems with soft tissues, such as the lining of your sinuses, organs, such as your kidneys or lungs, blood vessels, and bones.  Tell the technologist   Be sure to tell the technologist if you:    Have allergies or kidney problems    Take diabetes medicine    Are pregnant or think you may be    Ate or drank anything before the test   Before your test    Be sure to tell your healthcare provider if you have ever had a reaction to contrast material (\"X-ray dye\"). If you have had a reaction, you may need to take medicine before your scan, so be sure to tell your provider ahead of time.     Be sure to mention the medicines you take. Ask if it's OK to take them before the test.     Follow any directions you re given for not eating or drinking before the procedure. Your provider will give you instructions if required. You may be required to drink contrast by mouth before arriving for the study depending on the type of exam you are having. Your provider or the imaging site will provide this for you.    The length of the procedure may vary, depending on your condition and your provider's practices.    Arrive on time to check in.    When you arrive, you may be asked to change into a hospital gown. Remove all metal near the part of your body that will be scanned, including jewelry, eyeglasses, and dentures. Women may need to remove any bra that has metal underwire.   During your test    You may be given contrast through an intravenous (IV) line or by mouth.    You will lie on a table. The table slides into the CT " scanner.    The technologist will ask you to hold your breath for a few seconds during your scan.  After your test    You can go back to your normal diet and activities right away. Any contrast will pass naturally through your body within a day.    Before leaving, you may need to wait briefly while your images are being reviewed. Your healthcare provider will discuss the test results with you during a follow-up appointment or over the phone.    Your next appointment is:__________________  Date Last Reviewed: 5/29/2015 2000-2017 [employer]. [address] [place] [address]. All rights reserved. This information is not intended as a substitute for professional medical care. Always follow your healthcare professional's instructions.            When you are in need of a refill of your medications, please call your pharmacy and they will send us the request. If you have any questions please call [phone]

## 2018-04-09 NOTE — NURSING NOTE
"Chief Complaint   Patient presents with     Consult     history of PE x 2/ Dr. LEÓN Kent referring       Initial /59  Pulse 59  Temp 97  F (36.1  C) (Tympanic)  Resp 18  Ht 1.702 m (5' 7\")  Wt 72.9 kg (160 lb 12.8 oz)  SpO2 98%  BMI 25.18 kg/m2 Estimated body mass index is 25.18 kg/(m^2) as calculated from the following:    Height as of this encounter: 1.702 m (5' 7\").    Weight as of this encounter: 72.9 kg (160 lb 12.8 oz).  Medication Reconciliation: complete     Patient was assessed using the NCCN psychosocial distress thermometer. Patient rated the score as a 0/10. Patient rated current stressors as none. Stressors will be brought to the attention of provider or Oncology RN Care Coordinator for a score of 6 or greater or per nurses discretion.   Jacki Douglass, RN              "

## 2018-04-09 NOTE — MR AVS SNAPSHOT
"              After Visit Summary   4/9/2018    Mary Irizarry    MRN: [id number]           Patient Information     Date Of Birth          8/27/1932        Visit Information        Provider Department      4/9/2018 2:00 PM Don Geiger MD [facility]        Today's Diagnoses     Pulmonary embolism, bilateral (H)    -  1    History of pulmonary embolism        Abdominal pain, generalized          Care Instructions    We would like to see you back after your CT Scan. You had labwork done today. The results will be discussed at your next appointment. You have also been scheduled for a CT Scan of your abdomen and pelvis.   Computed Tomography (CT)     During the test, relax and remain as still as you can.     Computed tomography (CT) is a test that combines X-rays and computer scans. The result is a detailed picture that can show problems with soft tissues, such as the lining of your sinuses, organs, such as your kidneys or lungs, blood vessels, and bones.  Tell the technologist   Be sure to tell the technologist if you:    Have allergies or kidney problems    Take diabetes medicine    Are pregnant or think you may be    Ate or drank anything before the test   Before your test    Be sure to tell your healthcare provider if you have ever had a reaction to contrast material (\"X-ray dye\"). If you have had a reaction, you may need to take medicine before your scan, so be sure to tell your provider ahead of time.     Be sure to mention the medicines you take. Ask if it's OK to take them before the test.     Follow any directions you re given for not eating or drinking before the procedure. Your provider will give you instructions if required. You may be required to drink contrast by mouth before arriving for the study depending on the type of exam you are having. Your provider or the imaging site will provide this for you.    The length of the procedure may vary, depending on your condition and your " provider's practices.    Arrive on time to check in.    When you arrive, you may be asked to change into a hospital gown. Remove all metal near the part of your body that will be scanned, including jewelry, eyeglasses, and dentures. Women may need to remove any bra that has metal underwire.   During your test    You may be given contrast through an intravenous (IV) line or by mouth.    You will lie on a table. The table slides into the CT scanner.    The technologist will ask you to hold your breath for a few seconds during your scan.  After your test    You can go back to your normal diet and activities right away. Any contrast will pass naturally through your body within a day.    Before leaving, you may need to wait briefly while your images are being reviewed. Your healthcare provider will discuss the test results with you during a follow-up appointment or over the phone.    Your next appointment is:__________________  Date Last Reviewed: 5/29/2015 2000-2017 [employer]. [address] [place] [address]. All rights reserved. This information is not intended as a substitute for professional medical care. Always follow your healthcare professional's instructions.            When you are in need of a refill of your medications, please call your pharmacy and they will send us the request. If you have any questions please call [phone]            Follow-ups after your visit        Your next 10 appointments already scheduled     Apr 13, 2018  2:00 PM CDT   (Arrive by 1:45 PM)   CT ABDOMEN PELVIS W CONTRAST with HICT1   HI CT SCAN ([facility] )    [address] [place] [id number] [phone]           Please bring any scans or X-rays taken at other hospitals, if similar tests were done. Also bring a list of your medicines, including vitamins, minerals and over-the-counter drugs. It is safest to leave personal items at home.  Be sure to tell your doctor:   If you  have any allergies.   If there s any chance you are pregnant.   If you are breastfeeding.  You may have contrast for this exam. To prepare:   Do not eat or drink for 2 hours before your exam. If you need to take medicine, you may take it with small sips of water. (We may ask you to take liquid medicine as well.)   The day before your exam, drink extra fluids at least six 8-ounce glasses (unless your doctor tells you to restrict your fluids).   You will be given instructions on how to drink the contrast.  Patients over 70 or patients with diabetes or kidney problems:   If you haven t had a blood test (creatinine test) within the last 30 days, the Cardiologist/Radiologist may require you to get this test prior to your exam.  If you have diabetes:   Continue to take your metformin medication on the day of your exam  Please wear loose clothing, such as a sweat suit or jogging clothes. Avoid snaps, zippers and other metal. We may ask you to undress and put on a hospital gown.  If you have any questions, please call the Imaging Department where you will have your exam.            Apr 17, 2018  1:00 PM CDT   (Arrive by 12:45 PM)   Return Visit with Don Geiger MD   [facility] ([facility] [place] )    [address]  [place] [address]   [phone]            Apr 27, 2018  2:45 PM CDT   Anticoagulation Visit with  ANTI COAGULATION   [facility] ([facility] [place] )    [address] [place] [address]   [phone]              Future tests that were ordered for you today     Open Future Orders        Priority Expected Expires Ordered    CT Abdomen Pelvis w Contrast Routine 4/10/2018 5/24/2018 4/9/2018            Who to contact     If you have questions or need follow up information about today's clinic visit or your schedule please contact [facility] directly at [phone].  Normal or non-critical lab and imaging results will be  "communicated to you by [employer], letter or phone within 4 business days after the clinic has received the results. If you do not hear from us within 7 days, please contact the clinic through [employer] or phone. If you have a critical or abnormal lab result, we will notify you by phone as soon as possible.  Submit refill requests through [employer] or call your pharmacy and they will forward the refill request to us. Please allow 3 business days for your refill to be completed.          Additional Information About Your Visit        [employer] Information     [employer] lets you send messages to your doctor, view your test results, renew your prescriptions, schedule appointments and more. To sign up, go to [email or web address] . Click on \"Log in\" on the left side of the screen, which will take you to the Welcome page. Then click on \"Sign up Now\" on the right side of the page.     You will be asked to enter the access code listed below, as well as some personal information. Please follow the directions to create your username and password.     Your access code is: [email or web address]  Expires: 2018 11:25 AM     Your access code will  in 90 days. If you need help or a new code, please call your [facility] clinic or [phone].        Care EveryWhere ID     This is your Care EveryWhere ID. This could be used by other organizations to access your [facility] medical records  [id number]        Your Vitals Were     Pulse Temperature Respirations Height Pulse Oximetry BMI (Body Mass Index)    59 97  F (36.1  C) (Tympanic) 18 1.702 m (5' 7\") 98% 25.18 kg/m2       Blood Pressure from Last 3 Encounters:   18 149/59   18 130/70   18 130/80    Weight from Last 3 Encounters:   18 72.9 kg (160 lb 12.8 oz)   18 72.2 kg (159 lb 3.2 oz)   18 75.3 kg (166 lb)              We Performed the Following     Antithrombin III     Beta 2 Glycoprotein 1 Antibody IgG     Beta 2 Glycoprotein 1 Antibody IgM     " Cardiolipin Susie IgG and IgM     CBC with platelets differential     Comprehensive metabolic panel     D-Dimer (HI,GH)     F2 prothrombin 18165T Mut Anal     Factor 5 leiden mutation analysis     Homocysteine     Lactate Dehydrogenase     MTHFR genotype     Thrombin time        Primary Care Provider Office Phone # Fax #    R Uriel Kent [phone] [address] [facility] [address]        Equal Access to Services     [facility] : Hadii aad ku hadasho Soomaali, waaxda luqadaha, qaybta kaalmada adeegyada, waxay idiin hayaan adeeg kharash la'aan ah. So [facility] [phone].    ATENCIÓN: Si habla español, tiene a quiroz disposición servicios gratuitos de asistencia lingüística. Llame al [phone].    We comply with applicable federal civil rights laws and [place] laws. We do not discriminate on the basis of race, color, national origin, age, disability, sex, sexual orientation, or gender identity.            Thank you!     Thank you for choosing [facility]  for your care. Our goal is always to provide you with excellent care. Hearing back from our patients is one way we can continue to improve our services. Please take a few minutes to complete the written survey that you may receive in the mail after your visit with us. Thank you!             Your Updated Medication List - Protect others around you: Learn how to safely use, store and throw away your medicines at www.disposemymeds.org.          This list is accurate as of 4/9/18  2:46 PM.  Always use your most recent med list.                   Brand Name Dispense Instructions for use Diagnosis    amLODIPine 5 MG tablet    NORVASC    30 tablet    TAKE 1 TABLET BY MOUTH DAILY WITH DINNER    Essential hypertension, benign       ASPIRIN PO      Take 81 mg by mouth daily        doxazosin 8 MG tablet    CARDURA    90 tablet    Take 1 tablet (8 mg) by mouth daily    Essential hypertension, benign       warfarin 2.5 MG tablet     COUMADIN    30 tablet    1.25mg (1/2 pill) Wed and 2.5mg (1 pill) all other days or as directed by warfarin clinic    Long-term (current) use of anticoagulants, Pulmonary embolus (H)

## 2018-04-10 ASSESSMENT — PATIENT HEALTH QUESTIONNAIRE - PHQ9: SUM OF ALL RESPONSES TO PHQ QUESTIONS 1-9: 1

## 2018-04-10 NOTE — PROGRESS NOTES
Visit Date:   04/09/2018      REASON FOR CONSULTATION:  Bilateral lower extremity DVT and bilateral pulmonary emboli, rule out hypercoagulable state.      REQUESTING PHYSICIAN:  Dr. Uriel Kent.      HISTORY OF PRESENT ILLNESS:  Mr. Irizarry is an 85-year-old white male with a previous history of hypertension.  We are asked to evaluate concerning new diagnosis of bilateral pulmonary emboli and bilateral DVT.  Apparently the patient had presented with shortness of breath to the emergency room on 01/10/2018.  At that time a CTA of the chest came back consistent with bilateral pulmonary emboli.  He also had bilateral lower extremity DVT.  Venous dopplers and findings were that the patient had a right lower extremity DVT within the common femoral vein, proximal deep femoral vein and the right external iliac vein, and also a clot seen within the superficial femoral vein, popliteal vein and posterior tibial veins in the left lower extremity consistent with bilateral DVTs.  The patient was admitted and started on Lovenox and then transitioned to Coumadin.  He was noted to have an elevated troponin level, and this was felt not to be due to cardiac etiology, initially was treated with aspirin and Plavix, which was discontinued, and now referred for further evaluation of hypercoagulable state.  The patient did  describe a remote history of his prostate removed.  It is unclear whether he had prostate cancer, but the last PSA was drawn on 01/11 and was normal at 0.27.  The patient's major complaint is now dyspnea on exertion and lower extremity swelling, which has improved.  He still feels somewhat weak and describes a heaviness in his legs which has improved with Coumadin.  He does have a history of coronary artery disease in the past with history of MI.  Otherwise, he denied any recent trauma or surgery says he has been somewhat sedentary over the winter because of arthritic pain.      PAST MEDICAL HISTORY:  As above, history  of coronary artery disease, status post MI, history of hypertension, questionable history of prostate cancer status post prostatectomy.      ALLERGIES:  HE HAS NO KNOWN DRUG ALLERGIES.        MEDICATIONS:   1.  Cardura 8 mg daily.   2.  Norvasc 5 mg daily.   3.  Aspirin 81 mg daily.   4.  Coumadin as per [facility].      SOCIAL HISTORY:  Tobacco is negative.  Alcohol is negative.  He is a retired .  He is originally from [place] and moved to the [place] at age 14.  He was also in the [employer] and is a .        FAMILY HISTORY:  Significant for son with DVT.      REVIEW OF SYSTEMS:   CENTRAL NERVOUS SYSTEM:  Negative for headaches.   RESPIRATORY:  Significant for dyspnea on exertion.  No cough, hemoptysis.   CARDIAC:  Negative for chest pain, palpitations.   GASTROINTESTINAL:  Negative for change in bowel habits, bright red blood per rectum or hematemesis.   MUSCULOSKELETAL:  Significant for occasional knee pain.   GENITOURINARY:  Negative for nocturia, urgency, frequency.   CONSTITUTIONAL:  Negative for fevers, night sweats, weight loss.        PHYSICAL EXAMINATION:   GENERAL:  He is an elderly white male in no acute distress.   VITAL SIGNS:  Blood pressure 149/59, pulse 59, respirations 18, temperature 98.7.   HEENT:  Atraumatic, normocephalic.   Oropharynx not nonerythematous.     NECK:  Supple.   LUNGS:  Clear to auscultation and percussion.   HEART:  Regular rhythm, S1, S2 normal.   ABDOMEN:  Soft, normoactive bowel sounds.  No masses or tenderness.     LYMPHATICS:  No cervical, supraclavicular, axillary or inguinal nodes.   EXTREMITIES:  Reveal bilateral lower extremity swelling.  There is negative Homans' sign.   NEUROLOGIC:  Grossly nonfocal.      LABORATORY DATA:  Reveal CBC:  White count 5.7, H and H 13.3 and 40.9, platelet count 155, BUN 23, creatinine 0.9.  LFTs are normal.      IMPRESSION:  Bilateral lower extremity DVT and bilateral pulmonary emboli, rule out hypercoagulable state.   Will obtain factor V Leiden, prothrombin 2 mutation, anticardiolipin antibodies, beta-2 glycoprotein antibodies, homocysteine level, empty, GFR DNA.  We also obtain a CT abdomen and pelvis to rule out occult malignancy, otherwise we will see the patient after the above workup.  He will continue Coumadin for now.      Seventy minutes was spent with the patient, greater than half the time was spent in counseling  spent discussing the role of hypercoagulability and DVTs and the fact that we need to draw labs.  We ordered labs as stated above for hypocoagulability workup.  Also we ordered CT abdomen and pelvis to rule out occult malignancy.         KHLOE GRIMM MD             D: 2018   T: 04/10/2018   MT: PAULA      Name:     SUSHMA LIANG   MRN:      [id number]        Account:      [id number]   :      1932           Visit Date:   2018      Document: [id number]       cc: JABIER Kent MD

## 2018-04-11 LAB
AT III ACT/NOR PPP CHRO: 110 % (ref 85–135)
HCYS SERPL-SCNC: 10 UMOL/L (ref 4–12)
THROMBIN TIME: 17 SEC (ref 13–19)

## 2018-04-12 LAB
CARDIOLIPIN ANTIBODY IGG: <1.6 GPL-U/ML (ref 0–19.9)
CARDIOLIPIN ANTIBODY IGM: 3.5 MPL-U/ML (ref 0–19.9)
COPATH REPORT: NORMAL
COPATH REPORT: NORMAL

## 2018-04-13 ENCOUNTER — HOSPITAL ENCOUNTER (OUTPATIENT)
Dept: CT IMAGING | Facility: HOSPITAL | Age: 83
Discharge: HOME | End: 2018-04-13
Attending: INTERNAL MEDICINE | Admitting: INTERNAL MEDICINE
Payer: MEDICARE

## 2018-04-13 DIAGNOSIS — R10.84 ABDOMINAL PAIN, GENERALIZED: ICD-10-CM

## 2018-04-13 LAB
B2 GLYCOPROT1 IGG SERPL IA-ACNC: 1.6 U/ML
B2 GLYCOPROT1 IGM SERPL IA-ACNC: 1.9 U/ML

## 2018-04-13 PROCEDURE — 74177 CT ABD & PELVIS W/CONTRAST: CPT | Mod: TC

## 2018-04-13 PROCEDURE — 25000128 H RX IP 250 OP 636: Performed by: RADIOLOGY

## 2018-04-13 RX ORDER — IOPAMIDOL 612 MG/ML
100 INJECTION, SOLUTION INTRAVASCULAR ONCE
Status: COMPLETED | OUTPATIENT
Start: 2018-04-13 | End: 2018-04-13

## 2018-04-13 RX ADMIN — IOPAMIDOL 100 ML: 612 INJECTION, SOLUTION INTRAVENOUS at 14:17

## 2018-04-13 RX ADMIN — DIATRIZOATE MEGLUMINE AND DIATRIZOATE SODIUM 30 ML: 660; 100 SOLUTION ORAL; RECTAL at 14:17

## 2018-04-17 ENCOUNTER — ONCOLOGY VISIT (OUTPATIENT)
Dept: ONCOLOGY | Facility: OTHER | Age: 83
End: 2018-04-17
Attending: INTERNAL MEDICINE
Payer: COMMERCIAL

## 2018-04-17 VITALS
SYSTOLIC BLOOD PRESSURE: 148 MMHG | HEART RATE: 64 BPM | WEIGHT: 162.3 LBS | BODY MASS INDEX: 25.47 KG/M2 | DIASTOLIC BLOOD PRESSURE: 63 MMHG | HEIGHT: 67 IN | TEMPERATURE: 97.6 F | OXYGEN SATURATION: 96 % | RESPIRATION RATE: 18 BRPM

## 2018-04-17 DIAGNOSIS — D68.59 PRIMARY HYPERCOAGULABLE STATE (H): Primary | ICD-10-CM

## 2018-04-17 PROCEDURE — G0463 HOSPITAL OUTPT CLINIC VISIT: HCPCS

## 2018-04-17 PROCEDURE — 99215 OFFICE O/P EST HI 40 MIN: CPT | Performed by: INTERNAL MEDICINE

## 2018-04-17 RX ORDER — FOLIC ACID 1 MG/1
1 TABLET ORAL DAILY
Qty: 90 TABLET | Refills: 3 | Status: SHIPPED | OUTPATIENT
Start: 2018-04-17 | End: 2019-01-01

## 2018-04-17 ASSESSMENT — PAIN SCALES - GENERAL: PAINLEVEL: NO PAIN (0)

## 2018-04-17 NOTE — MR AVS SNAPSHOT
After Visit Summary   4/17/2018    Mary Irizarry    MRN: [id number]           Patient Information     Date Of Birth          8/27/1932        Visit Information        Provider Department      4/17/2018 1:00 PM Don Geiger MD [facility] [place]        Today's Diagnoses     Primary hypercoagulable state (H)    -  1      Care Instructions    We would like to see you back in 6 months. Please come 1week(s) prior for lab work.      Your prescription for Folic Acid and Vitamin B12 has been sent to:   [facility] - [place], [place] - [address]  [address]  [place] [address]  Phone: [phone] Fax: [phone]      When you are in need of a refill of your medications, please call your pharmacy and they will send us the request. If you have any questions please call [phone]            Follow-ups after your visit        Your next 10 appointments already scheduled     Apr 27, 2018  2:45 PM CDT   Anticoagulation Visit with HC ANTI COAGULATION   [facility] [place] ([facility] [place] )    [address]  [place] [address]   [phone]            Oct 09, 2018  3:00 PM CDT   LAB with HC LAB   [facility] ([facility] [place] )    [address]  [place] [address]   [phone]            Oct 16, 2018  3:00 PM CDT   (Arrive by 2:45 PM)   Return Visit with Don Geiger MD   [facility] ([facility]    [address]  [place] [address]   [phone]              Future tests that were ordered for you today     Open Future Orders        Priority Expected Expires Ordered    Homocysteine Routine 10/1/2018 4/1/2019 4/17/2018    CBC with platelets differential Routine 10/1/2018 4/1/2019 4/17/2018    Comprehensive metabolic panel Routine 10/1/2018 4/1/2019 4/17/2018    Lactate Dehydrogenase Routine 10/1/2018 4/1/2019 4/17/2018    D-Dimer (HI,GH) Routine 10/1/2018 4/17/2019 4/17/2018        "     Who to contact     If you have questions or need follow up information about today's clinic visit or your schedule please contact [facility] [place] directly at [phone].  Normal or non-critical lab and imaging results will be communicated to you by MyChart, letter or phone within 4 business days after the clinic has received the results. If you do not hear from us within 7 days, please contact the clinic through MyChart or phone. If you have a critical or abnormal lab result, we will notify you by phone as soon as possible.  Submit refill requests through [employer] or call your pharmacy and they will forward the refill request to us. Please allow 3 business days for your refill to be completed.          Additional Information About Your Visit        [employer] Information     [employer] lets you send messages to your doctor, view your test results, renew your prescriptions, schedule appointments and more. To sign up, go to [email or web address] . Click on \"Log in\" on the left side of the screen, which will take you to the Welcome page. Then click on \"Sign up Now\" on the right side of the page.     You will be asked to enter the access code listed below, as well as some personal information. Please follow the directions to create your username and password.     Your access code is: [email or web address]  Expires: 2018  1:32 PM     Your access code will  in 90 days. If you need help or a new code, please call your [place] clinic or [phone].        Care EveryWhere ID     This is your Care EveryWhere ID. This could be used by other organizations to access your [place] medical records  [id number]        Your Vitals Were     Pulse Temperature Respirations Height Pulse Oximetry BMI (Body Mass Index)    64 97.6  F (36.4  C) (Tympanic) 18 1.702 m (5' 7\") 96% 25.42 kg/m2       Blood Pressure from Last 3 Encounters:   18 148/63   18 149/59   18 130/70    Weight from Last 3 Encounters: "   04/17/18 73.6 kg (162 lb 4.8 oz)   04/09/18 72.9 kg (160 lb 12.8 oz)   03/16/18 72.2 kg (159 lb 3.2 oz)                 Today's Medication Changes          These changes are accurate as of 4/17/18  1:32 PM.  If you have any questions, ask your nurse or doctor.               Start taking these medicines.        Dose/Directions    B-12 1000 MCG Caps   Used for:  Primary hypercoagulable state (H)   Started by:  Don Geiger MD        Dose:  1000 mcg   Take 1,000 mcg by mouth daily   Quantity:  90 capsule   Refills:  3       folic acid 1 MG tablet   Commonly known as:  FOLVITE   Used for:  Primary hypercoagulable state (H)   Started by:  Don Geiger MD        Dose:  1 mg   Take 1 tablet (1 mg) by mouth daily   Quantity:  90 tablet   Refills:  3            Where to get your medicines      These medications were sent to [facility] - [address] [address]     Phone:  [phone]     B-12 1000 MCG Caps    folic acid 1 MG tablet                Primary Care Provider Office Phone # Fax #    R Uriel Kent [phone]       [facility] [address]        Equal Access to Services     LEIDA XAVIER : Hadii savannah rodríguez hadasho Soomaali, waaxda luqadaha, qaybta kaalmada adeegyada, delia perera . So [facility] [phone].    ATENCIÓN: Si habla español, tiene a quiroz disposición servicios gratuitos de asistencia lingüística. Llame al [phone].    We comply with applicable federal civil rights laws and [place] laws. We do not discriminate on the basis of race, color, national origin, age, disability, sex, sexual orientation, or gender identity.            Thank you!     Thank you for choosing [facility]  for your care. Our goal is always to provide you with excellent care. Hearing back from our patients is one way we can continue to improve our services. Please take a few minutes to complete  the written survey that you may receive in the mail after your visit with us. Thank you!             Your Updated Medication List - Protect others around you: Learn how to safely use, store and throw away your medicines at www.disposemymeds.org.          This list is accurate as of 4/17/18  1:32 PM.  Always use your most recent med list.                   Brand Name Dispense Instructions for use Diagnosis    amLODIPine 5 MG tablet    NORVASC    30 tablet    TAKE 1 TABLET BY MOUTH DAILY WITH DINNER    Essential hypertension, benign       ASPIRIN PO      Take 81 mg by mouth daily        B-12 1000 MCG Caps     90 capsule    Take 1,000 mcg by mouth daily    Primary hypercoagulable state (H)       doxazosin 8 MG tablet    CARDURA    90 tablet    Take 1 tablet (8 mg) by mouth daily    Essential hypertension, benign       folic acid 1 MG tablet    FOLVITE    90 tablet    Take 1 tablet (1 mg) by mouth daily    Primary hypercoagulable state (H)       warfarin 2.5 MG tablet    COUMADIN    110 tablet    TAKE 1/2 TABLET (1.25MG) ON WEDNESDAYS AND 1 TABLET (2.5MG) ON ALL OTHER DAYS OR AS DIRECTED BY THE [facility]    Long-term (current) use of anticoagulants, Pulmonary embolus (H)

## 2018-04-17 NOTE — PROGRESS NOTES
Visit Date:   04/17/2018      HISTORY OF PRESENT ILLNESS:  Mr. Irizarry returns for followup of bilateral lower extremity DVT and bilateral pulmonary emboli, rule out hypercoagulable state.  We had seen the patient at the request of Dr. Uriel Kent on 04/09/2018.  At that time, Mr. Irizarry was an 85-year-old white male with a previous history of hypertension.  We are asked to evaluate concerning new diagnosis of bilateral pulmonary emboli and bilateral DVT.  Apparently the patient had presented with shortness of breath to the emergency room on 01/10/2018.  At that time, a CT of the chest came back consistent with bilateral pulmonary emboli.  He also had bilateral lower extremity deep venous thrombosis.  Venous Dopplers and the findings were that the patient had a right lower extremity DVT within the common femoral vein, proximal deep femoral vein and the right external iliac vein, and also a clot seen within this superficial femoral vein, popliteal vein and posterior tibial veins of the left lower extremity, consistent with bilateral DVTs.        The patient was admitted and started on Lovenox and then transitioned to Coumadin.  He was noted to have an elevated troponin level and this was felt not to be due to cardiac etiology initially treated with aspirin and Plavix, which was discontinued and was now referred for further evaluation of hypercoagulable state.  The patient did describe a remote history of prostate being removed.  It was unclear if he had prostate cancer.  His last PSA was normal at 0.27.  When we saw the patient, we wanted to rule out hypercoagulable state and workup was performed including factor V Leiden, prothrombin 2 mutation, this was negative.  Antiphospholipid profile was negative.  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  He also had a CT of the abdomen and pelvis and this was essentially negative except for a left inguinal hernia with fat-containing in the sac.   Otherwise, no other complaints.  The patient states in retrospect, he has had a mild bulge his left inguinal region but this has not bothered him or caused him pain or difficulty.  He denies any shortness of breath, chest pain, abdominal pain, fevers, night sweats, weight loss, increased leg swelling.      PHYSICAL EXAMINATION:   GENERAL:  He is an elderly white male in no acute distress.   VITAL SIGNS:  Blood pressure 140/62, pulse 64, respirations 18, temperature 97.6.   HEENT:  Atraumatic, normocephalic.  Oropharynx nonerythematous.   NECK:  Supple.   LUNGS:  Clear to auscultation and percussion.   HEART:  Regular rhythm, S1, S2 normal.   ABDOMEN:  Soft, normoactive bowel sounds.  No mass, nontender.   LYMPHATICS:  No cervical, supraclavicular or axillary nodes.   EXTREMITIES:  With 1+ ankle edema.   NEUROLOGIC:  Nonfocal.      LABORATORY DATA:  Reveal as above.  CBC is within normal limits.  BUN 23, creatinine 0.9.  LFTs are normal.  Thrombin time is 17, D-dimer is 242.  Antithrombin III was 110.      IMPRESSION:     1.  New onset bilateral lower extremity DVT and bilateral pulmonary emboli, likely due to hypercoagulable state, i.e., homozygous MTHFR DNA mutation with homozygous state with a C677T locus.  The patient would need to be on lifelong Coumadin.  We will also proceed with B6, B12 and folic acid supplementation.  The patient is already on B6 100 mg p.o. daily.  We will recommended he start B12 1000 mcg p.o. daily as well as folic acid 1 mg p.o. daily.  We would like to see the patient in 6 months, obtain CBC, CMP, LDH, homocysteine level.     2.  Left inguinal hernia.  The patient is asymptomatic for now.  If the patient develops symptoms, he should return for followup with his primary care provider.       Otherwise, 40 minutes was spent with the patient; greater than half the time spent in counseling and coordination of care.  We spent time discussing the fact he had a hypercoagulable state with an  MTHFR C677T homozygous mutation.  I spent time ordering labs and ordering folic acid and B12 supplementation.         KHLOE GRIMM MD             D: 2018   T: 2018   MT: TANNER      Name:     SUSHMA LIANG   MRN:      [id number]        Account:      [id number]   :      1932           Visit Date:   2018      Document: [id number]       cc: JABIER Kent MD

## 2018-04-17 NOTE — NURSING NOTE
"Chief Complaint   Patient presents with     RECHECK     CT Follow up       Initial /63  Pulse 64  Temp 97.6  F (36.4  C) (Tympanic)  Resp 18  Ht 1.702 m (5' 7\")  Wt 73.6 kg (162 lb 4.8 oz)  SpO2 96%  BMI 25.42 kg/m2 Estimated body mass index is 25.42 kg/(m^2) as calculated from the following:    Height as of this encounter: 1.702 m (5' 7\").    Weight as of this encounter: 73.6 kg (162 lb 4.8 oz).  Medication Reconciliation: complete     Patient was assessed using the NCCN psychosocial distress thermometer. Patient rated the score as a 0/10. Patient rated current stressors as none. Stressors will be brought to the attention of provider or Oncology RN Care Coordinator for a score of 6 or greater or per nurses discretion.   Jacki Douglass, RN          "

## 2018-04-17 NOTE — PATIENT INSTRUCTIONS
We would like to see you back in 6 months. Please come 1week(s) prior for lab work.          Your prescription for Folic Acid and Vitamin B12 has been sent to:   [facility] [place] - [address]  [address] [place] [address]  Phone: [phone] Fax: [phone]      When you are in need of a refill of your medications, please call your pharmacy and they will send us the request. If you have any questions please call [phone]

## 2018-04-18 ASSESSMENT — PATIENT HEALTH QUESTIONNAIRE - PHQ9: SUM OF ALL RESPONSES TO PHQ QUESTIONS 1-9: 2

## 2018-04-27 ENCOUNTER — ANTICOAGULATION THERAPY VISIT (OUTPATIENT)
Dept: ANTICOAGULATION | Facility: OTHER | Age: 83
End: 2018-04-27
Attending: FAMILY MEDICINE
Payer: MEDICARE

## 2018-04-27 DIAGNOSIS — Z79.01 LONG-TERM (CURRENT) USE OF ANTICOAGULANTS: ICD-10-CM

## 2018-04-27 LAB — INR POINT OF CARE: 1.5 (ref 0.86–1.14)

## 2018-04-27 PROCEDURE — 36416 COLLJ CAPILLARY BLOOD SPEC: CPT | Mod: ZL

## 2018-04-27 NOTE — PROGRESS NOTES
ANTICOAGULATION FOLLOW-UP CLINIC VISIT    Patient Name:  Mary Irizarry  Date:  4/27/2018  Contact Type:  Face to Face    SUBJECTIVE:     Patient Findings     Comments States Dr. pang put him on B12 and folic acid, other than that no new meds, no bleeding/bruising. No increase in vit K intake           OBJECTIVE    INR Protime   Date Value Ref Range Status   04/27/2018 1.5 (A) 0.86 - 1.14 Final       ASSESSMENT / PLAN  INR assessment SUB    Recheck INR In: 2 WEEKS    INR Location Clinic      Anticoagulation Summary as of 4/27/2018     INR goal 2.0-3.0   Today's INR 1.5!   Maintenance plan 1.25 mg (2.5 mg x 0.5) on Wed; 2.5 mg (2.5 mg x 1) all other days   Full instructions 4/27: 3.75 mg; Otherwise 1.25 mg on Wed; 2.5 mg all other days   Weekly total 16.25 mg   Plan last modified Marylou Joshi RN (2/5/2018)   Next INR check 5/11/2018   Target end date     Indications   Pulmonary embolism (H) [I26.99]  Long-term (current) use of anticoagulants [Z79.01] [Z79.01]         Anticoagulation Episode Summary     INR check location     Preferred lab     Send INR reminders to  ANTICOAG POOL    Comments       Anticoagulation Care Providers     Provider Role Specialty Phone number    JABIER Kent MD [facility] [phone]            See the Encounter Report to view Anticoagulation Flowsheet and Dosing Calendar (Go to Encounters tab in chart review, and find the Anticoagulation Therapy Visit)        Marylou Joshi RN

## 2018-04-27 NOTE — MR AVS SNAPSHOT
Mary Irizarry   4/27/2018 2:45 PM   Anticoagulation Therapy Visit    Description:  85 year old male   Provider:   ANTI COAGULATION   Department:  Hc Anti Coagulation           INR as of 4/27/2018     Today's INR 1.5!      Anticoagulation Summary as of 4/27/2018     INR goal 2.0-3.0   Today's INR 1.5!   Full instructions 4/27: 3.75 mg; Otherwise 1.25 mg on Wed; 2.5 mg all other days   Next INR check 5/11/2018    Indications   Pulmonary embolism (H) [I26.99]  Long-term (current) use of anticoagulants [Z79.01] [Z79.01]         Your next Anticoagulation Clinic appointment(s)     May 11, 2018  1:15 PM CDT   Anticoagulation Visit with  ANTI COAGULATION   [facility] [place] [facility] [place] [facility]    [address] [place] [address]   [phone]              April 2018 Details    Sun Mon Tue Wed Thu Fri Sat     1               2               3               4               5               6               7                 8               9               10               11               12               13               14                 15               16               17               18               19               20               21                 22               23               24               25               26               27      3.75 mg   See details      28      2.5 mg           29      2.5 mg         30      2.5 mg               Date Details   04/27 This INR check               How to take your warfarin dose     To take:  2.5 mg Take 1 of the 2.5 mg tablets.    To take:  3.75 mg Take 1.5 of the 2.5 mg tablets.           May 2018 Details    Sun Mon Tue Wed Thu Fri Sat       1      2.5 mg         2      1.25 mg         3      2.5 mg         4      2.5 mg         5      2.5 mg           6      2.5 mg         7      2.5 mg         8      2.5 mg         9      1.25 mg         10      2.5 mg         11            12                 13               14               15                16               17               18               19                 20               21               22               23               24               25               26                 27               28               29               30               31                  Date Details   No additional details    Date of next INR:  5/11/2018         How to take your warfarin dose     To take:  1.25 mg Take 0.5 of a 2.5 mg tablet.    To take:  2.5 mg Take 1 of the 2.5 mg tablets.

## 2018-05-11 ENCOUNTER — ANTICOAGULATION THERAPY VISIT (OUTPATIENT)
Dept: ANTICOAGULATION | Facility: OTHER | Age: 83
End: 2018-05-11
Attending: FAMILY MEDICINE
Payer: MEDICARE

## 2018-05-11 DIAGNOSIS — Z79.01 LONG-TERM (CURRENT) USE OF ANTICOAGULANTS: ICD-10-CM

## 2018-05-11 DIAGNOSIS — Z86.711 HISTORY OF PULMONARY EMBOLISM: Primary | ICD-10-CM

## 2018-05-11 LAB — INR POINT OF CARE: 1.8 (ref 0.86–1.14)

## 2018-05-11 PROCEDURE — 85610 PROTHROMBIN TIME: CPT | Mod: QW,ZL

## 2018-05-11 RX ORDER — WARFARIN SODIUM 2.5 MG/1
TABLET ORAL
Qty: 100 TABLET | Refills: 3 | Status: SHIPPED | OUTPATIENT
Start: 2018-05-11 | End: 2018-08-04

## 2018-05-11 NOTE — MR AVS SNAPSHOT
Mary Irizarry   5/11/2018 1:15 PM   Anticoagulation Therapy Visit    Description:  85 year old male   Provider:   ANTI COAGULATION   Department:  Hc Anti Coagulation           INR as of 5/11/2018     Today's INR 1.8!      Anticoagulation Summary as of 5/11/2018     INR goal 2.0-3.0   Today's INR 1.8!   Full instructions 5/11: 3.75 mg; Otherwise 2.5 mg every day   Next INR check 5/29/2018    Indications   Pulmonary embolism (H) [I26.99]  Long-term (current) use of anticoagulants [Z79.01] [Z79.01]         Your next Anticoagulation Clinic appointment(s)     May 11, 2018  1:15 PM CDT   Anticoagulation Visit with  ANTI COAGULATION   [facility] [place] ([facility] )    [address]  [place] [address]   [phone]            May 29, 2018  1:15 PM CDT   Anticoagulation Visit with  ANTI COAGULATION   [facility]    [address]  [place] [address]   [phone]              May 2018 Details    Sun Mon Tue Wed Thu Fri Sat       1               2               3               4               5                 6               7               8               9               10               11      3.75 mg   See details      12      2.5 mg           13      2.5 mg         14      2.5 mg         15      2.5 mg         16      2.5 mg         17      2.5 mg         18      2.5 mg         19      2.5 mg           20      2.5 mg         21      2.5 mg         22      2.5 mg         23      2.5 mg         24      2.5 mg         25      2.5 mg         26      2.5 mg           27      2.5 mg         28      2.5 mg         29            30               31                  Date Details   05/11 This INR check       Date of next INR:  5/29/2018         How to take your warfarin dose     To take:  2.5 mg Take 1 of the 2.5 mg tablets.    To take:  3.75 mg Take 1.5 of the 2.5 mg tablets.

## 2018-05-11 NOTE — PROGRESS NOTES
ANTICOAGULATION FOLLOW-UP CLINIC VISIT    Patient Name:  Mary Irizarry  Date:  5/11/2018  Contact Type:  Face to Face    SUBJECTIVE:     Patient Findings     Positives Activity level change    Comments States is more active now that weather is nicer and he is feeling better           OBJECTIVE    INR Protime   Date Value Ref Range Status   05/11/2018 1.8 (A) 0.86 - 1.14 Final       ASSESSMENT / PLAN  INR assessment SUB    Recheck INR In: 2 WEEKS    INR Location Clinic      Anticoagulation Summary as of 5/11/2018     INR goal 2.0-3.0   Today's INR 1.8!   Maintenance plan 2.5 mg (2.5 mg x 1) every day   Full instructions 5/11: 3.75 mg; Otherwise 2.5 mg every day   Weekly total 17.5 mg   Plan last modified Marylou Joshi RN (5/11/2018)   Next INR check 5/29/2018   Target end date     Indications   Pulmonary embolism (H) [I26.99]  Long-term (current) use of anticoagulants [Z79.01] [Z79.01]         Anticoagulation Episode Summary     INR check location     Preferred lab     Send INR reminders to  ANTICOAG POOL    Comments       Anticoagulation Care Providers     Provider Role Specialty Phone number    JABIER Kent MD [facility] [phone]            See the Encounter Report to view Anticoagulation Flowsheet and Dosing Calendar (Go to Encounters tab in chart review, and find the Anticoagulation Therapy Visit)        Marylou Joshi RN

## 2018-05-29 ENCOUNTER — ANTICOAGULATION THERAPY VISIT (OUTPATIENT)
Dept: ANTICOAGULATION | Facility: OTHER | Age: 83
End: 2018-05-29
Attending: FAMILY MEDICINE
Payer: MEDICARE

## 2018-05-29 DIAGNOSIS — Z79.01 LONG-TERM (CURRENT) USE OF ANTICOAGULANTS: ICD-10-CM

## 2018-05-29 LAB — INR POINT OF CARE: 2.4 (ref 0.86–1.14)

## 2018-05-29 PROCEDURE — 36416 COLLJ CAPILLARY BLOOD SPEC: CPT | Mod: ZL

## 2018-05-29 NOTE — PROGRESS NOTES
ANTICOAGULATION FOLLOW-UP CLINIC VISIT    Patient Name:  Mary Irizarry  Date:  5/29/2018  Contact Type:  Face to Face    SUBJECTIVE:     Patient Findings     Positives No Problem Findings           OBJECTIVE    INR Protime   Date Value Ref Range Status   05/29/2018 2.4 (A) 0.86 - 1.14 Final       ASSESSMENT / PLAN  INR assessment THER    Recheck INR In: 6 WEEKS    INR Location Clinic      Anticoagulation Summary as of 5/29/2018     INR goal 2.0-3.0   Today's INR 2.4   Warfarin maintenance plan 2.5 mg (2.5 mg x 1) every day   Full warfarin instructions 2.5 mg every day   Weekly warfarin total 17.5 mg   No change documented Marylou Joshi RN   Plan last modified Marylou Joshi RN (5/11/2018)   Next INR check 7/10/2018   Target end date     Indications   Pulmonary embolism (H) [I26.99]  Long-term (current) use of anticoagulants [Z79.01] [Z79.01]         Anticoagulation Episode Summary     INR check location     Preferred lab     Send INR reminders to [facility] POOL    Comments       Anticoagulation Care Providers     Provider Role Specialty Phone number    JABIER Kent MD [facility] [phone]            See the Encounter Report to view Anticoagulation Flowsheet and Dosing Calendar (Go to Encounters tab in chart review, and find the Anticoagulation Therapy Visit)        Maryolu Joshi RN

## 2018-05-29 NOTE — MR AVS SNAPSHOT
Mary Irizarry   5/29/2018 1:15 PM   Anticoagulation Therapy Visit    Description:  85 year old male   Provider:   ANTI COAGULATION   Department:  [facility] Anti Coagulation           INR as of 5/29/2018     Today's INR 2.4      Anticoagulation Summary as of 5/29/2018     INR goal 2.0-3.0   Today's INR 2.4   Full warfarin instructions 2.5 mg every day   Next INR check 7/10/2018    Indications   Pulmonary embolism (H) [I26.99]  Long-term (current) use of anticoagulants [Z79.01] [Z79.01]         Your next Anticoagulation Clinic appointment(s)     Jul 10, 2018  8:15 AM CDT   Anticoagulation Visit with  ANTI COAGULATION   [facility] [place] ([facility] )    [address] [place] [address]   [phone]              May 2018 Details    Sun Mon Tue Wed Thu Fri Sat       1               2               3               4               5                 6               7               8               9               10               11               12                 13               14               15               16               17               18               19                 20               21               22               23               24               25               26                 27               28               29      2.5 mg   See details      30      2.5 mg         31      2.5 mg            Date Details   05/29 This INR check               How to take your warfarin dose     To take:  2.5 mg Take 1 of the 2.5 mg tablets.           June 2018 Details    Sun Mon Tue Wed Thu Fri Sat          1      2.5 mg         2      2.5 mg           3      2.5 mg         4      2.5 mg         5      2.5 mg         6      2.5 mg         7      2.5 mg         8      2.5 mg         9      2.5 mg           10      2.5 mg         11      2.5 mg         12      2.5 mg         13      2.5 mg         14      2.5 mg         15      2.5 mg         16      2.5 mg           17      2.5 mg          18      2.5 mg         19      2.5 mg         20      2.5 mg         21      2.5 mg         22      2.5 mg         23      2.5 mg           24      2.5 mg         25      2.5 mg         26      2.5 mg         27      2.5 mg         28      2.5 mg         29      2.5 mg         30      2.5 mg          Date Details   No additional details            How to take your warfarin dose     To take:  2.5 mg Take 1 of the 2.5 mg tablets.           July 2018 Details    Sun Mon Tue Wed Thu Fri Sat     1      2.5 mg         2      2.5 mg         3      2.5 mg         4      2.5 mg         5      2.5 mg         6      2.5 mg         7      2.5 mg           8      2.5 mg         9      2.5 mg         10            11               12               13               14                 15               16               17               18               19               20               21                 22               23               24               25               26               27               28                 29               30               31                    Date Details   No additional details    Date of next INR:  7/10/2018         How to take your warfarin dose     To take:  2.5 mg Take 1 of the 2.5 mg tablets.

## 2018-07-09 ENCOUNTER — HOSPITAL ENCOUNTER (EMERGENCY)
Facility: HOSPITAL | Age: 83
Discharge: LEFT BEFORE DISCHARGE | End: 2018-07-09
Admitting: FAMILY MEDICINE
Payer: MEDICARE

## 2018-07-09 VITALS
RESPIRATION RATE: 16 BRPM | TEMPERATURE: 98.3 F | DIASTOLIC BLOOD PRESSURE: 82 MMHG | SYSTOLIC BLOOD PRESSURE: 106 MMHG | OXYGEN SATURATION: 98 %

## 2018-07-09 PROCEDURE — 40000268 ZZH STATISTIC NO CHARGES

## 2018-07-10 ENCOUNTER — ANTICOAGULATION THERAPY VISIT (OUTPATIENT)
Dept: ANTICOAGULATION | Facility: OTHER | Age: 83
End: 2018-07-10
Attending: FAMILY MEDICINE
Payer: MEDICARE

## 2018-07-10 ENCOUNTER — HOSPITAL ENCOUNTER (EMERGENCY)
Facility: HOSPITAL | Age: 83
Discharge: HOME | End: 2018-07-10
Attending: FAMILY MEDICINE | Admitting: FAMILY MEDICINE
Payer: MEDICARE

## 2018-07-10 VITALS
SYSTOLIC BLOOD PRESSURE: 139 MMHG | RESPIRATION RATE: 16 BRPM | TEMPERATURE: 97 F | OXYGEN SATURATION: 98 % | HEART RATE: 67 BPM | DIASTOLIC BLOOD PRESSURE: 86 MMHG

## 2018-07-10 DIAGNOSIS — Z79.01 LONG-TERM (CURRENT) USE OF ANTICOAGULANTS: ICD-10-CM

## 2018-07-10 DIAGNOSIS — K92.2 GASTROINTESTINAL HEMORRHAGE, UNSPECIFIED GASTROINTESTINAL HEMORRHAGE TYPE: ICD-10-CM

## 2018-07-10 LAB
ALBUMIN SERPL-MCNC: 3.2 G/DL (ref 3.4–5)
ALP SERPL-CCNC: 54 U/L (ref 40–150)
ALT SERPL W P-5'-P-CCNC: 31 U/L (ref 0–70)
ANION GAP SERPL CALCULATED.3IONS-SCNC: 7 MMOL/L (ref 3–14)
AST SERPL W P-5'-P-CCNC: 21 U/L (ref 0–45)
BASOPHILS # BLD AUTO: 0 10E9/L (ref 0–0.2)
BASOPHILS NFR BLD AUTO: 0.4 %
BILIRUB SERPL-MCNC: 0.5 MG/DL (ref 0.2–1.3)
BUN SERPL-MCNC: 19 MG/DL (ref 7–30)
CALCIUM SERPL-MCNC: 8.2 MG/DL (ref 8.5–10.1)
CHLORIDE SERPL-SCNC: 112 MMOL/L (ref 94–109)
CO2 SERPL-SCNC: 26 MMOL/L (ref 20–32)
CREAT SERPL-MCNC: 0.88 MG/DL (ref 0.66–1.25)
DIFFERENTIAL METHOD BLD: ABNORMAL
EOSINOPHIL # BLD AUTO: 0.1 10E9/L (ref 0–0.7)
EOSINOPHIL NFR BLD AUTO: 1.2 %
ERYTHROCYTE [DISTWIDTH] IN BLOOD BY AUTOMATED COUNT: 13.7 % (ref 10–15)
GFR SERPL CREATININE-BSD FRML MDRD: 82 ML/MIN/1.7M2
GLUCOSE SERPL-MCNC: 105 MG/DL (ref 70–99)
HCT VFR BLD AUTO: 33.3 % (ref 40–53)
HEMOCCULT STL QL IA: POSITIVE
HGB BLD-MCNC: 10.9 G/DL (ref 13.3–17.7)
IMM GRANULOCYTES # BLD: 0 10E9/L (ref 0–0.4)
IMM GRANULOCYTES NFR BLD: 0.4 %
INR POINT OF CARE: 3.6 (ref 0.86–1.14)
INR PPP: 2.92 (ref 0.8–1.2)
LYMPHOCYTES # BLD AUTO: 1.6 10E9/L (ref 0.8–5.3)
LYMPHOCYTES NFR BLD AUTO: 32.4 %
MCH RBC QN AUTO: 28.8 PG (ref 26.5–33)
MCHC RBC AUTO-ENTMCNC: 32.7 G/DL (ref 31.5–36.5)
MCV RBC AUTO: 88 FL (ref 78–100)
MONOCYTES # BLD AUTO: 0.4 10E9/L (ref 0–1.3)
MONOCYTES NFR BLD AUTO: 7.2 %
NEUTROPHILS # BLD AUTO: 2.8 10E9/L (ref 1.6–8.3)
NEUTROPHILS NFR BLD AUTO: 58.4 %
NRBC # BLD AUTO: 0 10*3/UL
NRBC BLD AUTO-RTO: 0 /100
PLATELET # BLD AUTO: 172 10E9/L (ref 150–450)
POTASSIUM SERPL-SCNC: 3.6 MMOL/L (ref 3.4–5.3)
PROT SERPL-MCNC: 6.5 G/DL (ref 6.8–8.8)
RBC # BLD AUTO: 3.78 10E12/L (ref 4.4–5.9)
SODIUM SERPL-SCNC: 145 MMOL/L (ref 133–144)
WBC # BLD AUTO: 4.9 10E9/L (ref 4–11)

## 2018-07-10 PROCEDURE — 99284 EMERGENCY DEPT VISIT MOD MDM: CPT | Performed by: FAMILY MEDICINE

## 2018-07-10 PROCEDURE — 96374 THER/PROPH/DIAG INJ IV PUSH: CPT

## 2018-07-10 PROCEDURE — 85610 PROTHROMBIN TIME: CPT | Mod: 91 | Performed by: FAMILY MEDICINE

## 2018-07-10 PROCEDURE — 82274 ASSAY TEST FOR BLOOD FECAL: CPT | Performed by: FAMILY MEDICINE

## 2018-07-10 PROCEDURE — 80053 COMPREHEN METABOLIC PANEL: CPT | Performed by: FAMILY MEDICINE

## 2018-07-10 PROCEDURE — 25000125 ZZHC RX 250: Performed by: FAMILY MEDICINE

## 2018-07-10 PROCEDURE — 85610 PROTHROMBIN TIME: CPT | Mod: QW,ZL

## 2018-07-10 PROCEDURE — 99284 EMERGENCY DEPT VISIT MOD MDM: CPT | Mod: 25

## 2018-07-10 PROCEDURE — 85025 COMPLETE CBC W/AUTO DIFF WBC: CPT | Performed by: FAMILY MEDICINE

## 2018-07-10 RX ORDER — OMEPRAZOLE 40 MG/1
40 CAPSULE, DELAYED RELEASE ORAL DAILY
Qty: 30 CAPSULE | Refills: 0 | Status: SHIPPED | OUTPATIENT
Start: 2018-07-10 | End: 2018-08-09

## 2018-07-10 RX ORDER — ASPIRIN 81 MG
200 TABLET, DELAYED RELEASE (ENTERIC COATED) ORAL DAILY
Qty: 60 TABLET | Refills: 1 | Status: SHIPPED | OUTPATIENT
Start: 2018-07-10 | End: 2018-08-09

## 2018-07-10 RX ORDER — SODIUM CHLORIDE 9 MG/ML
1000 INJECTION, SOLUTION INTRAVENOUS CONTINUOUS
Status: DISCONTINUED | OUTPATIENT
Start: 2018-07-10 | End: 2018-07-10 | Stop reason: HOSPADM

## 2018-07-10 RX ADMIN — PANTOPRAZOLE SODIUM 40 MG: 40 INJECTION, POWDER, FOR SOLUTION INTRAVENOUS at 09:31

## 2018-07-10 ASSESSMENT — ENCOUNTER SYMPTOMS
FATIGUE: 0
WEAKNESS: 0
CHILLS: 0
ARTHRALGIAS: 1
DIAPHORESIS: 0
HEMATURIA: 0
CONSTIPATION: 1
PSYCHIATRIC NEGATIVE: 1
BLOOD IN STOOL: 1
VOMITING: 0
NAUSEA: 0
DYSURIA: 0
ACTIVITY CHANGE: 1
SHORTNESS OF BREATH: 0
HEADACHES: 0
DIARRHEA: 0

## 2018-07-10 NOTE — ED AVS SNAPSHOT
[facility]    [address]    [place] [address]    Phone:  [phone]                                       Mary Irizarry   MRN: [id number]    Department:  [facility]   Date of Visit:  7/10/2018           After Visit Summary Signature Page     I have received my discharge instructions, and my questions have been answered. I have discussed any challenges I see with this plan with the nurse or doctor.    ..........................................................................................................................................  Patient/Patient Representative Signature      ..........................................................................................................................................  Patient Representative Print Name and Relationship to Patient    ..................................................               ................................................  Date                                            Time    ..........................................................................................................................................  Reviewed by Signature/Title    ...................................................              ..............................................  Date                                                            Time

## 2018-07-10 NOTE — ED AVS SNAPSHOT
[facility]    [address] [address]    Phone:  [phone]                                       Mary Irizarry   MRN: [id number]    Department:  [facility]   Date of Visit:  7/10/2018           Patient Information     Date Of Birth          8/27/1932        Your diagnoses for this visit were:     Gastrointestinal hemorrhage, unspecified gastrointestinal hemorrhage type        You were seen by Marion Alexis MD.      Follow-up Information     Follow up with JABIER [place] MD.    Specialty:  Family Practice    Why:  As needed    Contact information:    [address] [facility] [address] [phone]          Follow up with Parmjit Tesfaye MD.    Specialty:  Surgery    Contact information:    [address] [facility] [address] [phone]          Discharge Instructions         Upper GI Bleeding (Stable)  Your upper gastrointestinal (GI) tract includes your esophagus, stomach, and upper small intestine. You have signs of bleeding from your upper GI tract. You may have vomited or coughed up blood or coffee-ground like material. Or you may have black or tarry stools. Very small amounts of GI bleeding may not be visible and can only be found by a test of the stool.  Causes of upper GI bleeding can include:    Tear in the lining of the esophagus    Enlarged veins in the esophagus    An ulcer in the stomach or top of the small intestine    Severe irritation of the stomach    Inflammation of the digestive tract  A bloody nose or mouth or dental problems may cause blood to be swallowed and vomited up again. This is not true GI bleeding. Iron supplements and medicines for diarrhea and upset stomach can cause black stools. This is not GI bleeding and is not a cause for concern.  Home care  Depending on the cause of your bleeding, care may include the following:    You may be given medicines to help protect your GI tract, treat your problem, and promote  healing. Take these as directed.    Don't take NSAIDs, such as aspirin, ibuprofen, or naproxen. They can irritate the stomach and cause further bleeding. If you are taking these medicines for other medical reasons, talk to your healthcare provider before you stop them.      Don't use alcohol, caffeine, or tobacco. These can delay healing and make your problem worse.  Follow-up care  Follow up with your healthcare provider, or as advised. Further tests may need to be done to find the cause of your bleeding.  When to seek medical advice  Call your healthcare provider right away for any of the following:    Stomach pain appears or gets worse    Pain spreads to the neck, back, shoulder, or arm    Weakness or dizziness    Swelling of your abdomen    Red blood in your stool    Fever of 100.4 F (38 C) or higher, or as directed by your healthcare provider  Call 911  Call 911 if any of these occur:    Trouble breathing or swallowing    Severe dizziness    Loss of consciousness    Vomiting blood or large amounts of blood in the stool  Date Last Reviewed: 6/24/2015 2000-2017 [employer]. [address] [place] [address]. All rights reserved. This information is not intended as a substitute for professional medical care. Always follow your healthcare professional's instructions.          Your next 10 appointments already scheduled     Jul 19, 2018  8:15 AM CDT   Anticoagulation Visit with HC ANTI COAGULATION   [facility] )    [address] [place] [address]   [phone]            Oct 08, 2018  3:00 PM CDT   LAB with HC LAB   [facility] ([facility] [place] )    [address]  [place] [address]   [phone]            Oct 15, 2018  3:00 PM CDT   (Arrive by 2:45 PM)   Return Visit with Don Geiger MD   [facility] ([facility] [place] )    [address] [place] [address]    [phone]                 Review of your medicines      START taking        Dose / Directions Last dose taken    docusate sodium 100 MG tablet   Commonly known as:  COLACE   Dose:  200 mg   Quantity:  60 tablet        Take 200 mg by mouth daily   Refills:  1        omeprazole 40 MG capsule   Commonly known as:  priLOSEC   Dose:  40 mg   Quantity:  30 capsule        Take 1 capsule (40 mg) by mouth daily   Refills:  0          Our records show that you are taking the medicines listed below. If these are incorrect, please call your family doctor or clinic.        Dose / Directions Last dose taken    amLODIPine 5 MG tablet   Commonly known as:  NORVASC   Quantity:  30 tablet        TAKE 1 TABLET BY MOUTH DAILY WITH DINNER   Refills:  10        ASPIRIN PO   Dose:  81 mg        Take 81 mg by mouth daily   Refills:  0        B-12 1000 MCG Caps   Dose:  1000 mcg   Quantity:  90 capsule        Take 1,000 mcg by mouth daily   Refills:  3        doxazosin 8 MG tablet   Commonly known as:  CARDURA   Dose:  8 mg   Quantity:  90 tablet        Take 1 tablet (8 mg) by mouth daily   Refills:  3        folic acid 1 MG tablet   Commonly known as:  FOLVITE   Dose:  1 mg   Quantity:  90 tablet        Take 1 tablet (1 mg) by mouth daily   Refills:  3        * warfarin 2.5 MG tablet   Commonly known as:  COUMADIN   Quantity:  110 tablet        TAKE 1/2 TABLET (1.25MG) ON WEDNESDAYS AND 1 TABLET (2.5MG) ON ALL OTHER DAYS OR AS DIRECTED BY THE WARFARIN CLINIC   Refills:  3        * warfarin 2.5 MG tablet   Commonly known as:  COUMADIN   Quantity:  100 tablet        2.5mg (1 pill) daily or as directed by warfarin clinic   Refills:  3        * Notice:  This list has 2 medication(s) that are the same as other medications prescribed for you. Read the directions carefully, and ask your doctor or other care provider to review them with you.            Prescriptions were sent or printed at these locations (2 Prescriptions)                    [facility] [place] - [address]   [address] [place] [address]    Telephone:  [phone]   Fax:  [phone]   Hours:                  E-Prescribed (2 of 2)         docusate sodium (COLACE) 100 MG tablet               omeprazole (PRILOSEC) 40 MG capsule                Procedures and tests performed during your visit     CBC with platelets differential    Cardiac Continuous Monitoring    Comprehensive metabolic panel    INR    Occult blood fecal HGB immuno      Orders Needing Specimen Collection     None      Pending Results     No orders found from 7/8/2018 to 7/11/2018.            Pending Culture Results     No orders found from 7/8/2018 to 7/11/2018.            Thank you for choosing [facility]       Thank you for choosing [facility] for your care. Our goal is always to provide you with excellent care. Hearing back from our patients is one way we can continue to improve our services. Please take a few minutes to complete the written survey that you may receive in the mail after you visit with us. Thank you!        Care EveryWhere ID     This is your Care EveryWhere ID. This could be used by other organizations to access your [facility] medical records  [id number]        Equal Access to Services     SUSAN XAVIER AH: Rosales Grace, mary cabezas, delia stewart. So [facility] [phone].    ATENCIÓN: Si habla español, tiene a quiroz disposición servicios gratuitos de asistencia lingüística. Llame al [phone].    We comply with applicable federal civil rights laws and [place] laws. We do not discriminate on the basis of race, color, national origin, age, disability, sex, sexual orientation, or gender identity.            After Visit Summary       This is your record. Keep this with you and show to your community pharmacist(s) and doctor(s) at your next visit.

## 2018-07-10 NOTE — PROGRESS NOTES
LOC: alert and oriented X3; very pleasant and conversational; he and his wife talked about the gradual memory loss he has been experiencing and were encouraged to continue to talk with his PCP to keep him updated as this progresses. At this point they say he remember most daily and personal history events very well; he currently forgets things like the directions to places he is trying to drive to.   Others present: Patient and his wife Queta    Dx: rectal bleeding    Lives with: his wife Queta  Living at:  Home in [place]  Transportation: YES He and Queta both drive    Support System:  Their children live in the [facility]  Homecare//County Services:   None at this time, they do not feel this is needed. They were provided a Senior Linkage Line booklet should future needs arise  [place]: YES      [facility] Referral line called: yes    Primary PCP: JABIER [place]  Health Care Directive: NO and they do not want the information    Pharmacy: [facility]  Meds and appointments management: YES her is able to take them as instructed, knows what they are for and refills them before they run out.    Adequate Resources for needs (housing, utilities, food/med): YES  Household chores: independent, together with Queta  Work/community/social activity: YES as desired    ADLs: independent including buttons/snaps, socks/shoes etc.  Ambulation:independent, no device  Falls: none    Mental health: denies concern other than memory  Substance abuse: drinks 0.5 glass of wine daily, no other use  Exposure to violence/abuse: denies  Stressors: none additional     Able to Return to Prior Living Arrangements: YES    Goals: O[place] and Queta expect he will return home after discharge with no new needs    Barriers: none known    HEATHER: none

## 2018-07-10 NOTE — PROGRESS NOTES
ANTICOAGULATION FOLLOW-UP CLINIC VISIT    Patient Name:  Mary Irizarry  Date:  7/10/2018  Contact Type:  Face to Face    SUBJECTIVE:     Patient Findings     Positives Other complaints    Comments INR done by warfarin clinic nurse. He states he has had black stool x 2 days. This AM toilet water tinged pink also. He has no dizziness, no nausea, no vomiting  He is not currently taking any iron preparations. Patient given warfarin dosing instruction and encouraged to go to ER. His wife states they were there last night but left without being seen after waiting over 1 hour. I told them that it is necessary that they are seen today as it is unknown why he is having black stool and if he is bleeding somewhere in his upper intestine. They state they will go to ER when the leave this appt.            OBJECTIVE    INR Protime   Date Value Ref Range Status   07/10/2018 3.6 (A) 0.86 - 1.14 Final       ASSESSMENT / PLAN  INR assessment SUPRA    Recheck INR In: 10 DAYS    INR Location Clinic      Anticoagulation Summary as of 7/10/2018     INR goal 2.0-3.0   Today's INR 3.6!   Warfarin maintenance plan 2.5 mg (2.5 mg x 1) every day   Full warfarin instructions 7/10: Hold; Otherwise 2.5 mg every day   Weekly warfarin total 17.5 mg   Plan last modified Mayrlou Joshi RN (5/11/2018)   Next INR check 7/19/2018   Target end date     Indications   Pulmonary embolism (H) [I26.99]  Long-term (current) use of anticoagulants [Z79.01] [Z79.01]         Anticoagulation Episode Summary     INR check location     Preferred lab     Send INR reminders to  LIANG POOL    Comments       Anticoagulation Care Providers     Provider Role Specialty Phone number    JABIER [place] MD [facility] [phone]            See the Encounter Report to view Anticoagulation Flowsheet and Dosing Calendar (Go to Encounters tab in chart review, and find the Anticoagulation Therapy Visit)        Marylou Joshi, RN

## 2018-07-10 NOTE — DISCHARGE INSTRUCTIONS
Upper GI Bleeding (Stable)  Your upper gastrointestinal (GI) tract includes your esophagus, stomach, and upper small intestine. You have signs of bleeding from your upper GI tract. You may have vomited or coughed up blood or coffee-ground like material. Or you may have black or tarry stools. Very small amounts of GI bleeding may not be visible and can only be found by a test of the stool.  Causes of upper GI bleeding can include:    Tear in the lining of the esophagus    Enlarged veins in the esophagus    An ulcer in the stomach or top of the small intestine    Severe irritation of the stomach    Inflammation of the digestive tract  A bloody nose or mouth or dental problems may cause blood to be swallowed and vomited up again. This is not true GI bleeding. Iron supplements and medicines for diarrhea and upset stomach can cause black stools. This is not GI bleeding and is not a cause for concern.  Home care  Depending on the cause of your bleeding, care may include the following:    You may be given medicines to help protect your GI tract, treat your problem, and promote healing. Take these as directed.    Don't take NSAIDs, such as aspirin, ibuprofen, or naproxen. They can irritate the stomach and cause further bleeding. If you are taking these medicines for other medical reasons, talk to your healthcare provider before you stop them.      Don't use alcohol, caffeine, or tobacco. These can delay healing and make your problem worse.  Follow-up care  Follow up with your healthcare provider, or as advised. Further tests may need to be done to find the cause of your bleeding.  When to seek medical advice  Call your healthcare provider right away for any of the following:    Stomach pain appears or gets worse    Pain spreads to the neck, back, shoulder, or arm    Weakness or dizziness    Swelling of your abdomen    Red blood in your stool    Fever of 100.4 F (38 C) or higher, or as directed by your healthcare  provider  Call 911  Call 911 if any of these occur:    Trouble breathing or swallowing    Severe dizziness    Loss of consciousness    Vomiting blood or large amounts of blood in the stool  Date Last Reviewed: 6/24/2015 2000-2017 [employer]. [address] [place] [address], [place] [address]. All rights reserved. This information is not intended as a substitute for professional medical care. Always follow your healthcare professional's instructions.

## 2018-07-10 NOTE — ED NOTES
Presents to emergency room from coumadin clinic with c/o black stools x 2 days and today noticed red blood streaks in the stool.   Denies dizziness or lightheadedness. Denies abdominal pain. Denies N/V/D. 1 BM a day.   Bilateral lower extremity edema. Hx PE and DVT. HR 70's irregular on monitor. IV established.   Abdomen soft and non tender. Wife at bedside. Call light within reach.

## 2018-07-10 NOTE — MR AVS SNAPSHOT
Mary Pageoneedgar   7/10/2018 8:15 AM   Anticoagulation Therapy Visit    Description:  85 year old male   Provider:   ANTI COAGULATION   Department:  [facility] Anti Coagulation           INR as of 7/10/2018     Today's INR 3.6!      Anticoagulation Summary as of 7/10/2018     INR goal 2.0-3.0   Today's INR 3.6!   Full warfarin instructions 7/10: Hold; Otherwise 2.5 mg every day   Next INR check 7/19/2018    Indications   Pulmonary embolism (H) [I26.99]  Long-term (current) use of anticoagulants [Z79.01] [Z79.01]         Your next Anticoagulation Clinic appointment(s)     Jul 19, 2018  8:15 AM CDT   Anticoagulation Visit with  ANTI COAGULATION   [facility] [place] ([facility] )    [address]  [place] [address]   [phone]              July 2018 Details    Sun Mon Tue Wed Thu Fri Sat     1               2               3               4               5               6               7                 8               9               10      Hold   See details      11      2.5 mg         12      2.5 mg         13      2.5 mg         14      2.5 mg           15      2.5 mg         16      2.5 mg         17      2.5 mg         18      2.5 mg         19            20               21                 22               23               24               25               26               27               28                 29               30               31                    Date Details   07/10 This INR check       Date of next INR:  7/19/2018         How to take your warfarin dose     To take:  2.5 mg Take 1 of the 2.5 mg tablets.    Hold Do not take your warfarin dose. See the Details table to the right for additional instructions.

## 2018-07-10 NOTE — ED NOTES
Discharge instructions reviewed with patient and wife, and both verbalized understanding. Rx x2 sent to [facility]. Pt ambulated with a steady gait to the exit.

## 2018-07-10 NOTE — ED PROVIDER NOTES
History     Chief Complaint   Patient presents with     Rectal Bleeding     black stools for a couple of days, some bright red blood today.  coumadin clinic sent him here     HPI  Mary Irizarry is a 85 year old male who presents the emergency room with black stools been going on for about 48 hours.  Today he had some bright red blood, which was new.  He went to Coumadin clinic for evaluation, his INR was 2.92 and he was sent to the emergency room because the bleeding.  On arrival here he was having no active bleeding, has had 1-2 stools per day since it started and has had no major bleeding.  He has no bleeding or else on his body either.  He feels well and is wondering why he is here.    Problem List:    Patient Active Problem List    Diagnosis Date Noted     Long-term (current) use of anticoagulants [Z79.01] 01/17/2018     Priority: Medium     Acute respiratory failure with hypoxia (H) 01/12/2018     Priority: Medium     Pulmonary embolism (H) 01/11/2018     Priority: Medium     Hypoxia 01/11/2018     Priority: Medium     Elevated troponin I level 01/11/2018     Priority: Medium     Pulmonary embolism, bilateral (H) 01/11/2018     Priority: Medium     Lesion of left pinna 09/11/2017     Priority: Medium     Essential hypertension, benign 09/11/2017     Priority: Medium     ACP (advance care planning) 08/16/2016     Priority: Medium     Advance Care Planning 8/16/2016: ACP Review of Chart / Resources Provided:  Reviewed chart for advance care plan.  Mary Irizarry has no plan or code status on file. Discussed available resources and declined information.Carlie Alicea             Facial lesion 08/28/2015     Priority: Medium     Left inguinal hernia 08/28/2015     Priority: Medium        Past Medical History:    Past Medical History:   Diagnosis Date     Hypertension      Pulmonary embolism (H)        Past Surgical History:    Past Surgical History:   Procedure Laterality Date     GENITOURINARY SURGERY       prostatectomy       Family History:    Family History   Problem Relation Age of Onset     Hypertension Mother      Unknown/Adopted Father      No Known Problems Maternal Grandmother      No Known Problems Maternal Grandfather      No Known Problems Paternal Grandmother      No Known Problems Paternal Grandfather      No Known Problems Sister      No Known Problems Son      No Known Problems Son      Deep Vein Thrombosis (DVT) Son        Social History:  Marital Status:   [2]  Social History   Substance Use Topics     Smoking status: Former Smoker     Smokeless tobacco: Never Used      Comment: 2 mos while in high school.      Alcohol use 0.0 oz/week     0 Standard drinks or equivalent per week      Comment: occa        Medications:      amLODIPine (NORVASC) 5 MG tablet   ASPIRIN PO   Cyanocobalamin (B-12) 1000 MCG CAPS   docusate sodium (COLACE) 100 MG tablet   doxazosin (CARDURA) 8 MG tablet   folic acid (FOLVITE) 1 MG tablet   omeprazole (PRILOSEC) 40 MG capsule   warfarin (COUMADIN) 2.5 MG tablet   warfarin (COUMADIN) 2.5 MG tablet         Review of Systems   Constitutional: Positive for activity change. Negative for chills, diaphoresis and fatigue.   HENT: Negative.    Respiratory: Negative for shortness of breath.    Cardiovascular: Negative for chest pain.   Gastrointestinal: Positive for blood in stool and constipation. Negative for diarrhea, nausea and vomiting.   Genitourinary: Negative for dysuria and hematuria.   Musculoskeletal: Positive for arthralgias.        Right arm   Skin: Negative.    Neurological: Negative for weakness and headaches.   Psychiatric/Behavioral: Negative.        Physical Exam   BP: 144/81  Pulse: 85  Heart Rate: 63  Temp: 97.7  F (36.5  C)  Resp: 16  SpO2: 97 %      Physical Exam   Constitutional: He is oriented to person, place, and time. He appears well-developed and well-nourished.   HENT:   Head: Normocephalic and atraumatic.   Neck: Normal range of motion. Neck supple.    Cardiovascular: Normal rate, regular rhythm, normal heart sounds and intact distal pulses.    No murmur heard.  Pulmonary/Chest: Effort normal and breath sounds normal. No respiratory distress.   Abdominal: Soft. Bowel sounds are normal. He exhibits no distension. There is no tenderness.   Genitourinary: Rectal exam shows guaiac positive stool. Rectal exam shows no external hemorrhoid, no internal hemorrhoid, no mass and no tenderness.   Musculoskeletal: Normal range of motion.   Neurological: He is alert and oriented to person, place, and time.   Skin: Skin is warm and dry.   Psychiatric: He has a normal mood and affect.   Nursing note and vitals reviewed.      ED Course     ED Course     Procedures    Results for orders placed or performed during the hospital encounter of 07/10/18 (from the past 24 hour(s))   CBC with platelets differential   Result Value Ref Range    WBC 4.9 4.0 - 11.0 10e9/L    RBC Count 3.78 (L) 4.4 - 5.9 10e12/L    Hemoglobin 10.9 (L) 13.3 - 17.7 g/dL    Hematocrit 33.3 (L) 40.0 - 53.0 %    MCV 88 78 - 100 fl    MCH 28.8 26.5 - 33.0 pg    MCHC 32.7 31.5 - 36.5 g/dL    RDW 13.7 10.0 - 15.0 %    Platelet Count 172 150 - 450 10e9/L    Diff Method Automated Method     % Neutrophils 58.4 %    % Lymphocytes 32.4 %    % Monocytes 7.2 %    % Eosinophils 1.2 %    % Basophils 0.4 %    % Immature Granulocytes 0.4 %    Nucleated RBCs 0 0 /100    Absolute Neutrophil 2.8 1.6 - 8.3 10e9/L    Absolute Lymphocytes 1.6 0.8 - 5.3 10e9/L    Absolute Monocytes 0.4 0.0 - 1.3 10e9/L    Absolute Eosinophils 0.1 0.0 - 0.7 10e9/L    Absolute Basophils 0.0 0.0 - 0.2 10e9/L    Abs Immature Granulocytes 0.0 0 - 0.4 10e9/L    Absolute Nucleated RBC 0.0    Comprehensive metabolic panel   Result Value Ref Range    Sodium 145 (H) 133 - 144 mmol/L    Potassium 3.6 3.4 - 5.3 mmol/L    Chloride 112 (H) 94 - 109 mmol/L    Carbon Dioxide 26 20 - 32 mmol/L    Anion Gap 7 3 - 14 mmol/L    Glucose 105 (H) 70 - 99 mg/dL    Urea  Nitrogen 19 7 - 30 mg/dL    Creatinine 0.88 0.66 - 1.25 mg/dL    GFR Estimate 82 >60 mL/min/1.7m2    GFR Estimate If Black >90 >60 mL/min/1.7m2    Calcium 8.2 (L) 8.5 - 10.1 mg/dL    Bilirubin Total 0.5 0.2 - 1.3 mg/dL    Albumin 3.2 (L) 3.4 - 5.0 g/dL    Protein Total 6.5 (L) 6.8 - 8.8 g/dL    Alkaline Phosphatase 54 40 - 150 U/L    ALT 31 0 - 70 U/L    AST 21 0 - 45 U/L   INR   Result Value Ref Range    INR 2.92 (H) 0.80 - 1.20   Occult blood fecal HGB immuno   Result Value Ref Range    Occult Blood HGB FIT Positive (A) NEG^Negative       Medications   0.9% sodium chloride BOLUS (not administered)     Followed by   sodium chloride 0.9% infusion (not administered)   pantoprazole (PROTONIX) 40 mg IV push injection (40 mg Intravenous Given 7/10/18 0931)       Assessments & Plan (with Medical Decision Making)   Patient has no evidence of hemodynamic instability, is still having black stools that are guaiac positive.  Hemoglobin is 10.9, INR is 2.92.  Spoke with Dr. Tesfaye who recommended that the patient be brought into surgery clinic for an appointment in the next week at which point they can discuss whether he needs to be scoped.  Patient discharged home with his wife.    I have reviewed the nursing notes.    I have reviewed the findings, diagnosis, plan and need for follow up with the patient.  Discharge Medication List as of 7/10/2018 11:39 AM      START taking these medications    Details   docusate sodium (COLACE) 100 MG tablet Take 200 mg by mouth daily, Disp-60 tablet, R-1, E-Prescribe      omeprazole (PRILOSEC) 40 MG capsule Take 1 capsule (40 mg) by mouth daily, Disp-30 capsule, R-0, E-Prescribe             Final diagnoses:   Gastrointestinal hemorrhage, unspecified gastrointestinal hemorrhage type       7/10/2018   [facility]     Marion Alexis MD  07/10/18 1044

## 2018-07-11 NOTE — PROGRESS NOTES
[facility] says he is a non-service connected vet and his other insurance should be billed. [facility].

## 2018-07-19 ENCOUNTER — ANTICOAGULATION THERAPY VISIT (OUTPATIENT)
Dept: ANTICOAGULATION | Facility: OTHER | Age: 83
End: 2018-07-19
Attending: FAMILY MEDICINE
Payer: MEDICARE

## 2018-07-19 DIAGNOSIS — Z79.01 LONG-TERM (CURRENT) USE OF ANTICOAGULANTS: ICD-10-CM

## 2018-07-19 LAB — INR POINT OF CARE: 3.2 (ref 0.86–1.14)

## 2018-07-19 PROCEDURE — 85610 PROTHROMBIN TIME: CPT | Mod: QW,ZL

## 2018-07-19 NOTE — PROGRESS NOTES
ANTICOAGULATION FOLLOW-UP CLINIC VISIT    Patient Name:  Mary Irizarry  Date:  7/19/2018  Contact Type:  Face to Face    SUBJECTIVE:     Patient Findings     Comments States black stools have subsided.           OBJECTIVE    INR Protime   Date Value Ref Range Status   07/19/2018 3.2 (A) 0.86 - 1.14 Final       ASSESSMENT / PLAN  INR assessment SUPRA    Recheck INR In: 2 WEEKS    INR Location Clinic      Anticoagulation Summary as of 7/19/2018     INR goal 2.0-3.0   Today's INR 3.2!   Warfarin maintenance plan 2.5 mg (2.5 mg x 1) every day   Full warfarin instructions 7/19: 1.25 mg; Otherwise 2.5 mg every day   Weekly warfarin total 17.5 mg   Plan last modified Marylou Joshi RN (5/11/2018)   Next INR check 8/2/2018   Target end date     Indications   Pulmonary embolism (H) [I26.99]  Long-term (current) use of anticoagulants [Z79.01] [Z79.01]         Anticoagulation Episode Summary     INR check location     Preferred lab     Send INR reminders to  [facility] POOL    Comments       Anticoagulation Care Providers     Provider Role Specialty Phone number    JABIER Kent MD [facility] [phone]            See the Encounter Report to view Anticoagulation Flowsheet and Dosing Calendar (Go to Encounters tab in chart review, and find the Anticoagulation Therapy Visit)        Marylou Joshi, RN

## 2018-07-19 NOTE — MR AVS SNAPSHOT
Mary Irizarry   7/19/2018 8:15 AM   Anticoagulation Therapy Visit    Description:  85 year old male   Provider:   ANTI COAGULATION   Department:  [facility] Anti Coagulation           INR as of 7/19/2018     Today's INR 3.2!      Anticoagulation Summary as of 7/19/2018     INR goal 2.0-3.0   Today's INR 3.2!   Full warfarin instructions 7/19: 1.25 mg; Otherwise 2.5 mg every day   Next INR check 8/2/2018    Indications   Pulmonary embolism (H) [I26.99]  Long-term (current) use of anticoagulants [Z79.01] [Z79.01]         Your next Anticoagulation Clinic appointment(s)     Aug 02, 2018  8:00 AM CDT   Anticoagulation Visit with  ANTI COAGULATION   [facility] ([facility] [place] )    [address] [place] [address]   [phone]              July 2018 Details    Sun Mon Tue Wed Thu Fri Sat     1               2               3               4               5               6               7                 8               9               10               11               12               13               14                 15               16               17               18               19      1.25 mg   See details      20      2.5 mg         21      2.5 mg           22      2.5 mg         23      2.5 mg         24      2.5 mg         25      2.5 mg         26      2.5 mg         27      2.5 mg         28      2.5 mg           29      2.5 mg         30      2.5 mg         31      2.5 mg              Date Details   07/19 This INR check               How to take your warfarin dose     To take:  1.25 mg Take 0.5 of a 2.5 mg tablet.    To take:  2.5 mg Take 1 of the 2.5 mg tablets.           August 2018 Details    Sun Mon Tue Wed Thu Fri Sat        1      2.5 mg         2            3               4                 5               6               7               8               9               10               11                 12               13               14               15                16               17               18                 19               20               21               22               23               24               25                 26               27               28               29               30               31                 Date Details   No additional details    Date of next INR:  8/2/2018         How to take your warfarin dose     To take:  2.5 mg Take 1 of the 2.5 mg tablets.

## 2018-07-20 ENCOUNTER — OFFICE VISIT (OUTPATIENT)
Dept: SURGERY | Facility: OTHER | Age: 83
End: 2018-07-20
Attending: SURGERY
Payer: COMMERCIAL

## 2018-07-20 VITALS
HEART RATE: 90 BPM | OXYGEN SATURATION: 97 % | SYSTOLIC BLOOD PRESSURE: 110 MMHG | HEIGHT: 67 IN | TEMPERATURE: 96.3 F | BODY MASS INDEX: 26.06 KG/M2 | DIASTOLIC BLOOD PRESSURE: 60 MMHG | WEIGHT: 166 LBS

## 2018-07-20 DIAGNOSIS — Z87.19 HISTORY OF MELENA: Primary | ICD-10-CM

## 2018-07-20 PROCEDURE — G0463 HOSPITAL OUTPT CLINIC VISIT: HCPCS

## 2018-07-20 PROCEDURE — 99203 OFFICE O/P NEW LOW 30 MIN: CPT | Performed by: SURGERY

## 2018-07-20 ASSESSMENT — PAIN SCALES - GENERAL: PAINLEVEL: NO PAIN (0)

## 2018-07-20 NOTE — PATIENT INSTRUCTIONS
Thank you for allowing Dr. Ko and our surgical team to participate in your care. Please call with any scheduling questions or concerns to Ce at [phone]   [facility] [phone]

## 2018-07-20 NOTE — PROGRESS NOTES
[facility] Surgery Consultation    CC:  Hx of melena     HPI:  This 85 year old year old male is seen at the request of Katherine Soto for evaluation of history of melena. He was seen on 7/10 with concerns for dark stools. He has never had this issue before. He had some gas pains at the time illicits no pain currently. He has not had any dark stools recently. He has been taking Prilosec since discharge from ED and feels it is helping. [place]. He is quite active still. He has had his prostate removed. He denies dizziness, shortness of breath, nausea or vomiting.     Past Medical History:   Diagnosis Date     Hypertension      Pulmonary embolism (H)        Past Surgical History:   Procedure Laterality Date     GENITOURINARY SURGERY      prostatectomy       No Known Allergies    Current Outpatient Prescriptions   Medication     amLODIPine (NORVASC) 5 MG tablet     ASPIRIN PO     Cyanocobalamin (B-12) 1000 MCG CAPS     docusate sodium (COLACE) 100 MG tablet     doxazosin (CARDURA) 8 MG tablet     folic acid (FOLVITE) 1 MG tablet     omeprazole (PRILOSEC) 40 MG capsule     warfarin (COUMADIN) 2.5 MG tablet     warfarin (COUMADIN) 2.5 MG tablet     No current facility-administered medications for this visit.        HABITS:    Social History   Substance Use Topics     Smoking status: Former Smoker     Smokeless tobacco: Never Used      Comment: 2 mos while in high school.      Alcohol use 0.0 oz/week     0 Standard drinks or equivalent per week      Comment: occa       Family History   Problem Relation Age of Onset     Hypertension Mother      Unknown/Adopted Father      No Known Problems Maternal Grandmother      No Known Problems Maternal Grandfather      No Known Problems Paternal Grandmother      No Known Problems Paternal Grandfather      No Known Problems Sister      No Known Problems Son      No Known Problems Son      Deep Vein Thrombosis (DVT) Son        REVIEW OF SYSTEMS:  Ten point  "review of systems negative except those mentioned in the HPI.     OBJECTIVE:    /60  Pulse 90  Temp 96.3  F (35.7  C) (Tympanic)  Ht 5' 7\" (1.702 m)  Wt 166 lb (75.3 kg)  SpO2 97%  BMI 26 kg/m2    GENERAL: Generally appears well, in no distress with appropriate affect.  HEENT:   Sclerae anicteric - normocephalic atraumatic   Respiratory:  No acute distress, no splinting   Cardiovascular:  Regular Rate, occasional ectopic beat.   Abdomen: soft non-tender, non distended   :  deferred  Extremities:  Extremities normal. No deformities, edema, or skin discoloration.  Skin:  no suspicious lesions or rashes  Neurological: grossly intact  Psych:  Alert, oriented, affect appropriate with normal decision making ability.    IMPRESSION:    85 y.o. 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 Since the episode he has not had any abdominal pain, his stools have returned back to normal color and denies any symptoms of anemia. Discussed with patient that it is difficult to know how far to work this up, he has seen Dr. Peters and has a genetic predisposition for blood clots though has not manifested until he is 85, so it appears there is no plan to stop his coumadin. Discussed that he should stop is baby aspirin. I discussed that as he is asymptomatic I feel that we can hold on pan endoscopy for now. I would like him to follow up with PCP though to discuss as well as discuss risk benefit of remaining on coumadin (>6 months out from unprovoked PE) though realize there is no easy answer for this one.     PLAN:    Follow up PCP  No plan for endoscopy at current time .     Aquiles Ko MD,     7/20/2018  12:46 PM        "

## 2018-07-20 NOTE — NURSING NOTE
"Chief Complaint   Patient presents with     Consult     ER Follow up Gastrointestial hemmorhage       Initial /60  Pulse 90  Temp 96.3  F (35.7  C) (Tympanic)  Ht 5' 7\" (1.702 m)  Wt 166 lb (75.3 kg)  SpO2 97%  BMI 26 kg/m2 Estimated body mass index is 26 kg/(m^2) as calculated from the following:    Height as of this encounter: 5' 7\" (1.702 m).    Weight as of this encounter: 166 lb (75.3 kg).  Medication Reconciliation: complete    Daysi Concepcion LPN    "

## 2018-07-20 NOTE — MR AVS SNAPSHOT
After Visit Summary   7/20/2018    Mary Irizarry    MRN: [id number]           Patient Information     Date Of Birth          8/27/1932        Visit Information        Provider Department      7/20/2018 11:00 AM Aquiles Ko MD [facility]        Care Instructions    Thank you for allowing Dr. Ko and our surgical team to participate in your care. Please call with any scheduling questions or concerns to [facility] at [phone]   [facility] [phone]            Follow-ups after your visit        Your next 10 appointments already scheduled     Aug 02, 2018  8:00 AM CDT   Anticoagulation Visit with HC ANTI COAGULATION   [facility] ([facility] [place] )    [address]  [place] [address]   [phone]            Oct 08, 2018  3:00 PM CDT   LAB with HC LAB   [facility] ([facility] )    [address]  [place] [address]   [phone]            Oct 15, 2018  3:00 PM CDT   (Arrive by 2:45 PM)   Return Visit with Don Geiger MD   [facility] ([facility] )    [address]  [place] [address]   [phone]              Who to contact     If you have questions or need follow up information about today's clinic visit or your schedule please contact [facility] directly at [phone].  Normal or non-critical lab and imaging results will be communicated to you by MyChart, letter or phone within 4 business days after the clinic has received the results. If you do not hear from us within 7 days, please contact the clinic through MyChart or phone. If you have a critical or abnormal lab result, we will notify you by phone as soon as possible.  Submit refill requests through [employer] or call your pharmacy and they will forward the refill request to us. Please allow 3 business days for your refill to be completed.          Additional Information About Your Visit        Care  "EveryWhere ID     This is your Care EveryWhere ID. This could be used by other organizations to access your [facility] medical records  [id number]        Your Vitals Were     Pulse Temperature Height Pulse Oximetry BMI (Body Mass Index)       90 96.3  F (35.7  C) (Tympanic) 5' 7\" (1.702 m) 97% 26 kg/m2        Blood Pressure from Last 3 Encounters:   07/20/18 110/60   07/10/18 139/86   07/09/18 106/82    Weight from Last 3 Encounters:   07/20/18 166 lb (75.3 kg)   04/17/18 162 lb 4.8 oz (73.6 kg)   04/09/18 160 lb 12.8 oz (72.9 kg)              Today, you had the following     No orders found for display       Primary Care Provider Office Phone # Fax #    R Uriel Kent [phone]       [facility] [address] [place] [address]        Equal Access to Services     [facility] Hospital[facility] AH: Hadii aad ku hadasho Soomaali, waaxda luqadaha, qaybta kaalmada adeegyada, waxay carmenin haymannyn emeterio perera . So [facility] [phone].    ATENCIÓN: Si bethla ashlee, tiene a quiroz disposición servicios gratuitos de asistencia lingüística. Llame al [phone].    We comply with applicable federal civil rights laws and [place] laws. We do not discriminate on the basis of race, color, national origin, age, disability, sex, sexual orientation, or gender identity.            Thank you!     Thank you for choosing [facility]  for your care. Our goal is always to provide you with excellent care. Hearing back from our patients is one way we can continue to improve our services. Please take a few minutes to complete the written survey that you may receive in the mail after your visit with us. Thank you!             Your Updated Medication List - Protect others around you: Learn how to safely use, store and throw away your medicines at www.disposemymeds.org.          This list is accurate as of 7/20/18 11:10 AM.  Always use your most recent med list.                   Brand Name Dispense Instructions for use Diagnosis "    amLODIPine 5 MG tablet    NORVASC    30 tablet    TAKE 1 TABLET BY MOUTH DAILY WITH DINNER    Essential hypertension, benign       ASPIRIN PO      Take 81 mg by mouth daily        B-12 1000 MCG Caps     90 capsule    Take 1,000 mcg by mouth daily    Primary hypercoagulable state (H)       docusate sodium 100 MG tablet    COLACE    60 tablet    Take 200 mg by mouth daily        doxazosin 8 MG tablet    CARDURA    90 tablet    Take 1 tablet (8 mg) by mouth daily    Essential hypertension, benign       folic acid 1 MG tablet    FOLVITE    90 tablet    Take 1 tablet (1 mg) by mouth daily    Primary hypercoagulable state (H)       omeprazole 40 MG capsule    priLOSEC    30 capsule    Take 1 capsule (40 mg) by mouth daily        * warfarin 2.5 MG tablet    COUMADIN    110 tablet    TAKE 1/2 TABLET (1.25MG) ON WEDNESDAYS AND 1 TABLET (2.5MG) ON ALL OTHER DAYS OR AS DIRECTED BY THE WARFARIN CLINIC    Long-term (current) use of anticoagulants, Pulmonary embolus (H)       * warfarin 2.5 MG tablet    COUMADIN    100 tablet    2.5mg (1 pill) daily or as directed by warfarin clinic    Long-term (current) use of anticoagulants, History of pulmonary embolism       * Notice:  This list has 2 medication(s) that are the same as other medications prescribed for you. Read the directions carefully, and ask your doctor or other care provider to review them with you.

## 2018-08-02 ENCOUNTER — ANTICOAGULATION THERAPY VISIT (OUTPATIENT)
Dept: ANTICOAGULATION | Facility: OTHER | Age: 83
End: 2018-08-02
Attending: FAMILY MEDICINE
Payer: MEDICARE

## 2018-08-02 DIAGNOSIS — Z79.01 LONG-TERM (CURRENT) USE OF ANTICOAGULANTS: ICD-10-CM

## 2018-08-02 LAB — INR POINT OF CARE: 3.4 (ref 0.86–1.14)

## 2018-08-02 PROCEDURE — 36416 COLLJ CAPILLARY BLOOD SPEC: CPT | Mod: ZL

## 2018-08-02 NOTE — PROGRESS NOTES
ANTICOAGULATION FOLLOW-UP CLINIC VISIT    Patient Name:  Mary Irizarry  Date:  8/2/2018  Contact Type:  Face to Face    SUBJECTIVE:     Patient Findings     Positives Activity level change    Comments Denies increase in vit K intake, states some decrease in activity. No bleeding/bruising.           OBJECTIVE    INR Protime   Date Value Ref Range Status   08/02/2018 3.4 (A) 0.86 - 1.14 Final       ASSESSMENT / PLAN  INR assessment SUPRA    Recheck INR In: 3 WEEKS    INR Location Clinic      Anticoagulation Summary as of 8/2/2018     INR goal 2.0-3.0   Today's INR 3.4!   Warfarin maintenance plan 1.25 mg (2.5 mg x 0.5) on Wed; 2.5 mg (2.5 mg x 1) all other days   Full warfarin instructions 8/2: Hold; Otherwise 1.25 mg on Wed; 2.5 mg all other days   Weekly warfarin total 16.25 mg   Plan last modified Marylou Joshi RN (8/2/2018)   Next INR check 8/23/2018   Target end date     Indications   Pulmonary embolism (H) [I26.99]  Long-term (current) use of anticoagulants [Z79.01] [Z79.01]         Anticoagulation Episode Summary     INR check location     Preferred lab     Send INR reminders to [facility] POOL    Comments       Anticoagulation Care Providers     Provider Role Specialty Phone number    JABIER Kent MD [facility] [phone]            See the Encounter Report to view Anticoagulation Flowsheet and Dosing Calendar (Go to Encounters tab in chart review, and find the Anticoagulation Therapy Visit)        Marylou Joshi RN

## 2018-08-02 NOTE — MR AVS SNAPSHOT
Mary BIANCA Harleyedgar   8/2/2018 8:00 AM   Anticoagulation Therapy Visit    Description:  85 year old male   Provider:   ANTI COAGULATION   Department:  [facility] Anti Coagulation           INR as of 8/2/2018     Today's INR 3.4!      Anticoagulation Summary as of 8/2/2018     INR goal 2.0-3.0   Today's INR 3.4!   Full warfarin instructions 8/2: Hold; Otherwise 1.25 mg on Wed; 2.5 mg all other days   Next INR check 8/23/2018    Indications   Pulmonary embolism (H) [I26.99]  Long-term (current) use of anticoagulants [Z79.01] [Z79.01]         Your next Anticoagulation Clinic appointment(s)     Aug 23, 2018  8:00 AM CDT   Anticoagulation Visit with  ANTI COAGULATION   [facility] ([facility] )    [address] [place] [address]   [phone]              August 2018 Details    Sun Mon Tue Wed Thu Fri Sat        1               2      Hold   See details      3      2.5 mg         4      2.5 mg           5      2.5 mg         6      2.5 mg         7      2.5 mg         8      1.25 mg         9      2.5 mg         10      2.5 mg         11      2.5 mg           12      2.5 mg         13      2.5 mg         14      2.5 mg         15      1.25 mg         16      2.5 mg         17      2.5 mg         18      2.5 mg           19      2.5 mg         20      2.5 mg         21      2.5 mg         22      1.25 mg         23            24               25                 26               27               28               29               30               31                 Date Details   08/02 This INR check       Date of next INR:  8/23/2018         How to take your warfarin dose     To take:  1.25 mg Take 0.5 of a 2.5 mg tablet.    To take:  2.5 mg Take 1 of the 2.5 mg tablets.    Hold Do not take your warfarin dose. See the Details table to the right for additional instructions.

## 2018-08-04 ENCOUNTER — HOSPITAL ENCOUNTER (EMERGENCY)
Facility: HOSPITAL | Age: 83
Discharge: HOME | End: 2018-08-04
Attending: PHYSICIAN ASSISTANT | Admitting: PHYSICIAN ASSISTANT
Payer: MEDICARE

## 2018-08-04 VITALS
TEMPERATURE: 97.7 F | HEART RATE: 108 BPM | RESPIRATION RATE: 16 BRPM | DIASTOLIC BLOOD PRESSURE: 88 MMHG | SYSTOLIC BLOOD PRESSURE: 166 MMHG | OXYGEN SATURATION: 95 %

## 2018-08-04 DIAGNOSIS — N50.89 SCROTAL EDEMA: ICD-10-CM

## 2018-08-04 DIAGNOSIS — R60.9 DEPENDENT EDEMA: ICD-10-CM

## 2018-08-04 LAB
ALBUMIN SERPL-MCNC: 3.4 G/DL (ref 3.4–5)
ALP SERPL-CCNC: 63 U/L (ref 40–150)
ALT SERPL W P-5'-P-CCNC: 31 U/L (ref 0–70)
ANION GAP SERPL CALCULATED.3IONS-SCNC: 7 MMOL/L (ref 3–14)
AST SERPL W P-5'-P-CCNC: 18 U/L (ref 0–45)
BASOPHILS # BLD AUTO: 0 10E9/L (ref 0–0.2)
BASOPHILS NFR BLD AUTO: 0.3 %
BILIRUB SERPL-MCNC: 0.5 MG/DL (ref 0.2–1.3)
BUN SERPL-MCNC: 20 MG/DL (ref 7–30)
CALCIUM SERPL-MCNC: 8.3 MG/DL (ref 8.5–10.1)
CHLORIDE SERPL-SCNC: 111 MMOL/L (ref 94–109)
CO2 SERPL-SCNC: 27 MMOL/L (ref 20–32)
CREAT SERPL-MCNC: 0.96 MG/DL (ref 0.66–1.25)
DIFFERENTIAL METHOD BLD: ABNORMAL
EOSINOPHIL # BLD AUTO: 0 10E9/L (ref 0–0.7)
EOSINOPHIL NFR BLD AUTO: 0.6 %
ERYTHROCYTE [DISTWIDTH] IN BLOOD BY AUTOMATED COUNT: 13.5 % (ref 10–15)
GFR SERPL CREATININE-BSD FRML MDRD: 75 ML/MIN/1.7M2
GLUCOSE SERPL-MCNC: 98 MG/DL (ref 70–99)
HCT VFR BLD AUTO: 35 % (ref 40–53)
HGB BLD-MCNC: 11.2 G/DL (ref 13.3–17.7)
IMM GRANULOCYTES # BLD: 0 10E9/L (ref 0–0.4)
IMM GRANULOCYTES NFR BLD: 0.2 %
INR PPP: 2 (ref 0.8–1.2)
LYMPHOCYTES # BLD AUTO: 1.2 10E9/L (ref 0.8–5.3)
LYMPHOCYTES NFR BLD AUTO: 18.9 %
MCH RBC QN AUTO: 27.6 PG (ref 26.5–33)
MCHC RBC AUTO-ENTMCNC: 32 G/DL (ref 31.5–36.5)
MCV RBC AUTO: 86 FL (ref 78–100)
MONOCYTES # BLD AUTO: 0.5 10E9/L (ref 0–1.3)
MONOCYTES NFR BLD AUTO: 7.4 %
NEUTROPHILS # BLD AUTO: 4.5 10E9/L (ref 1.6–8.3)
NEUTROPHILS NFR BLD AUTO: 72.6 %
NRBC # BLD AUTO: 0 10*3/UL
NRBC BLD AUTO-RTO: 0 /100
NT-PROBNP SERPL-MCNC: 1805 PG/ML (ref 0–1800)
PLATELET # BLD AUTO: 192 10E9/L (ref 150–450)
POTASSIUM SERPL-SCNC: 4.2 MMOL/L (ref 3.4–5.3)
PROT SERPL-MCNC: 7.2 G/DL (ref 6.8–8.8)
RBC # BLD AUTO: 4.06 10E12/L (ref 4.4–5.9)
SODIUM SERPL-SCNC: 145 MMOL/L (ref 133–144)
WBC # BLD AUTO: 6.2 10E9/L (ref 4–11)

## 2018-08-04 PROCEDURE — 83880 ASSAY OF NATRIURETIC PEPTIDE: CPT | Mod: GZ | Performed by: PHYSICIAN ASSISTANT

## 2018-08-04 PROCEDURE — 99284 EMERGENCY DEPT VISIT MOD MDM: CPT | Mod: 25

## 2018-08-04 PROCEDURE — 96374 THER/PROPH/DIAG INJ IV PUSH: CPT

## 2018-08-04 PROCEDURE — 85025 COMPLETE CBC W/AUTO DIFF WBC: CPT | Performed by: PHYSICIAN ASSISTANT

## 2018-08-04 PROCEDURE — 80053 COMPREHEN METABOLIC PANEL: CPT | Performed by: PHYSICIAN ASSISTANT

## 2018-08-04 PROCEDURE — 99284 EMERGENCY DEPT VISIT MOD MDM: CPT | Performed by: PHYSICIAN ASSISTANT

## 2018-08-04 PROCEDURE — 36415 COLL VENOUS BLD VENIPUNCTURE: CPT | Performed by: PHYSICIAN ASSISTANT

## 2018-08-04 PROCEDURE — 25000128 H RX IP 250 OP 636: Performed by: PHYSICIAN ASSISTANT

## 2018-08-04 PROCEDURE — 85610 PROTHROMBIN TIME: CPT | Performed by: PHYSICIAN ASSISTANT

## 2018-08-04 RX ORDER — FUROSEMIDE 10 MG/ML
40 INJECTION INTRAMUSCULAR; INTRAVENOUS ONCE
Status: COMPLETED | OUTPATIENT
Start: 2018-08-04 | End: 2018-08-04

## 2018-08-04 RX ORDER — FUROSEMIDE 20 MG
20 TABLET ORAL EVERY MORNING
Status: DISCONTINUED | OUTPATIENT
Start: 2018-08-05 | End: 2018-08-04 | Stop reason: HOSPADM

## 2018-08-04 RX ORDER — FUROSEMIDE 20 MG
20 TABLET ORAL EVERY MORNING
Qty: 5 TABLET | Refills: 0 | Status: SHIPPED | OUTPATIENT
Start: 2018-08-04 | End: 2018-08-09

## 2018-08-04 RX ADMIN — FUROSEMIDE 40 MG: 10 INJECTION, SOLUTION INTRAVENOUS at 17:45

## 2018-08-04 NOTE — ED AVS SNAPSHOT
[facility]    [address] [address]    Phone:  [phone]                                       Mary Irizarry   MRN: [id number]    Department:  [facility]   Date of Visit:  8/4/2018           Patient Information     Date Of Birth          8/27/1932        Your diagnoses for this visit were:     Scrotal edema     Dependent edema        You were seen by Dannielle Amador PA-C.      Follow-up Information     Follow up with JABIER Kent MD In 5 days.    Specialty:  Family Practice    Contact information:    [address]  [facility] [address] [phone]          Follow up with [facility].    Specialty:  EMERGENCY MEDICINE    Why:  If symptoms worsen    Contact information:    [address] [address] [phone]    Additional information:    From [facility]: Take [address]. Turn left at [address]. Turn left at the first stoplight on [address] [place] [address]. At the first stop sign, take a right onto [address]. Take a left into the parking lot and continue through until you reach the North enterance of the building.       From [place]: Take [address]. Take the [address] ramp towards [place]. Turn left onto [address]. Take a slight right onto [address]. Turn left at the first stoplight on [address]. At the first stop sign, take a right onto [address]. Take a left into the parking lot and continue through until you reach the North enterance of the building.       From [place]: Take [address]. Take a right at [address]. At the first stop sign, take a right onto [address]. Take a left into the parking lot and continue through until you reach the North enterance of the building.         Discharge Instructions       Take the Lasix 20mg every morning as prescribed. Follow up with Dr. Kent on Thursday as scheduled for re-check and he will  decide whether you need to continue the lasix or not. Please return here sooner with any new or worsening symptoms.       Coping with Edema  What is edema?  Edema is the build-up of fluid in the body, which causes swelling. Swelling most commonly occurs in the feet, ankles, lower legs or hands.  Swelling can occur in the belly or chest may be a sign of a more severe problem.  Certain medicines or conditions can make the swelling worse.  Symptoms include:    Feet and lower legs get larger when you sit or walk.    Hands feel tight when you make a fist.    When you push on the skin, skin stays dented.    Shiny, tight skin.    Fast weight gain.  How is it treated?  Your care team may give you a medicine to reduce the swelling.  They may also suggest that you meet with a dietitian. He or she can help with food choices to reduce the swelling.  What can I do about the swelling?    Place your feet above your heart 3 times a day: Sit with your feet up on a stool with a pillow. Sit on the bed or couch with two pillows under your feet.    Do not stand for long periods of time.    Wear loose-fitting clothes.    Do not cross your legs.    Reduce the salt in your diet.   These foods are high in salt:  ? Chips, soup  ? Frozen meals, TV dinners  ? Morton, lunch meat, ham  ? Sauces (soy, canned spaghetti sauce)    Walk or do other exercise.    Wear compression stockings.    Drink water as normal.    Weigh yourself every day at the same time to keep track of weight gain.  When should I call my care team?  Call your care team if:    You have a hard time breathing.    You gained 5 pounds or more in 1 week.    Your hands or feet feel cold when you touch them.    You are peeing very little or not at all.    Swelling is moving up your arms or legs.    Your tongue is swelling.    You cannot eat for more than a day  If you have any side effects, call us. We can help you manage these problems.  For more information,  see:  www.chemocare.com  www.cancer.org/treatment/treatmentsandsideeffects/physicalsideeffects/dealingwithsymptomsathome  www.cancer.gov/cancertopics/coping/chemotherapy-and-you  Comments:  __________________________________________  __________________________________________  __________________________________________  __________________________________________  __________________________________________  __________________________________________  __________________________________________  For informational purposes only. Not to replace the advice of your health care provider.  Copyright   2014 [place] [employer]. All rights reserved. [employer] 264595 - REV 03/16.      Taking a Diuretic  Your healthcare provider has prescribed a diuretic, or  water pill,  to help your body get rid of excess water and salt and maintain appropriate fluid balance. Taking your diuretic can help you feel better, breathe better, move more easily, and have more energy.     Take your medication early in the day at the same time each day.    The name of my diuretic is:     ________________________________________   Medicine tips    Read the fact sheet that comes with your medicine. It tells you when and how to take it. Ask for a medicine sheet if you don t get one.    If you take 2 or more doses each day, take the last one before dinner if you can. That way you ll get up fewer times during the night to go to the bathroom. However, make sure you have enough time between doses during the day.     If you miss a dose, take it as soon as you remember. If it is almost time for the next dose, skip the missed dose. Do not take a double dose.    If you miss 2 or more consecutive doses, call your healthcare provider. You may be at risk for fluid buildup.  For your safety    Follow your doctor s guidelines for potassium intake. You may need to take a potassium supplement. Or, you may need to avoid potassium supplements, salt substitutes with  potassium, or large amounts of high-potassium foods (such as bananas, potatoes, broccoli, and milk). Recommendations for potassium will depend on the type of diuretic you are prescribed along with your kidney function and other factors. Your healthcare provider will likely want to check your potassium level regularly while you are taking this medicine.    Talk to your healthcare provider about whether it is safe for you to drink alcohol while taking this medicine.    Get up slowly when you are sitting or lying down. This helps prevent dizziness and falls due to dizziness.    Ask your healthcare provider or pharmacist before you take any other prescription or nonprescription medicine or herbal supplements. Some of them may interact with your diuretic and keep it from working correctly.    Limit exposure to sunlight. A diuretic may increase your sensitivity to the sun. Even brief sun exposure may cause skin rash, itching, redness, or other discoloration. It may also lead to severe sunburn. To protect your skin do the following:  ? Avoid direct sunlight, especially between 10 a.m. and 2 p.m., whenever possible.  ? Apply a daily sunblock of at least SPF 15 (or higher) to any exposed skin, including your lips.  ? Wear protective clothing, such as a hat and sunglasses, when you are outdoors.  ? Avoid sunlamps and tanning booths.  ? Long-sleeve shirts and pants in the summer can help protect your skin.        When to seek medical advice  Call your healthcare provider right away if any of these occur:    Have diarrhea, constipation, nausea or vomiting.    Lose your appetite or notice a rapid or excessive weight gain.    Feel extremely tired or weak.    Have shortness of breath or difficulty breathing or swallowing.    Have numbness or tingling in your hands, feet, or lips or a ringing in your ears.    Feel lightheaded when getting up after sitting or lying down.    Have headaches, blurred vision, or feel a sense of  confusion.    Have muscle cramps or joint pain.    Have chest pains or changes in your heartbeat.    Have an excessive thirst or a dry mouth.    Notice a skin rash.    Gain more than 2 pounds in 1 day or 4 pounds in 1 week (ask your healthcare provider for his or her specific direction).    Have any other unusual symptoms.   Date Last Reviewed: 6/1/2016 2000-2017 [employer]. [address] [place] [address], [place] [address]. All rights reserved. This information is not intended as a substitute for professional medical care. Always follow your healthcare professional's instructions.          Your next 10 appointments already scheduled     Aug 09, 2018 10:15 AM CDT   (Arrive by 10:00 AM)   SHORT with JABIER Kent MD   [facility] [place] ([facility] [place] )    [address]  [place] [address]   [phone]            Aug 23, 2018  8:00 AM CDT   Anticoagulation Visit with HC ANTI COAGULATION   [facility] [place] ([facility] [place] )    [address]  [place] [address]   [phone]            Oct 08, 2018  3:00 PM CDT   LAB with HC LAB   [facility] [place] ([facility] [place] )    [address]  [place] [address]   [phone]            Oct 15, 2018  3:00 PM CDT   (Arrive by 2:45 PM)   Return Visit with Don Geiger MD   [facility] [place] ([facility] [place] )    [address]  [place] [address]   [phone]                 Review of your medicines      START taking        Dose / Directions Last dose taken    furosemide 20 MG tablet   Commonly known as:  LASIX   Dose:  20 mg   Quantity:  5 tablet        Take 1 tablet (20 mg) by mouth every morning   Refills:  0          Our records show that you are taking the medicines listed below. If these are incorrect, please call your family doctor or clinic.        Dose / Directions Last dose taken    amLODIPine 5 MG tablet   Commonly known as:  NORVASC    Quantity:  30 tablet        TAKE 1 TABLET BY MOUTH DAILY WITH DINNER   Refills:  10        B-12 1000 MCG Caps   Dose:  1000 mcg   Quantity:  90 capsule        Take 1,000 mcg by mouth daily   Refills:  3        docusate sodium 100 MG tablet   Commonly known as:  COLACE   Dose:  200 mg   Quantity:  60 tablet        Take 200 mg by mouth daily   Refills:  1        doxazosin 8 MG tablet   Commonly known as:  CARDURA   Dose:  8 mg   Quantity:  90 tablet        Take 1 tablet (8 mg) by mouth daily   Refills:  3        folic acid 1 MG tablet   Commonly known as:  FOLVITE   Dose:  1 mg   Quantity:  90 tablet        Take 1 tablet (1 mg) by mouth daily   Refills:  3        omeprazole 40 MG capsule   Commonly known as:  priLOSEC   Dose:  40 mg   Quantity:  30 capsule        Take 1 capsule (40 mg) by mouth daily   Refills:  0        warfarin 2.5 MG tablet   Commonly known as:  COUMADIN   Quantity:  110 tablet        TAKE 1/2 TABLET (1.25MG) ON WEDNESDAYS AND 1 TABLET (2.5MG) ON ALL OTHER DAYS OR AS DIRECTED BY THE [facility]   Refills:  3                Prescriptions were sent or printed at these locations (1 Prescription)                   [facility]  [address]   [address] [place] [address]    Telephone:  [phone]   Fax:  [phone]   Hours:                  E-Prescribed (1 of 1)         furosemide (LASIX) 20 MG tablet                Procedures and tests performed during your visit     Bladder scan    CBC with platelets differential    Comprehensive metabolic panel    INR    NT pro BNP    Peripheral IV catheter      Orders Needing Specimen Collection     Ordered          08/04/18 0419  UA reflex to Microscopic and Culture - ROUTINE, Prio: Routine, Status: Sent     Scheduled Task Status   08/04/18 2850 [employer] [facility] Reminder: Open   Order Class:  PCU Collect                  Pending Results     No orders found from 8/2/2018 to 8/5/2018.            Pending Culture Results     No  orders found from 8/2/2018 to 8/5/2018.            Thank you for choosing [facility]       Thank you for choosing [facility] for your care. Our goal is always to provide you with excellent care. Hearing back from our patients is one way we can continue to improve our services. Please take a few minutes to complete the written survey that you may receive in the mail after you visit with us. Thank you!        Care EveryWhere ID     This is your Care EveryWhere ID. This could be used by other organizations to access your [facility] medical records  [id number]        Equal Access to Services     SUSAN XAVIER : Rosales Grace, mary cabezas, itz mcdonnellalizabella perry, delia perera . So [facility] [phone].    ATENCIÓN: Si habla español, tiene a quiroz disposición servicios gratuitos de asistencia lingüística. Llame al [phone].    We comply with applicable federal civil rights laws and [place] laws. We do not discriminate on the basis of race, color, national origin, age, disability, sex, sexual orientation, or gender identity.            After Visit Summary       This is your record. Keep this with you and show to your community pharmacist(s) and doctor(s) at your next visit.

## 2018-08-04 NOTE — DISCHARGE INSTRUCTIONS
Take the Lasix 20mg every morning as prescribed. Follow up with Dr. Kent on Thursday as scheduled for re-check and he will decide whether you need to continue the lasix or not. Please return here sooner with any new or worsening symptoms.       Coping with Edema  What is edema?  Edema is the build-up of fluid in the body, which causes swelling. Swelling most commonly occurs in the feet, ankles, lower legs or hands.  Swelling can occur in the belly or chest may be a sign of a more severe problem.  Certain medicines or conditions can make the swelling worse.  Symptoms include:    Feet and lower legs get larger when you sit or walk.    Hands feel tight when you make a fist.    When you push on the skin, skin stays dented.    Shiny, tight skin.    Fast weight gain.  How is it treated?  Your care team may give you a medicine to reduce the swelling.  They may also suggest that you meet with a dietitian. He or she can help with food choices to reduce the swelling.  What can I do about the swelling?    Place your feet above your heart 3 times a day: Sit with your feet up on a stool with a pillow. Sit on the bed or couch with two pillows under your feet.    Do not stand for long periods of time.    Wear loose-fitting clothes.    Do not cross your legs.    Reduce the salt in your diet.   These foods are high in salt:  ? Chips, soup  ? Frozen meals, TV dinners  ? Morton, lunch meat, ham  ? Sauces (soy, canned spaghetti sauce)    Walk or do other exercise.    Wear compression stockings.    Drink water as normal.    Weigh yourself every day at the same time to keep track of weight gain.  When should I call my care team?  Call your care team if:    You have a hard time breathing.    You gained 5 pounds or more in 1 week.    Your hands or feet feel cold when you touch them.    You are peeing very little or not at all.    Swelling is moving up your arms or legs.    Your tongue is swelling.    You cannot eat for more than a  day  If you have any side effects, call us. We can help you manage these problems.  For more information, see:  www.chemocare.com  www.cancer.org/treatment/treatmentsandsideeffects/physicalsideeffects/dealingwithsymptomsathome  www.cancer.gov/cancertopics/coping/chemotherapy-and-you  Comments:  __________________________________________  __________________________________________  __________________________________________  __________________________________________  __________________________________________  __________________________________________  __________________________________________  For informational purposes only. Not to replace the advice of your health care provider.  Copyright   2014 [employer] [employer]. All rights reserved. [employer] 093954 - REV 03/16.      Taking a Diuretic  Your healthcare provider has prescribed a diuretic, or  water pill,  to help your body get rid of excess water and salt and maintain appropriate fluid balance. Taking your diuretic can help you feel better, breathe better, move more easily, and have more energy.     Take your medication early in the day at the same time each day.    The name of my diuretic is:     ________________________________________   Medicine tips    Read the fact sheet that comes with your medicine. It tells you when and how to take it. Ask for a medicine sheet if you don t get one.    If you take 2 or more doses each day, take the last one before dinner if you can. That way you ll get up fewer times during the night to go to the bathroom. However, make sure you have enough time between doses during the day.     If you miss a dose, take it as soon as you remember. If it is almost time for the next dose, skip the missed dose. Do not take a double dose.    If you miss 2 or more consecutive doses, call your healthcare provider. You may be at risk for fluid buildup.  For your safety    Follow your doctor s guidelines for potassium intake. You may  need to take a potassium supplement. Or, you may need to avoid potassium supplements, salt substitutes with potassium, or large amounts of high-potassium foods (such as bananas, potatoes, broccoli, and milk). Recommendations for potassium will depend on the type of diuretic you are prescribed along with your kidney function and other factors. Your healthcare provider will likely want to check your potassium level regularly while you are taking this medicine.    Talk to your healthcare provider about whether it is safe for you to drink alcohol while taking this medicine.    Get up slowly when you are sitting or lying down. This helps prevent dizziness and falls due to dizziness.    Ask your healthcare provider or pharmacist before you take any other prescription or nonprescription medicine or herbal supplements. Some of them may interact with your diuretic and keep it from working correctly.    Limit exposure to sunlight. A diuretic may increase your sensitivity to the sun. Even brief sun exposure may cause skin rash, itching, redness, or other discoloration. It may also lead to severe sunburn. To protect your skin do the following:  ? Avoid direct sunlight, especially between 10 a.m. and 2 p.m., whenever possible.  ? Apply a daily sunblock of at least SPF 15 (or higher) to any exposed skin, including your lips.  ? Wear protective clothing, such as a hat and sunglasses, when you are outdoors.  ? Avoid sunlamps and tanning booths.  ? Long-sleeve shirts and pants in the summer can help protect your skin.        When to seek medical advice  Call your healthcare provider right away if any of these occur:    Have diarrhea, constipation, nausea or vomiting.    Lose your appetite or notice a rapid or excessive weight gain.    Feel extremely tired or weak.    Have shortness of breath or difficulty breathing or swallowing.    Have numbness or tingling in your hands, feet, or lips or a ringing in your ears.    Feel lightheaded  when getting up after sitting or lying down.    Have headaches, blurred vision, or feel a sense of confusion.    Have muscle cramps or joint pain.    Have chest pains or changes in your heartbeat.    Have an excessive thirst or a dry mouth.    Notice a skin rash.    Gain more than 2 pounds in 1 day or 4 pounds in 1 week (ask your healthcare provider for his or her specific direction).    Have any other unusual symptoms.   Date Last Reviewed: 6/1/2016 2000-2017 [employer]. [address] [place] [address], [place] [address]. All rights reserved. This information is not intended as a substitute for professional medical care. Always follow your healthcare professional's instructions.

## 2018-08-04 NOTE — ED NOTES
Pt and wife state Pt's legs began swelling in January and progressively worked up his legs until he began with scrotal edema 2 days ago.

## 2018-08-04 NOTE — ED AVS SNAPSHOT
[facility]    [address]    [place] [address]    Phone:  [phone]                                       Mary Irizarry   MRN: [id number]    Department:  [facility]   Date of Visit:  8/4/2018           After Visit Summary Signature Page     I have received my discharge instructions, and my questions have been answered. I have discussed any challenges I see with this plan with the nurse or doctor.    ..........................................................................................................................................  Patient/Patient Representative Signature      ..........................................................................................................................................  Patient Representative Print Name and Relationship to Patient    ..................................................               ................................................  Date                                            Time    ..........................................................................................................................................  Reviewed by Signature/Title    ...................................................              ..............................................  Date                                                            Time

## 2018-08-05 NOTE — ED PROVIDER NOTES
"  History     Chief Complaint   Patient presents with     Groin Swelling     bilateral testicles swollen for about 2 days. having somewhat of a difficulty urinating. also has swelling in legs.     HPI  Mary Irizarry is a 85 year old male who presents with slowly increased bilateral LE edema x 3 months, now with scrotal and penis swelling over the past week. He has h/o PE's and bilateral LE DVT's, on coumadin. He hasn't been wearing his compression stockings because they don't fit. Denies CP, or dyspnea. Denies abdominal pain/pressue, or difficulty urinating. He states, \"I just can't see my penis.\"    Problem List:    Patient Active Problem List    Diagnosis Date Noted     Long-term (current) use of anticoagulants [Z79.01] 01/17/2018     Priority: Medium     Acute respiratory failure with hypoxia (H) 01/12/2018     Priority: Medium     Pulmonary embolism (H) 01/11/2018     Priority: Medium     Hypoxia 01/11/2018     Priority: Medium     Elevated troponin I level 01/11/2018     Priority: Medium     Pulmonary embolism, bilateral (H) 01/11/2018     Priority: Medium     Lesion of left pinna 09/11/2017     Priority: Medium     Essential hypertension, benign 09/11/2017     Priority: Medium     ACP (advance care planning) 08/16/2016     Priority: Medium     Advance Care Planning 8/16/2016: ACP Review of Chart / Resources Provided:  Reviewed chart for advance care plan.  Mary Irizarry has no plan or code status on file. Discussed available resources and declined information.Carlie Alicea             Facial lesion 08/28/2015     Priority: Medium     Left inguinal hernia 08/28/2015     Priority: Medium        Past Medical History:    Past Medical History:   Diagnosis Date     Hypertension      Pulmonary embolism (H)        Past Surgical History:    Past Surgical History:   Procedure Laterality Date     GENITOURINARY SURGERY      prostatectomy       Family History:    Family History   Problem Relation Age of Onset     " Hypertension Mother      Unknown/Adopted Father      No Known Problems Maternal Grandmother      No Known Problems Maternal Grandfather      No Known Problems Paternal Grandmother      No Known Problems Paternal Grandfather      No Known Problems Sister      No Known Problems Son      No Known Problems Son      Deep Vein Thrombosis (DVT) Son        Social History:  Marital Status:   [2]  Social History   Substance Use Topics     Smoking status: Former Smoker     Smokeless tobacco: Never Used      Comment: 2 mos while in high school.      Alcohol use 0.0 oz/week     0 Standard drinks or equivalent per week      Comment: occa        Medications:      amLODIPine (NORVASC) 5 MG tablet   Cyanocobalamin (B-12) 1000 MCG CAPS   docusate sodium (COLACE) 100 MG tablet   doxazosin (CARDURA) 8 MG tablet   folic acid (FOLVITE) 1 MG tablet   furosemide (LASIX) 20 MG tablet   omeprazole (PRILOSEC) 40 MG capsule   warfarin (COUMADIN) 2.5 MG tablet         Review of Systems   All other systems reviewed and are negative.      Physical Exam   BP: 156/78  Pulse: 88  Temp: 97.6  F (36.4  C)  Resp: 16  SpO2: 97 %      Physical Exam   Constitutional: He is oriented to person, place, and time. He appears well-developed and well-nourished. No distress.   HENT:   Head: Normocephalic and atraumatic.   Right Ear: External ear normal.   Left Ear: External ear normal.   Nose: Nose normal.   Mouth/Throat: Oropharynx is clear and moist. No oropharyngeal exudate.   Eyes: Conjunctivae and EOM are normal. Pupils are equal, round, and reactive to light. Right eye exhibits no discharge. Left eye exhibits no discharge. No scleral icterus.   Neck: Normal range of motion. Neck supple. No JVD present.   Cardiovascular: Normal rate, regular rhythm, normal heart sounds and intact distal pulses.  Exam reveals no gallop and no friction rub.    No murmur heard.  Pulmonary/Chest: Effort normal and breath sounds normal. No respiratory distress. He has no  wheezes. He has no rales. He exhibits no tenderness.   Abdominal: There is no tenderness.   Musculoskeletal: Normal range of motion. He exhibits edema (2+ bilateral LE edema and non-pitting edema to scrotum and penis. ).   Lymphadenopathy:     He has no cervical adenopathy.   Neurological: He is alert and oriented to person, place, and time. No cranial nerve deficit. Coordination normal.   Skin: Skin is warm and dry. No rash noted. He is not diaphoretic. No erythema. No pallor.   Psychiatric: He has a normal mood and affect. His behavior is normal. Judgment and thought content normal.   Nursing note and vitals reviewed.      ED Course     ED Course     Procedures                 Results for orders placed or performed during the hospital encounter of 08/04/18 (from the past 24 hour(s))   CBC with platelets differential   Result Value Ref Range    WBC 6.2 4.0 - 11.0 10e9/L    RBC Count 4.06 (L) 4.4 - 5.9 10e12/L    Hemoglobin 11.2 (L) 13.3 - 17.7 g/dL    Hematocrit 35.0 (L) 40.0 - 53.0 %    MCV 86 78 - 100 fl    MCH 27.6 26.5 - 33.0 pg    MCHC 32.0 31.5 - 36.5 g/dL    RDW 13.5 10.0 - 15.0 %    Platelet Count 192 150 - 450 10e9/L    Diff Method Automated Method     % Neutrophils 72.6 %    % Lymphocytes 18.9 %    % Monocytes 7.4 %    % Eosinophils 0.6 %    % Basophils 0.3 %    % Immature Granulocytes 0.2 %    Nucleated RBCs 0 0 /100    Absolute Neutrophil 4.5 1.6 - 8.3 10e9/L    Absolute Lymphocytes 1.2 0.8 - 5.3 10e9/L    Absolute Monocytes 0.5 0.0 - 1.3 10e9/L    Absolute Eosinophils 0.0 0.0 - 0.7 10e9/L    Absolute Basophils 0.0 0.0 - 0.2 10e9/L    Abs Immature Granulocytes 0.0 0 - 0.4 10e9/L    Absolute Nucleated RBC 0.0    Comprehensive metabolic panel   Result Value Ref Range    Sodium 145 (H) 133 - 144 mmol/L    Potassium 4.2 3.4 - 5.3 mmol/L    Chloride 111 (H) 94 - 109 mmol/L    Carbon Dioxide 27 20 - 32 mmol/L    Anion Gap 7 3 - 14 mmol/L    Glucose 98 70 - 99 mg/dL    Urea Nitrogen 20 7 - 30 mg/dL     Creatinine 0.96 0.66 - 1.25 mg/dL    GFR Estimate 75 >60 mL/min/1.7m2    GFR Estimate If Black >90 >60 mL/min/1.7m2    Calcium 8.3 (L) 8.5 - 10.1 mg/dL    Bilirubin Total 0.5 0.2 - 1.3 mg/dL    Albumin 3.4 3.4 - 5.0 g/dL    Protein Total 7.2 6.8 - 8.8 g/dL    Alkaline Phosphatase 63 40 - 150 U/L    ALT 31 0 - 70 U/L    AST 18 0 - 45 U/L   NT pro BNP   Result Value Ref Range    N-Terminal Pro BNP Inpatient 1805 (H) 0 - 1800 pg/mL   INR   Result Value Ref Range    INR 2.00 (H) 0.80 - 1.20       Medications   furosemide (LASIX) injection 40 mg (40 mg Intravenous Given 8/4/18 1745)       Assessments & Plan (with Medical Decision Making)   Mary has had worsening bilateral LE edema for months with now scrotal/penis swelling over the past week. No urinary retention noted today. Post void bladder scan revealed no urine in the bladder. No dyspnea or wheezing. H/o bilateral LE DVT's so edema likely due to venous insufficiency as a result of the DVT's. He was given Lasix 40mg IV and had 1,000ml's out. Will start him on Lasix 20mg daily x 5 days. Has f/u appt with Dr. Kent this Thursday.  He and his wife are happy with this plan and he was discharged home in good condition following.     Plan: Take the Lasix 20mg every morning as prescribed. Follow up with Dr. Kent on Thursday as scheduled for re-check and he will decide whether you need to continue the lasix or not. Please return here sooner with any new or worsening symptoms.     I have reviewed the nursing notes.    I have reviewed the findings, diagnosis, plan and need for follow up with the patient.    Discharge Medication List as of 8/4/2018  6:55 PM      START taking these medications    Details   furosemide (LASIX) 20 MG tablet Take 1 tablet (20 mg) by mouth every morning, Disp-5 tablet, R-0, E-Prescribe             Final diagnoses:   Scrotal edema   Dependent edema       8/4/2018   [facility]     Dannielle Amador PA-C  08/05/18 1378

## 2018-08-06 NOTE — PROGRESS NOTES
SUBJECTIVE:   Mary Irizarry is a 85 year old male who presents to clinic today for the following health issues:      ED/UC Followup:    Facility:  [facility]   Date of visit: 8/04/2018  Reason for visit: Scrotal Edema/Dependent Edema   Current Status: much better than it was in the legs. It is still there  But gone down significantly. Scrotal swelling is gone     He did have an episode of rectal bleeding was seen by surgery but because he is on Coumadin and with his age he was decided not to proceed with colonoscopy.  Patient denies any further rectal bleeding.  States with the 5 days of Lasix that his scrotal swelling and leg swelling has significantly improved.  Denies any acute shortness of breath.  Is on the Coumadin because of a history of PE.  His aspirin was held.    Musculoskeletal problem/pain      Duration: about a year    Description  Location: left shoulder    Intensity:  severe    Accompanying signs and symptoms: none    History  Previous similar problem: no   Previous evaluation:  none    Precipitating or alleviating factors:  Trauma or overuse: no   Aggravating factors include: any moving above the head or the elbow away from body    Therapies tried and outcome: stretching    No specific trauma.        PAST MEDICAL HISTORY:  Past Medical History:   Diagnosis Date     Hypertension      Pulmonary embolism (H)        PAST SURGICAL HISTORY:  Past Surgical History:   Procedure Laterality Date     GENITOURINARY SURGERY      prostatectomy       MEDICATIONS:  Prior to Admission medications    Medication Sig Start Date End Date Taking? Authorizing Provider   amLODIPine (NORVASC) 5 MG tablet TAKE 1 TABLET BY MOUTH DAILY WITH DINNER 2/5/18  Yes JABIER Kent MD   Cyanocobalamin (B-12) 1000 MCG CAPS Take 1,000 mcg by mouth daily 4/17/18  Yes Don Geiger MD   doxazosin (CARDURA) 8 MG tablet Take 1 tablet (8 mg) by mouth daily 9/11/17  Yes JABIER Kent MD   folic acid (FOLVITE) 1 MG tablet Take  1 tablet (1 mg) by mouth daily 4/17/18  Yes Don Geiger MD   furosemide (LASIX) 20 MG tablet Take 1 tablet (20 mg) by mouth daily as needed 8/9/18  Yes JABIER Kent MD   warfarin (COUMADIN) 2.5 MG tablet TAKE 1/2 TABLET (1.25MG) ON WEDNESDAYS AND 1 TABLET (2.5MG) ON ALL OTHER DAYS OR AS DIRECTED BY THE [facility] 4/10/18  Yes JABIER Kent MD       ALLERGIES:   No Known Allergies    ROS:  Constitutional, neuro, ENT, endocrine, pulmonary, cardiac, gastrointestinal, genitourinary, musculoskeletal, integument and psychiatric systems are negative, except as otherwise noted.      EXAM:  /74  Pulse 84  Temp 97.2  F (36.2  C) (Tympanic)  Wt 162 lb (73.5 kg)  SpO2 95%  BMI 25.37 kg/m2 Body mass index is 25.37 kg/(m^2).   GENERAL APPEARANCE: healthy, alert and no distress  EYES: Eyes grossly normal to inspection, PERRL and conjunctivae and sclerae normal  NECK: no adenopathy, no asymmetry, masses, or scars and thyroid normal to palpation  RESP: lungs clear to auscultation - no rales, rhonchi or wheezes  CV: regular rates and rhythm, normal S1 S2, no S3 or S4 and no murmur, click or rub he has just trace pedal edema sounds like it was more significant a few days ago in the ER.  Also has just trace scrotal swelling now compared to what it was previous.  ORTHO: He has tenderness over the proximal bicep tendon no tenderness over the clavicle or the cervical spine or the glenohumeral joint.  He is limited with abduction to about 10 .  Lab/ X-ray  Pending    ASSESSMENT/PLAN:    ICD-10-CM    1. Pedal edema R60.0 furosemide (LASIX) 20 MG tablet     Basic metabolic panel     N terminal pro BNP outpatient   2. Heart failure, unspecified heart failure chronicity, unspecified heart failure type (H)  I50.9 N terminal pro BNP outpatient   3. Chronic left shoulder pain M25.512 PHYSICAL THERAPY REFERRAL    G89.29    He had an elevated BNP has heart failure unspecified which type.  They are not interested in a  full cardiac workup at his age.  His leg swelling is significantly better.  He will finish today's Lasix and then will have completed a 6 day course.  Prescription was given for Lasix 20 mg daily as needed if he does need it he can do a 5 or 6 day course and then stop.  Will check a BNP and a BMP.  Advised that they refrain from salt.  Regarding the left shoulder will get him into physical therapy.  He can try hot or cold packs and Tylenol.  Again at his age with his history of PE on the Coumadin he is not a good surgical candidate and they just want to have him comfortable and are not interested in any aggressive surgery.      HARRIETT Kent MD  August 9, 2018

## 2018-08-09 ENCOUNTER — OFFICE VISIT (OUTPATIENT)
Dept: FAMILY MEDICINE | Facility: OTHER | Age: 83
End: 2018-08-09
Attending: FAMILY MEDICINE
Payer: COMMERCIAL

## 2018-08-09 ENCOUNTER — TELEPHONE (OUTPATIENT)
Dept: FAMILY MEDICINE | Facility: OTHER | Age: 83
End: 2018-08-09

## 2018-08-09 VITALS
WEIGHT: 162 LBS | HEART RATE: 84 BPM | TEMPERATURE: 97.2 F | SYSTOLIC BLOOD PRESSURE: 116 MMHG | DIASTOLIC BLOOD PRESSURE: 74 MMHG | BODY MASS INDEX: 25.37 KG/M2 | OXYGEN SATURATION: 95 %

## 2018-08-09 DIAGNOSIS — M25.512 CHRONIC LEFT SHOULDER PAIN: ICD-10-CM

## 2018-08-09 DIAGNOSIS — I50.9 HEART FAILURE, UNSPECIFIED HEART FAILURE CHRONICITY, UNSPECIFIED HEART FAILURE TYPE: ICD-10-CM

## 2018-08-09 DIAGNOSIS — G89.29 CHRONIC LEFT SHOULDER PAIN: ICD-10-CM

## 2018-08-09 DIAGNOSIS — R60.0 PEDAL EDEMA: Primary | ICD-10-CM

## 2018-08-09 LAB
ANION GAP SERPL CALCULATED.3IONS-SCNC: 5 MMOL/L (ref 3–14)
BUN SERPL-MCNC: 21 MG/DL (ref 7–30)
CALCIUM SERPL-MCNC: 8.2 MG/DL (ref 8.5–10.1)
CHLORIDE SERPL-SCNC: 106 MMOL/L (ref 94–109)
CO2 SERPL-SCNC: 31 MMOL/L (ref 20–32)
CREAT SERPL-MCNC: 1.02 MG/DL (ref 0.66–1.25)
GFR SERPL CREATININE-BSD FRML MDRD: 69 ML/MIN/1.7M2
GLUCOSE SERPL-MCNC: 89 MG/DL (ref 70–99)
NT-PROBNP SERPL-MCNC: 1827 PG/ML (ref 0–450)
POTASSIUM SERPL-SCNC: 3.6 MMOL/L (ref 3.4–5.3)
SODIUM SERPL-SCNC: 142 MMOL/L (ref 133–144)

## 2018-08-09 PROCEDURE — G0463 HOSPITAL OUTPT CLINIC VISIT: HCPCS

## 2018-08-09 PROCEDURE — 99214 OFFICE O/P EST MOD 30 MIN: CPT | Performed by: FAMILY MEDICINE

## 2018-08-09 PROCEDURE — 36415 COLL VENOUS BLD VENIPUNCTURE: CPT | Mod: ZL | Performed by: FAMILY MEDICINE

## 2018-08-09 PROCEDURE — 83880 ASSAY OF NATRIURETIC PEPTIDE: CPT | Mod: ZL | Performed by: FAMILY MEDICINE

## 2018-08-09 PROCEDURE — 80048 BASIC METABOLIC PNL TOTAL CA: CPT | Mod: ZL | Performed by: FAMILY MEDICINE

## 2018-08-09 RX ORDER — FUROSEMIDE 20 MG
20 TABLET ORAL DAILY PRN
Qty: 30 TABLET | Refills: 1 | Status: SHIPPED | OUTPATIENT
Start: 2018-08-09 | End: 2018-09-13

## 2018-08-09 ASSESSMENT — PAIN SCALES - GENERAL: PAINLEVEL: MODERATE PAIN (5)

## 2018-08-09 NOTE — NURSING NOTE
"Chief Complaint   Patient presents with     Edema       Initial /74  Pulse 84  Temp 97.2  F (36.2  C) (Tympanic)  Wt 162 lb (73.5 kg)  SpO2 95%  BMI 25.37 kg/m2 Estimated body mass index is 25.37 kg/(m^2) as calculated from the following:    Height as of 7/20/18: 5' 7\" (1.702 m).    Weight as of this encounter: 162 lb (73.5 kg).  Medication Reconciliation: complete    Janice Swenson MA    "

## 2018-08-09 NOTE — TELEPHONE ENCOUNTER
12:01 PM    Reason for Call: Phone Call    Description: Patient was returning nurses call    Was an appointment offered for this call? No  If yes : Appointment type              Date    Preferred method for responding to this message: Telephone Call  What is your phone number ? [phone]    If we cannot reach you directly, may we leave a detailed response at the number you provided? Yes    Can this message wait until your PCP/provider returns, if available today? YES, Provider in     Kelly Shoen

## 2018-08-09 NOTE — MR AVS SNAPSHOT
After Visit Summary   8/9/2018    Mary Irizarry    MRN: [id number]           Patient Information     Date Of Birth          8/27/1932        Visit Information        Provider Department      8/9/2018 10:15 AM JABIER Kent MD [facility]        Today's Diagnoses     Pedal edema    -  1    Heart failure, unspecified heart failure chronicity, unspecified heart failure type (H)         Chronic left shoulder pain          Care Instructions    We will call with the labs.            Follow-ups after your visit        Additional Services     PHYSICAL THERAPY REFERRAL       *This order will print in the [facility] Central Scheduling Office*    [facility] provides Physical Therapy evaluation and treatment and many specialty services across the [facility] system.  If requesting a specialty program, please choose from the list below.    Call [phone] to schedule [facility] at all locations, with the exception of [facility], please call [phone].     Treatment: Evaluation & Treatment  Special Instructions/Modalities:   Special Programs:     Please be aware that coverage of these services is subject to the terms and limitations of your health insurance plan.  Call member services at your health plan with any benefit or coverage questions.      **Note to Provider** To refer patients to therapy outside of the location list, change the order class to External Referral in the order composer.                  Your next 10 appointments already scheduled     Aug 23, 2018  8:00 AM CDT   Anticoagulation Visit with HC ANTI COAGULATION   [facility] )    [address] [place] [address]   [phone]            Oct 08, 2018  3:00 PM CDT   LAB with HC LAB   [facility] )    [address] [place] [address]   [phone]            Oct  15, 2018  3:00 PM CDT   (Arrive by 2:45 PM)   Return Visit with Don Geiger MD   [facility] )    [address] [place] [address]   [phone]              Who to contact     If you have questions or need follow up information about today's clinic visit or your schedule please contact [facility] directly at [phone].  Normal or non-critical lab and imaging results will be communicated to you by MyChart, letter or phone within 4 business days after the clinic has received the results. If you do not hear from us within 7 days, please contact the clinic through MyChart or phone. If you have a critical or abnormal lab result, we will notify you by phone as soon as possible.  Submit refill requests through [employer] or call your pharmacy and they will forward the refill request to us. Please allow 3 business days for your refill to be completed.          Additional Information About Your Visit        Care EveryWhere ID     This is your Care EveryWhere ID. This could be used by other organizations to access your [facility] medical records  [id number]        Your Vitals Were     Pulse Temperature Pulse Oximetry BMI (Body Mass Index)          84 97.2  F (36.2  C) (Tympanic) 95% 25.37 kg/m2         Blood Pressure from Last 3 Encounters:   08/09/18 116/74   08/04/18 166/88   07/20/18 110/60    Weight from Last 3 Encounters:   08/09/18 162 lb (73.5 kg)   07/20/18 166 lb (75.3 kg)   04/17/18 162 lb 4.8 oz (73.6 kg)              We Performed the Following     Basic metabolic panel     N terminal pro BNP outpatient     PHYSICAL THERAPY REFERRAL          Today's Medication Changes          These changes are accurate as of 8/9/18 10:30 AM.  If you have any questions, ask your nurse or doctor.               These medicines have changed or have updated prescriptions.        Dose/Directions    furosemide 20 MG tablet   Commonly known as:  LASIX   This may  have changed:    - when to take this  - reasons to take this   Used for:  Pedal edema   Changed by:  JABIER Kent MD        Dose:  20 mg   Take 1 tablet (20 mg) by mouth daily as needed   Quantity:  30 tablet   Refills:  1            Where to get your medicines      These medications were sent to [facility] - [place], [place] - [address]  [address], [facility] [address]     Phone:  [phone]     furosemide 20 MG tablet                Primary Care Provider Office Phone # Fax #    R Uriel Kent [phone]       [address] [address]        Equal Access to Services     [facility]: Hadii savannah rodríguez hadasho Soomaali, waaxda luqadaha, qaybta kaalmada adeegyada, delia perera . So [facility] [phone].    ATENCIÓN: Si habla español, tiene a quiroz disposición servicios gratuitos de asistencia lingüística. Llame al [phone].    We comply with applicable federal civil rights laws and [place] laws. We do not discriminate on the basis of race, color, national origin, age, disability, sex, sexual orientation, or gender identity.            Thank you!     Thank you for choosing [facility]  for your care. Our goal is always to provide you with excellent care. Hearing back from our patients is one way we can continue to improve our services. Please take a few minutes to complete the written survey that you may receive in the mail after your visit with us. Thank you!             Your Updated Medication List - Protect others around you: Learn how to safely use, store and throw away your medicines at www.disposemymeds.org.          This list is accurate as of 8/9/18 10:30 AM.  Always use your most recent med list.                   Brand Name Dispense Instructions for use Diagnosis    amLODIPine 5 MG tablet    NORVASC    30 tablet    TAKE 1 TABLET BY MOUTH DAILY WITH DINNER    Essential hypertension, benign       B-12 1000 MCG Caps      90 capsule    Take 1,000 mcg by mouth daily    Primary hypercoagulable state (H)       doxazosin 8 MG tablet    CARDURA    90 tablet    Take 1 tablet (8 mg) by mouth daily    Essential hypertension, benign       folic acid 1 MG tablet    FOLVITE    90 tablet    Take 1 tablet (1 mg) by mouth daily    Primary hypercoagulable state (H)       furosemide 20 MG tablet    LASIX    30 tablet    Take 1 tablet (20 mg) by mouth daily as needed    Pedal edema       warfarin 2.5 MG tablet    COUMADIN    110 tablet    TAKE 1/2 TABLET (1.25MG) ON WEDNESDAYS AND 1 TABLET (2.5MG) ON ALL OTHER DAYS OR AS DIRECTED BY THE [facility]    Long-term (current) use of anticoagulants, Pulmonary embolus (H)

## 2018-08-23 ENCOUNTER — ANTICOAGULATION THERAPY VISIT (OUTPATIENT)
Dept: ANTICOAGULATION | Facility: OTHER | Age: 83
End: 2018-08-23
Attending: FAMILY MEDICINE
Payer: MEDICARE

## 2018-08-23 DIAGNOSIS — Z79.01 LONG-TERM (CURRENT) USE OF ANTICOAGULANTS: ICD-10-CM

## 2018-08-23 LAB — INR POINT OF CARE: 1.8 (ref 0.86–1.14)

## 2018-08-23 PROCEDURE — 36416 COLLJ CAPILLARY BLOOD SPEC: CPT | Mod: ZL

## 2018-08-23 NOTE — PROGRESS NOTES
ANTICOAGULATION FOLLOW-UP CLINIC VISIT    Patient Name:  Mary Irizarry  Date:  8/23/2018  Contact Type:  Face to Face    SUBJECTIVE:     Patient Findings     Comments Denies bleeding/bruising. No new changes in diet/meds/activity.           OBJECTIVE    INR Protime   Date Value Ref Range Status   08/23/2018 1.8 (A) 0.86 - 1.14 Final       ASSESSMENT / PLAN  INR assessment SUB    Recheck INR In: 3 WEEKS    INR Location Clinic      Anticoagulation Summary as of 8/23/2018     INR goal 2.0-3.0   Today's INR 1.8!   Warfarin maintenance plan 2.5 mg (2.5 mg x 1) every day   Full warfarin instructions 2.5 mg every day   Weekly warfarin total 17.5 mg   Plan last modified Marylou Joshi RN (8/23/2018)   Next INR check 9/13/2018   Target end date     Indications   Pulmonary embolism (H) [I26.99]  Long-term (current) use of anticoagulants [Z79.01] [Z79.01]         Anticoagulation Episode Summary     INR check location     Preferred lab     Send INR reminders to [facility] POOL    Comments       Anticoagulation Care Providers     Provider Role Specialty Phone number    JABIER Kent MD [facility] [phone]            See the Encounter Report to view Anticoagulation Flowsheet and Dosing Calendar (Go to Encounters tab in chart review, and find the Anticoagulation Therapy Visit)        Marylou Joshi RN

## 2018-08-23 NOTE — MR AVS SNAPSHOT
Mary Irizarry   8/23/2018 8:00 AM   Anticoagulation Therapy Visit    Description:  85 year old male   Provider:   ANTI COAGULATION   Department:  [facility] Anti Coagulation           INR as of 8/23/2018     Today's INR 1.8!      Anticoagulation Summary as of 8/23/2018     INR goal 2.0-3.0   Today's INR 1.8!   Full warfarin instructions 2.5 mg every day   Next INR check 9/13/2018    Indications   Pulmonary embolism (H) [I26.99]  Long-term (current) use of anticoagulants [Z79.01] [Z79.01]         Your next Anticoagulation Clinic appointment(s)     Aug 23, 2018  8:00 AM CDT   Anticoagulation Visit with  ANTI COAGULATION   [facility] [place] [facility]    [address]  [place] [address]   [phone]            Sep 13, 2018  1:00 PM CDT   Anticoagulation Visit with  ANTI COAGULATION   [facility] [place] [facility]    [address]  [place] [address]   [phone]              August 2018 Details    Sun Mon Tue Wed Thu Fri Sat        1               2               3               4                 5               6               7               8               9               10               11                 12               13               14               15               16               17               18                 19               20               21               22               23      2.5 mg   See details      24      2.5 mg         25      2.5 mg           26      2.5 mg         27      2.5 mg         28      2.5 mg         29      2.5 mg         30      2.5 mg         31      2.5 mg           Date Details   08/23 This INR check               How to take your warfarin dose     To take:  2.5 mg Take 1 of the 2.5 mg tablets.           September 2018 Details    Sun Mon Tue Wed Thu Fri Sat           1      2.5 mg           2      2.5 mg         3      2.5 mg         4      2.5 mg         5      2.5 mg         6      2.5 mg          7      2.5 mg         8      2.5 mg           9      2.5 mg         10      2.5 mg         11      2.5 mg         12      2.5 mg         13            14               15                 16               17               18               19               20               21               22                 23               24               25               26               27               28               29                 30                      Date Details   No additional details    Date of next INR:  9/13/2018         How to take your warfarin dose     To take:  2.5 mg Take 1 of the 2.5 mg tablets.

## 2018-08-29 ENCOUNTER — HOSPITAL ENCOUNTER (OUTPATIENT)
Dept: PHYSICAL THERAPY | Facility: HOSPITAL | Age: 83
Setting detail: THERAPIES SERIES
End: 2018-08-29
Attending: FAMILY MEDICINE
Payer: MEDICARE

## 2018-08-29 PROCEDURE — 97161 PT EVAL LOW COMPLEX 20 MIN: CPT | Mod: GP | Performed by: PHYSICAL THERAPIST

## 2018-08-29 PROCEDURE — 97140 MANUAL THERAPY 1/> REGIONS: CPT | Mod: GP | Performed by: PHYSICAL THERAPIST

## 2018-08-29 PROCEDURE — 97110 THERAPEUTIC EXERCISES: CPT | Mod: GP | Performed by: PHYSICAL THERAPIST

## 2018-08-29 PROCEDURE — G8984 CARRY CURRENT STATUS: HCPCS | Mod: GP,CK | Performed by: PHYSICAL THERAPIST

## 2018-08-29 PROCEDURE — G8985 CARRY GOAL STATUS: HCPCS | Mod: GP,CJ | Performed by: PHYSICAL THERAPIST

## 2018-08-30 NOTE — PROGRESS NOTES
" 08/29/18 1200   General Information   Type of Visit Initial OP Ortho PT Evaluation   Start of Care Date 08/29/18   Referring Physician Dr LEÓN Kent   Patient/Family Goals Statement less pain, better motion   Orders Evaluate and Treat   Date of Order 08/09/18   Insurance Type Medicare   Medical Diagnosis L shoulder pain, advanced DJD and likely rotator cuff tears   Surgical/Medical history reviewed Yes   Precautions/Limitations no known precautions/limitations   General Information Comments PMH: see patient chart   Body Part(s)   Body Part(s) Shoulder   Presentation and Etiology   Pertinent history of current problem (include personal factors and/or comorbidities that impact the POC) Patient presents with a year long complaint of L shoulder pain. States he relates this pain to \"joint pain\" vs muscular pain that he had felt prior in his R shoulder, but that went away.  He is not interested in surgery or anything invasive, but he would like to gain painfree mobility and strength in his shoulder so he can use his arm more functionally.  He has no known injury to his L shoulder but notes progressive stiffness and pain. He has difficulty with activites reaching overhead and out to the side. He does a lot of wood working and his pain makes it difficult for him to do so   Impairments A. Pain   Functional Limitations perform activities of daily living   Symptom Location L shoulder   How/Where did it occur From Degenerative Joint Disease;From insidious onset   Onset date of current episode/exacerbation 08/09/18  (physician order date)   Chronicity Chronic   Pain rating (0-10 point scale) Unable to rate   Unable to rate comment States it is moderate   Pain quality A. Sharp   Frequency of pain/symptoms C. With activity   Pain/symptoms are: Worse during the day   Pain/symptoms exacerbated by C. Lifting;D. Carrying;G. Certain positions   Pain/symptoms eased by C. Rest;G. Heat  (Sauna)   Progression of symptoms since onset: " Worsened   Current / Previous Interventions   Diagnostic Tests: (None)   Current Level of Function   Current Community Support Family/friend caregiver   Patient role/employment history F. Retired   Fall Risk Screen   Fall screen completed by PT   Have you fallen 2 or more times in the past year? No   Have you fallen and had an injury in the past year? No   Is patient a fall risk? 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, however not a surgical candidate   Scapulothoracic Rhythm hypo   Pec Minor (supine) Flexibility hypo ++   Shoulder Special Tests Comments Unable to complete special tests due to lack of mobility and pain    Palpation TTP and increased tone in UT, levator, deltoid region and supraspinatus   Accessory Motion/Joint Mobility Hypo all planes of GH mobility and impaired medial scap glide   Left Shoulder Flexion AROM 50   Left Shoulder Flexion PROM 65 pain   Left Shoulder Abduction AROM 35   Left Shoulder Abduction PROM 40 pain   Left Shoulder ER PROM 18   Left Shoulder IR AROM 25 pain   Left Shoulder IR PROM belt line   Left Shoulder Flexion Strength NT   Left Shoulder Abduction Strength mild resistance with pain   Left Shoulder ER Strength mild resistance with pain   Left Shoulder IR Strength mod resistance no pain   Planned Therapy Interventions   Planned Therapy Interventions joint mobilization;manual therapy;neuromuscular re-education;ROM;strengthening;stretching   Planned Modality Interventions   Planned Modality Interventions Comments as needed   Clinical Impression    Criteria for Skilled Therapeutic Interventions Met yes, treatment indicated   PT Diagnosis L shoulder pain, degenerative arthritis and 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   Influenced by the following impairments pain and weakness   Functional limitations due to impairments difficulty performing home tasks   Clinical Presentation Stable/Uncomplicated   Clinical Presentation Rationale due to lack of additional comorbididites that may influence anticipated PT progress   Clinical Decision Making (Complexity) Low complexity   Therapy Frequency 1 time/week   Predicted Duration of Therapy Intervention (days/wks) up to 8 sessions   Risk & Benefits of therapy have been explained Yes   Patient, Family & other staff in agreement with plan of care Yes   Clinical Impression Comments Presentation is consistent with L shoulder pain likely due to degenerative arthritis that is expected to improve some with skilled PT intervention, however the degree may be limited due to the likely failure of his RC and the degree of degeneration   Education Assessment   Preferred Learning Style Demonstration   Barriers to Learning (Memoru)   ORTHO GOALS   PT Ortho Eval Goals 1;2;3   Ortho Goal 1   Goal Identifier STG 1   Goal Description Patient will report overall 30% or more improvement in his L shoulder function/pain in order to perform his home tasks with greater ease   Target Date 09/20/18   Ortho Goal 2   Goal Identifier LTG 1   Goal Description Patient will report 50% or more improvement in his L shoulder discomfort in order to perform home activities with less pain   Target Date 10/25/18   Ortho Goal 3   Goal Identifier LTG 2   Goal Description Patient will improve his SPADI score by 8 points in order to demonstrate meaningful gains in function to perform home tasks for IND   Target Date 10/25/18   Total Evaluation Time   Total Evaluation Time 18   Therapy Certification   Certification date from 08/29/18   Certification date to 10/25/18   Medical  Diagnosis L shoulder pain, degenerative arthritis   I certify the need for these services furnished under this plan of treatment and while under my care. (Physician co-signature of this document indicates review and certification of the therapy plan).

## 2018-09-05 ENCOUNTER — HOSPITAL ENCOUNTER (OUTPATIENT)
Dept: PHYSICAL THERAPY | Facility: HOSPITAL | Age: 83
Setting detail: THERAPIES SERIES
End: 2018-09-05
Attending: FAMILY MEDICINE
Payer: MEDICARE

## 2018-09-05 PROCEDURE — 97140 MANUAL THERAPY 1/> REGIONS: CPT | Mod: GP

## 2018-09-05 PROCEDURE — 97110 THERAPEUTIC EXERCISES: CPT | Mod: GP

## 2018-09-05 PROCEDURE — 40000718 ZZHC STATISTIC PT DEPARTMENT ORTHO VISIT

## 2018-09-11 NOTE — PROGRESS NOTES
SUBJECTIVE:   Mary Irizarry is a 86 year old male who presents to clinic today for the following health issues:      Breathing Problem      Duration: over a week    Description (location/character/radiation): chest    Intensity:  none    Accompanying signs and symptoms: feels like he is having heavy breathing after exhertion. Does not feel SOB. Sometimes wheezing. No chest pains. No symptoms of URI.    History (similar episodes/previous evaluation): None    Precipitating or alleviating factors: None    Therapies tried and outcome: None       He states he can walk length of the [place] without feeling short of breath and currently now sitting he has no shortness of breath.  He has had problems with pedal edema.  Did have a PE is been on Coumadin clinic and his INRs have been stable  [facility]    Mary Irizarry, 86 year old, male presents with   Chief Complaint   Patient presents with     Breathing Problem       PAST MEDICAL HISTORY:  Past Medical History:   Diagnosis Date     Hypertension      Pulmonary embolism (H)        PAST SURGICAL HISTORY:  Past Surgical History:   Procedure Laterality Date     GENITOURINARY SURGERY      prostatectomy       MEDICATIONS:  Prior to Admission medications    Medication Sig Start Date End Date Taking? Authorizing Provider   amLODIPine (NORVASC) 5 MG tablet TAKE 1 TABLET BY MOUTH DAILY WITH DINNER 2/5/18  Yes JABIER Kent MD   Cyanocobalamin (B-12) 1000 MCG CAPS Take 1,000 mcg by mouth daily 4/17/18  Yes Don Geiger MD   doxazosin (CARDURA) 8 MG tablet Take 1 tablet (8 mg) by mouth daily 9/11/17  Yes JABIER Kent MD   folic acid (FOLVITE) 1 MG tablet Take 1 tablet (1 mg) by mouth daily 4/17/18  Yes Don Geiger MD   furosemide (LASIX) 20 MG tablet Take one pill every other day 9/13/18  Yes JABIER Kent MD   warfarin (COUMADIN) 2.5 MG tablet TAKE 1/2 TABLET (1.25MG) ON WEDNESDAYS AND 1 TABLET (2.5MG) ON ALL OTHER DAYS OR AS DIRECTED BY THE  WARFARIN CLINIC 4/10/18  Yes JABIER Kent MD       ALLERGIES:   No Known Allergies    ROS:  Constitutional, HEENT, cardiovascular, pulmonary, gi and gu systems are negative, except as otherwise noted.      EXAM:  /74  Pulse 80  Temp 97.9  F (36.6  C) (Tympanic)  Wt 78.5 kg (173 lb)  SpO2 97%  BMI 27.1 kg/m2 Body mass index is 27.1 kg/(m^2).   GENERAL APPEARANCE: healthy, alert and no distress  EYES: Eyes grossly normal to inspection, PERRL and conjunctivae and sclerae normal  RESP: lungs clear to auscultation - no rales, rhonchi or wheezes  CV: regular rates and rhythm, normal S1 S2, no S3 or S4 and no murmur, click or rub, has trace lower extremity edema  Lab/ X-ray  Results for orders placed or performed in visit on 09/13/18 (from the past 24 hour(s))   INR point of care   Result Value Ref Range    INR Protime 2.6 (A) 0.86 - 1.14       ASSESSMENT/PLAN:    ICD-10-CM    1. Essential hypertension, benign I10    2. Pedal edema R60.0 furosemide (LASIX) 20 MG tablet     Hypertension stable.  He has not been taking Lasix for a few weeks now.  We will plan to have him do a tablet every other day and see him in a month.  He is going to try to get in a gentle exercise program with walking.  Here with his wife questions answered.  INR is therapeutic history of PE.  Today's vitals are stable and sat is good and he has no symptoms sitting here.    HARRIETT Kent MD  September 13, 2018

## 2018-09-12 ENCOUNTER — HOSPITAL ENCOUNTER (OUTPATIENT)
Dept: PHYSICAL THERAPY | Facility: HOSPITAL | Age: 83
Setting detail: THERAPIES SERIES
End: 2018-09-12
Attending: FAMILY MEDICINE
Payer: MEDICARE

## 2018-09-12 PROCEDURE — 97110 THERAPEUTIC EXERCISES: CPT | Mod: GP | Performed by: PHYSICAL THERAPIST

## 2018-09-12 PROCEDURE — 97140 MANUAL THERAPY 1/> REGIONS: CPT | Mod: GP | Performed by: PHYSICAL THERAPIST

## 2018-09-13 ENCOUNTER — OFFICE VISIT (OUTPATIENT)
Dept: FAMILY MEDICINE | Facility: OTHER | Age: 83
End: 2018-09-13
Attending: FAMILY MEDICINE
Payer: MEDICARE

## 2018-09-13 ENCOUNTER — ANTICOAGULATION THERAPY VISIT (OUTPATIENT)
Dept: ANTICOAGULATION | Facility: OTHER | Age: 83
End: 2018-09-13
Attending: FAMILY MEDICINE
Payer: MEDICARE

## 2018-09-13 VITALS
TEMPERATURE: 97.9 F | DIASTOLIC BLOOD PRESSURE: 74 MMHG | WEIGHT: 173 LBS | BODY MASS INDEX: 27.1 KG/M2 | HEART RATE: 80 BPM | SYSTOLIC BLOOD PRESSURE: 128 MMHG | OXYGEN SATURATION: 97 %

## 2018-09-13 DIAGNOSIS — I10 ESSENTIAL HYPERTENSION, BENIGN: Primary | ICD-10-CM

## 2018-09-13 DIAGNOSIS — Z79.01 LONG-TERM (CURRENT) USE OF ANTICOAGULANTS: ICD-10-CM

## 2018-09-13 DIAGNOSIS — R60.0 PEDAL EDEMA: ICD-10-CM

## 2018-09-13 LAB — INR POINT OF CARE: 2.6 (ref 0.86–1.14)

## 2018-09-13 PROCEDURE — G0463 HOSPITAL OUTPT CLINIC VISIT: HCPCS

## 2018-09-13 PROCEDURE — 85610 PROTHROMBIN TIME: CPT | Mod: QW,ZL

## 2018-09-13 PROCEDURE — 99213 OFFICE O/P EST LOW 20 MIN: CPT | Performed by: FAMILY MEDICINE

## 2018-09-13 RX ORDER — FUROSEMIDE 20 MG
TABLET ORAL
Qty: 30 TABLET | Refills: 1 | COMMUNITY
Start: 2018-09-13 | End: 2018-01-01

## 2018-09-13 ASSESSMENT — PAIN SCALES - GENERAL: PAINLEVEL: NO PAIN (0)

## 2018-09-13 ASSESSMENT — ANXIETY QUESTIONNAIRES
5. BEING SO RESTLESS THAT IT IS HARD TO SIT STILL: NOT AT ALL
1. FEELING NERVOUS, ANXIOUS, OR ON EDGE: NOT AT ALL
4. TROUBLE RELAXING: NOT AT ALL
GAD7 TOTAL SCORE: 0
3. WORRYING TOO MUCH ABOUT DIFFERENT THINGS: NOT AT ALL
7. FEELING AFRAID AS IF SOMETHING AWFUL MIGHT HAPPEN: NOT AT ALL
2. NOT BEING ABLE TO STOP OR CONTROL WORRYING: NOT AT ALL
6. BECOMING EASILY ANNOYED OR IRRITABLE: NOT AT ALL

## 2018-09-13 NOTE — MR AVS SNAPSHOT
After Visit Summary   9/13/2018    Mary Irizarry    MRN: [id number]           Patient Information     Date Of Birth          8/27/1932        Visit Information        Provider Department      9/13/2018 1:45 PM JABIER Kent MD [facility]        Today's Diagnoses     Pedal edema           Follow-ups after your visit        Follow-up notes from your care team     Return in about 4 weeks (around 10/11/2018).      Your next 10 appointments already scheduled     Sep 13, 2018  1:45 PM CDT   (Arrive by 1:30 PM)   SHORT with JABIER Kent MD   [facility] ([facility] )    [address]  [place] [address]   [phone]            Sep 19, 2018  2:00 PM CDT   Ortho Treatment with Alejandra Dowell, PTA   [facility] ([facility] )    [address] [place] [address]   [phone]            Sep 26, 2018  2:00 PM CDT   Ortho Treatment with Diana Batista, PT   [facility] ([facility] )    [address] [place] [address]   [phone]            Oct 08, 2018  3:00 PM CDT   LAB with HC LAB   [facility] ([facility] [place] )    [address]  [place] [address]   [phone]            Oct 11, 2018  1:15 PM CDT   Anticoagulation Visit with HC ANTI COAGULATION   [facility] ([facility] [place] )    [address]  [place] [address]   [phone]            Oct 15, 2018  3:00 PM CDT   (Arrive by 2:45 PM)   Return Visit with Don Geiger MD   [facility] ([facility] )    [address]  [place] [address]   [phone]              Who to contact     If you have questions or need follow up information about today's clinic visit or your schedule please contact [facility] directly at [phone].  Normal or non-critical lab and imaging results will be communicated to you by MyChart, letter or  phone within 4 business days after the clinic has received the results. If you do not hear from us within 7 days, please contact the clinic through [place] or phone. If you have a critical or abnormal lab result, we will notify you by phone as soon as possible.  Submit refill requests through [employer] or call your pharmacy and they will forward the refill request to us. Please allow 3 business days for your refill to be completed.          Additional Information About Your Visit        Care EveryWhere ID     This is your Care EveryWhere ID. This could be used by other organizations to access your [facility] medical records  [id number]        Your Vitals Were     Pulse Temperature Pulse Oximetry BMI (Body Mass Index)          80 97.9  F (36.6  C) (Tympanic) 97% 27.1 kg/m2         Blood Pressure from Last 3 Encounters:   09/13/18 128/74   08/09/18 116/74   08/04/18 166/88    Weight from Last 3 Encounters:   09/13/18 173 lb (78.5 kg)   08/09/18 162 lb (73.5 kg)   07/20/18 166 lb (75.3 kg)              Today, you had the following     No orders found for display         Today's Medication Changes          These changes are accurate as of 9/13/18  1:20 PM.  If you have any questions, ask your nurse or doctor.               These medicines have changed or have updated prescriptions.        Dose/Directions    LASIX 20 MG tablet   This may have changed:    - how much to take  - how to take this  - when to take this  - reasons to take this  - additional instructions   Used for:  Pedal edema   Generic drug:  furosemide   Changed by:  JABIER Kent MD        Take one pill every other day   Quantity:  30 tablet   Refills:  1                Primary Care Provider Office Phone # Fax #    JABIER Kent [phone]       [facility] [address]        Equal Access to Services     SUSAN XAVIER AH: Rosales Grace, mary cabezas, delia stewart  rachaeledinimchelle العليAzaleaaan ah. So [facility] [phone].    ATENCIÓN: Si aline rosado, tiene a quiroz disposición servicios gratuitos de asistencia lingüística. Lee al [phone].    We comply with applicable federal civil rights laws and [place] laws. We do not discriminate on the basis of race, color, national origin, age, disability, sex, sexual orientation, or gender identity.            Thank you!     Thank you for choosing [facility]  for your care. Our goal is always to provide you with excellent care. Hearing back from our patients is one way we can continue to improve our services. Please take a few minutes to complete the written survey that you may receive in the mail after your visit with us. Thank you!             Your Updated Medication List - Protect others around you: Learn how to safely use, store and throw away your medicines at www.disposemymeds.org.          This list is accurate as of 9/13/18  1:20 PM.  Always use your most recent med list.                   Brand Name Dispense Instructions for use Diagnosis    amLODIPine 5 MG tablet    NORVASC    30 tablet    TAKE 1 TABLET BY MOUTH DAILY WITH DINNER    Essential hypertension, benign       B-12 1000 MCG Caps     90 capsule    Take 1,000 mcg by mouth daily    Primary hypercoagulable state (H)       doxazosin 8 MG tablet    CARDURA    90 tablet    Take 1 tablet (8 mg) by mouth daily    Essential hypertension, benign       folic acid 1 MG tablet    FOLVITE    90 tablet    Take 1 tablet (1 mg) by mouth daily    Primary hypercoagulable state (H)       LASIX 20 MG tablet   Generic drug:  furosemide     30 tablet    Take one pill every other day    Pedal edema       warfarin 2.5 MG tablet    COUMADIN    110 tablet    TAKE 1/2 TABLET (1.25MG) ON WEDNESDAYS AND 1 TABLET (2.5MG) ON ALL OTHER DAYS OR AS DIRECTED BY THE WARFARIN CLINIC    Long-term (current) use of anticoagulants, Pulmonary embolus (H)

## 2018-09-13 NOTE — PROGRESS NOTES
ANTICOAGULATION FOLLOW-UP CLINIC VISIT    Patient Name:  Mary Irizarry  Date:  9/13/2018  Contact Type:  Face to Face    SUBJECTIVE:     Patient Findings     Positives No Problem Findings           OBJECTIVE    INR Protime   Date Value Ref Range Status   09/13/2018 2.6 (A) 0.86 - 1.14 Final       ASSESSMENT / PLAN  INR assessment THER    Recheck INR In: 4 WEEKS    INR Location Clinic      Anticoagulation Summary as of 9/13/2018     INR goal 2.0-3.0   Today's INR 2.6   Warfarin maintenance plan 2.5 mg (2.5 mg x 1) every day   Full warfarin instructions 2.5 mg every day   Weekly warfarin total 17.5 mg   No change documented Marylou Joshi RN   Plan last modified Marylou Joshi RN (8/23/2018)   Next INR check 10/11/2018   Target end date     Indications   Pulmonary embolism (H) [I26.99]  Long-term (current) use of anticoagulants [Z79.01] [Z79.01]         Anticoagulation Episode Summary     INR check location     Preferred lab     Send INR reminders to [facility] POOL    Comments       Anticoagulation Care Providers     Provider Role Specialty Phone number    JABIER Kent MD [facility] [phone]            See the Encounter Report to view Anticoagulation Flowsheet and Dosing Calendar (Go to Encounters tab in chart review, and find the Anticoagulation Therapy Visit)        Marylou Joshi RN

## 2018-09-13 NOTE — NURSING NOTE
"Chief Complaint   Patient presents with     Breathing Problem       Initial /74  Pulse 90  Temp 97.9  F (36.6  C) (Tympanic)  Wt 173 lb (78.5 kg)  SpO2 97%  BMI 27.1 kg/m2 Estimated body mass index is 27.1 kg/(m^2) as calculated from the following:    Height as of 7/20/18: 5' 7\" (1.702 m).    Weight as of this encounter: 173 lb (78.5 kg).  Medication Reconciliation: complete    Janice Swenson MA    "

## 2018-09-13 NOTE — MR AVS SNAPSHOT
Mary Irizarry   9/13/2018 1:00 PM   Anticoagulation Therapy Visit    Description:  86 year old male   Provider:   ANTI COAGULATION   Department:   Anti Coagulation           INR as of 9/13/2018     Today's INR 2.6      Anticoagulation Summary as of 9/13/2018     INR goal 2.0-3.0   Today's INR 2.6   Full warfarin instructions 2.5 mg every day   Next INR check 10/11/2018    Indications   Pulmonary embolism (H) [I26.99]  Long-term (current) use of anticoagulants [Z79.01] [Z79.01]         Your next Anticoagulation Clinic appointment(s)     Sep 13, 2018  1:00 PM CDT   Anticoagulation Visit with  ANTI COAGULATION   [facility] [place] ([facility] [place] )    [address]  [place] [address]   [phone]            Oct 11, 2018  1:15 PM CDT   Anticoagulation Visit with  ANTI COAGULATION   [facility] [place] ([facility] [place] )    [address]  [place] [address]   [phone]              September 2018 Details    Sun Mon Tue Wed Thu Fri Sat           1                 2               3               4               5               6               7               8                 9               10               11               12               13      2.5 mg   See details      14      2.5 mg         15      2.5 mg           16      2.5 mg         17      2.5 mg         18      2.5 mg         19      2.5 mg         20      2.5 mg         21      2.5 mg         22      2.5 mg           23      2.5 mg         24      2.5 mg         25      2.5 mg         26      2.5 mg         27      2.5 mg         28      2.5 mg         29      2.5 mg           30      2.5 mg                Date Details   09/13 This INR check               How to take your warfarin dose     To take:  2.5 mg Take 1 of the 2.5 mg tablets.           October 2018 Details    Sun Mon Tue Wed Thu Fri Sat      1      2.5 mg         2      2.5 mg         3      2.5 mg         4      2.5 mg         5       2.5 mg         6      2.5 mg           7      2.5 mg         8      2.5 mg         9      2.5 mg         10      2.5 mg         11            12               13                 14               15               16               17               18               19               20                 21               22               23               24               25               26               27                 28               29               30               31                   Date Details   No additional details    Date of next INR:  10/11/2018         How to take your warfarin dose     To take:  2.5 mg Take 1 of the 2.5 mg tablets.

## 2018-09-14 ASSESSMENT — ANXIETY QUESTIONNAIRES: GAD7 TOTAL SCORE: 0

## 2018-09-14 ASSESSMENT — PATIENT HEALTH QUESTIONNAIRE - PHQ9: SUM OF ALL RESPONSES TO PHQ QUESTIONS 1-9: 9

## 2018-09-19 ENCOUNTER — HOSPITAL ENCOUNTER (OUTPATIENT)
Dept: PHYSICAL THERAPY | Facility: HOSPITAL | Age: 83
Setting detail: THERAPIES SERIES
End: 2018-09-19
Attending: FAMILY MEDICINE
Payer: MEDICARE

## 2018-09-19 PROCEDURE — 40000718 ZZHC STATISTIC PT DEPARTMENT ORTHO VISIT

## 2018-09-19 PROCEDURE — 97110 THERAPEUTIC EXERCISES: CPT | Mod: GP

## 2018-09-26 ENCOUNTER — HOSPITAL ENCOUNTER (OUTPATIENT)
Dept: PHYSICAL THERAPY | Facility: HOSPITAL | Age: 83
Setting detail: THERAPIES SERIES
End: 2018-09-26
Attending: FAMILY MEDICINE
Payer: MEDICARE

## 2018-09-26 PROCEDURE — G8986 CARRY D/C STATUS: HCPCS | Mod: GP,CJ | Performed by: PHYSICAL THERAPIST

## 2018-09-26 PROCEDURE — G8985 CARRY GOAL STATUS: HCPCS | Mod: GP,CJ | Performed by: PHYSICAL THERAPIST

## 2018-09-26 PROCEDURE — 97140 MANUAL THERAPY 1/> REGIONS: CPT | Mod: GP | Performed by: PHYSICAL THERAPIST

## 2018-09-26 PROCEDURE — 97110 THERAPEUTIC EXERCISES: CPT | Mod: GP | Performed by: PHYSICAL THERAPIST

## 2018-09-26 NOTE — PROGRESS NOTES
Discharge Summary       09/26/18 1500   Signing Clinician's Name / Credentials   Signing clinician's name / credentials Diana Batista DPT, OCS, Cert DN   Session Number   Session Number 5   Progress Note/Recertification   Progress Note Due Date 10/25/18   Recertification Due Date 10/25/18   PT Medicare Only G-code   G-code Carry: Carrying, Moving & Handling Objects   Carry: Carrying, Moving & Handling Objects   Carry Goal,  (eval/re-eval, every progress note, & discharge) CJ: 20-39% impairment   Carry Discharge Status,  (discharge) CJ: 20-39% impairment   Discharge Carry Modifier Rationale clinical judgment   Adult Goals   PT Ortho Eval Goals 1;2;3   Ortho Goal 1   Goal Identifier STG 1   Goal Description Patient will report overall 30% or more improvement in his L shoulder function/pain in order to perform his home tasks with greater ease   Target Date 09/20/18   Date Met 09/26/18   Ortho Goal 2   Goal Identifier LTG 1   Goal Description Patient will report 50% or more improvement in his L shoulder discomfort in order to perform home activities with less pain   Target Date 10/25/18   Date Met 09/26/18   Ortho Goal 3   Goal Identifier LTG 2   Goal Description Patient will improve his SPADI score by 8 points in order to demonstrate meaningful gains in function to perform home tasks for IND   Target Date (DC)   Subjective Report   Subjective Report States his arm is doing well overall and he is happy with his progress. He will continue on his own and see how it goes. We discussed how working on pulleys and stretching will be most beneficial   Objective Measures   Objective Measures Objective Measure 1   Objective Measure 1   Objective Measure Shoulder ROM   Details Improved shoulder ROM all planes to 90 FLEX, 92 ABD, ER to 55 and IR improved by 4 spinal levels.  Reviewed HEP and patient will continue that at least 1x/day going forward   Treatment Interventions   Interventions Therapeutic  Procedure/Exercise;Manual Therapy   Therapeutic Procedure/exercise   Minutes 10   Skilled Intervention ther ex   Patient Response good   Treatment Detail Pulleys 4 minutes, counter top slides, wall slides, shld extension with wand,SL ER, AAROM shld abd SL.   Manual Therapy   Minutes 15   Skilled Intervention joint mobs and ST   Patient Response good   Treatment Detail GH inf/post glides grade III-IV as tolerated, STM to L shoulder along with progressive stretching to increased mobility into ABD, however still noted crepitus, STM and MFR to lateral shoulder, upper trap and pec minor   Progress Improved motion following   Assessments Completed   Assessments Completed Patient has performed well in his sessions with less pain and increased motion.   Plan   Home program Continue HEP as previously prescribed   Plan for next session DC today   Total Session Time   Timed Code Treatment Minutes 25   Total Treatment Time (sum of timed and untimed services) 25

## 2018-10-03 NOTE — TELEPHONE ENCOUNTER
Please offer patient next available   appointment with provider or offer patient to see another provider. Unable to see patient today or advise ER/Urgernt care if symptoms get worse.  Karla Prado, CMA

## 2018-10-03 NOTE — TELEPHONE ENCOUNTER
8:44 AM    Reason for Call: OVERBOOK     Patient is having the following symptoms: breathing follow up  for 1 months.    The patient is requesting an appointment for ASAP  with Dr.Jon Kent.    Was an appointment offered for this call? No  If yes : Appointment type... He has one on 10/16/18 and would like to try to get in sooner than that               Date    Preferred method for responding to this message: Telephone Call  What is your phone number ?[phone]    If we cannot reach you directly, may we leave a detailed response at the number you provided? Yes    Can this message wait until your PCP/provider returns, if unavailable today? No, pcp is out     Yulissa John

## 2018-10-05 NOTE — PROGRESS NOTES
SUBJECTIVE:   Mary Irizarry is a 86 year old male who presents to clinic today for the following health issues:      Heart Failure Follow-up    Symptoms:    Shortness of breath: happens with rest and exertion - stable but is very heavy breathing all the time    Lower extremity edema: worsened from baseline. Was only in the ankle but is now also going all the way up to the knees    Chest pain: No    Using more pillows than normal: No    Cough at night: No    Weight:    Checking weight daily: No    Weight change: none    Cardiology visits, ER/UC, or hospital admissions since last visit: None    Medication side effects: none      Amount of exercise or physical activity: None    Problems taking medications regularly: No    Medication side effects: none    Diet: regular (no restrictions)            Problem list and histories reviewed & adjusted, as indicated.  Additional history: as documented    Patient Active Problem List   Diagnosis     Facial lesion     Left inguinal hernia     ACP (advance care planning)     Lesion of left pinna     Essential hypertension, benign     Pulmonary embolism (H)     Hypoxia     Elevated troponin I level     Pulmonary embolism, bilateral (H)     Acute respiratory failure with hypoxia (H)     Long-term (current) use of anticoagulants [Z79.01]     Diastolic dysfunction     Past Surgical History:   Procedure Laterality Date     GENITOURINARY SURGERY      prostatectomy       Social History   Substance Use Topics     Smoking status: Former Smoker     Smokeless tobacco: Never Used      Comment: 2 mos while in high school.      Alcohol use 0.0 oz/week     0 Standard drinks or equivalent per week      Comment: occa     Family History   Problem Relation Age of Onset     Hypertension Mother      Unknown/Adopted Father      No Known Problems Maternal Grandmother      No Known Problems Maternal Grandfather      No Known Problems Paternal Grandmother      No Known Problems Paternal Grandfather       No Known Problems Sister      No Known Problems Son      No Known Problems Son      Deep Vein Thrombosis (DVT) Son          Current Outpatient Prescriptions   Medication Sig Dispense Refill     Cyanocobalamin (B-12) 1000 MCG CAPS Take 1,000 mcg by mouth daily 90 capsule 3     doxazosin (CARDURA) 8 MG tablet Take 1 tablet (8 mg) by mouth daily 90 tablet 3     folic acid (FOLVITE) 1 MG tablet Take 1 tablet (1 mg) by mouth daily 90 tablet 3     furosemide (LASIX) 20 MG tablet Take 1 tablet (20 mg) by mouth daily 30 tablet 3     lisinopril (PRINIVIL/ZESTRIL) 10 MG tablet Take 1 tablet (10 mg) by mouth daily 30 tablet 1     warfarin (COUMADIN) 2.5 MG tablet TAKE 1/2 TABLET (1.25MG) ON WEDNESDAYS AND 1 TABLET (2.5MG) ON ALL OTHER DAYS OR AS DIRECTED BY THE [facility] 110 tablet 3     [DISCONTINUED] furosemide (LASIX) 20 MG tablet Take 1 tablet (20 mg) by mouth daily Take one pill every other day 30 tablet 5     [DISCONTINUED] furosemide (LASIX) 20 MG tablet Take one pill every other day 30 tablet 1     No Known Allergies  BP Readings from Last 3 Encounters:   10/09/18 108/72   09/13/18 128/74   08/09/18 116/74    Wt Readings from Last 3 Encounters:   10/09/18 176 lb (79.8 kg)   09/13/18 173 lb (78.5 kg)   08/09/18 162 lb (73.5 kg)                    Reviewed and updated as needed this visit by clinical staff       Reviewed and updated as needed this visit by Provider         ROS:  Constitutional, HEENT, cardiovascular, pulmonary, GI, , musculoskeletal, neuro, skin, endocrine and psych systems are negative, except as otherwise noted.    OBJECTIVE:     /72  Pulse 89  Temp 97  F (36.1  C) (Tympanic)  Wt 176 lb (79.8 kg)  SpO2 92%  BMI 27.57 kg/m2  Body mass index is 27.57 kg/(m^2).  GENERAL: healthy, alert and no distress  EYES: Eyes grossly normal to inspection, PERRL and conjunctivae and sclerae normal  HENT:  nose and mouth without ulcers or lesions  NECK: no adenopathy, no asymmetry, masses, or scars  and thyroid normal to palpation  RESP: lungs clear to auscultation - no rales, rhonchi or wheezes  CV: regular rate and rhythm, normal S1 S2, no S3 or S4, no murmur, click or rub, no peripheral edema and peripheral pulses strong  ABDOMEN: soft, nontender, no hepatosplenomegaly, no masses and bowel sounds normal  MS: no gross musculoskeletal defects noted, has edema up to his thighs and also some scrotal swelling and penile swelling but no sign of infection in the groin  SKIN: no suspicious lesions or rashes  NEURO: Normal strength and tone, mentation intact and speech normal  PSYCH: mentation appears at his baseline , affect normal    Diagnostic Test Results:  BNP pending  Results for orders placed or performed in visit on 10/08/18   CBC with platelets differential   Result Value Ref Range    WBC 6.8 4.0 - 11.0 10e9/L    RBC Count 4.57 4.4 - 5.9 10e12/L    Hemoglobin 11.3 (L) 13.3 - 17.7 g/dL    Hematocrit 36.5 (L) 40.0 - 53.0 %    MCV 80 78 - 100 fl    MCH 24.7 (L) 26.5 - 33.0 pg    MCHC 31.0 (L) 31.5 - 36.5 g/dL    RDW 15.9 (H) 10.0 - 15.0 %    Platelet Count 203 150 - 450 10e9/L    Diff Method Automated Method     % Neutrophils 59.4 %    % Lymphocytes 30.4 %    % Monocytes 8.7 %    % Eosinophils 1.0 %    % Basophils 0.4 %    % Immature Granulocytes 0.1 %    Nucleated RBCs 0 0 /100    Absolute Neutrophil 4.0 1.6 - 8.3 10e9/L    Absolute Lymphocytes 2.1 0.8 - 5.3 10e9/L    Absolute Monocytes 0.6 0.0 - 1.3 10e9/L    Absolute Eosinophils 0.1 0.0 - 0.7 10e9/L    Absolute Basophils 0.0 0.0 - 0.2 10e9/L    Abs Immature Granulocytes 0.0 0 - 0.4 10e9/L    Absolute Nucleated RBC 0.0    Comprehensive metabolic panel   Result Value Ref Range    Sodium 142 133 - 144 mmol/L    Potassium 3.9 3.4 - 5.3 mmol/L    Chloride 109 94 - 109 mmol/L    Carbon Dioxide 28 20 - 32 mmol/L    Anion Gap 5 3 - 14 mmol/L    Glucose 91 70 - 99 mg/dL    Urea Nitrogen 21 7 - 30 mg/dL    Creatinine 1.02 0.66 - 1.25 mg/dL    GFR Estimate 69 >60  mL/min/1.7m2    GFR Estimate If Black 84 >60 mL/min/1.7m2    Calcium 8.4 (L) 8.5 - 10.1 mg/dL    Bilirubin Total 0.7 0.2 - 1.3 mg/dL    Albumin 3.4 3.4 - 5.0 g/dL    Protein Total 7.6 6.8 - 8.8 g/dL    Alkaline Phosphatase 71 40 - 150 U/L    ALT 34 0 - 70 U/L    AST 19 0 - 45 U/L   Lactate Dehydrogenase   Result Value Ref Range    Lactate Dehydrogenase 238 (H) 85 - 227 U/L   D-Dimer (HI,GH)   Result Value Ref Range    D-Dimer ng/mL 249 0 - 300 ng/ml D-DU       ASSESSMENT/PLAN:               ICD-10-CM    1. Acute respiratory failure with hypoxia (H) J96.01    2. Need for prophylactic vaccination and inoculation against influenza Z23 HC FLU VACCINE, INCREASED ANTIGEN, PRESV FREE     Vaccine Administration, Initial [42566]   3. Diastolic dysfunction I51.9 CARDIOLOGY EVAL ADULT REFERRAL     N terminal pro BNP outpatient     lisinopril (PRINIVIL/ZESTRIL) 10 MG tablet   4. Dyspnea on exertion  R06.09 N terminal pro BNP outpatient   5. Pedal edema R60.0 furosemide (LASIX) 20 MG tablet       He has bilateral lower extremity edema.  His recent d-dimer is negative as is see in oncology.  Does have diastolic dysfunction noted on echo.  Will increase his Lasix from 20 mg every other day to 20 mg daily.  BNP will be checked today.  We will stop the amlodipine and switch him over to lisinopril.  Cardiology consult made.  Flu shot given today    R Uriel Kent MD  [facility] [place]    Injectable Influenza Immunization Documentation    1.  Is the person to be vaccinated sick today?   No    2. Does the person to be vaccinated have an allergy to a component   of the vaccine?   No  Egg Allergy Algorithm Link    3. Has the person to be vaccinated ever had a serious reaction   to influenza vaccine in the past?   No    4. Has the person to be vaccinated ever had Guillain-Barré syndrome?   No    Form completed by Janice Swenson

## 2018-10-09 NOTE — MR AVS SNAPSHOT
After Visit Summary   10/9/2018    Mary Irizarry    MRN: [id number]           Patient Information     Date Of Birth          8/27/1932        Visit Information        Provider Department      10/9/2018 10:00 AM JABIER Kent MD [facility] [place]        Today's Diagnoses     Acute respiratory failure with hypoxia (H)    -  1    Need for prophylactic vaccination and inoculation against influenza        Diastolic dysfunction        Dyspnea on exertion         Pedal edema           Follow-ups after your visit        Additional Services     CARDIOLOGY EVAL ADULT REFERRAL       Preferred location:  Replaced by [facility] [phone] [email or web address]    Please be aware that coverage of these services is subject to the terms and limitations of your health insurance plan.  Call member services at your health plan with any benefit or coverage questions.      Please bring the following to your appointment:  Any x-rays, CTs or MRIs which have been performed. Contact the facility where they were done to arrange for  prior to your scheduled appointment.    List of current medications  This referral request   Any documents/labs given to you for this referral                  Your next 10 appointments already scheduled     Oct 11, 2018  1:15 PM CDT   Anticoagulation Visit with HC ANTI COAGULATION   [facility] [place] ([facility] [place] )    [address]  [place] [address]   [phone]            Oct 15, 2018  3:00 PM CDT   (Arrive by 2:45 PM)   Return Visit with Don Geiger MD   [facility] [place] ([facility] [place] )    [address]  [place] [address]   [phone]            Oct 22, 2018  3:00 PM CDT   (Arrive by 2:45 PM)   New Visit with Preston Bryant DO   [facility] [place] ([facility] [place] )    [address]  [place] [address]   [phone]              Who to contact     If you have questions or need follow up information about today's clinic visit or your schedule please contact [facility] [place] directly at [phone].  Normal or non-critical lab and imaging results will be communicated to you by MyChart, letter or phone within 4 business days after the clinic has received the results. If you do not hear from us within 7 days, please contact the clinic through MyChart or phone. If you have a critical or abnormal lab result, we will notify you by phone as soon as possible.  Submit refill requests through [employer] or call your pharmacy and they will forward the refill request to us. Please allow 3 business days for your refill to be completed.          Additional Information About Your Visit        Care EveryWhere ID     This is your Care EveryWhere ID. This could be used by other organizations to access your [facility] medical records  [id number]        Your Vitals Were     Pulse Temperature Pulse Oximetry BMI (Body Mass Index)          89 97  F (36.1  C) (Tympanic) 92% 27.57 kg/m2         Blood Pressure from Last 3 Encounters:   10/09/18 108/72   09/13/18 128/74   08/09/18 116/74    Weight from Last 3 Encounters:   10/09/18 176 lb (79.8 kg)   09/13/18 173 lb (78.5 kg)   08/09/18 162 lb (73.5 kg)              We Performed the Following     CARDIOLOGY EVAL ADULT REFERRAL     HC FLU VACCINE, INCREASED ANTIGEN, PRESV FREE     N terminal pro BNP outpatient     Vaccine Administration, Initial [36406]          Today's Medication Changes          These changes are accurate as of 10/9/18 10:23 AM.  If you have any questions, ask your nurse or doctor.               Start taking these medicines.        Dose/Directions    lisinopril 10 MG tablet   Commonly known as:  PRINIVIL/ZESTRIL   Used for:  Diastolic dysfunction   Started by:  JABIER Kent MD        Dose:  10 mg   Take 1 tablet (10 mg) by mouth  daily   Quantity:  30 tablet   Refills:  1         These medicines have changed or have updated prescriptions.        Dose/Directions    furosemide 20 MG tablet   Commonly known as:  LASIX   This may have changed:    - how much to take  - how to take this  - when to take this  - additional instructions   Used for:  Pedal edema   Changed by:  JABIER Kent MD        Dose:  20 mg   Take 1 tablet (20 mg) by mouth daily   Quantity:  30 tablet   Refills:  3         Stop taking these medicines if you haven't already. Please contact your care team if you have questions.     amLODIPine 5 MG tablet   Commonly known as:  NORVASC   Stopped by:  JABIER Kent MD                Where to get your medicines      These medications were sent to [facility] [place] [place] - [address]  [address] [address]     Phone:  [phone]     furosemide 20 MG tablet    lisinopril 10 MG tablet                Primary Care Provider Office Phone # Fax #    JABIER Kent [phone] [address] [address]        Equal Access to Services     [facility]: Hadii aad ku hadasho Soomaali, waaxda luqadaha, qaybta kaalmada adeegyada, waxay carmenin hayarpita perera . So [facility] [phone].    ATENCIÓN: Si habla español, tiene a quiroz disposición servicios gratuitos de asistencia lingüística. Llame al [phone].    We comply with applicable federal civil rights laws and [place] laws. We do not discriminate on the basis of race, color, national origin, age, disability, sex, sexual orientation, or gender identity.            Thank you!     Thank you for choosing [facility]  for your care. Our goal is always to provide you with excellent care. Hearing back from our patients is one way we can continue to improve our services. Please take a few minutes to complete the written survey that you may receive in the mail after your visit with us. Thank  you!             Your Updated Medication List - Protect others around you: Learn how to safely use, store and throw away your medicines at www.disposemymeds.org.          This list is accurate as of 10/9/18 10:23 AM.  Always use your most recent med list.                   Brand Name Dispense Instructions for use Diagnosis    B-12 1000 MCG Caps     90 capsule    Take 1,000 mcg by mouth daily    Primary hypercoagulable state (H)       doxazosin 8 MG tablet    CARDURA    90 tablet    Take 1 tablet (8 mg) by mouth daily    Essential hypertension, benign       folic acid 1 MG tablet    FOLVITE    90 tablet    Take 1 tablet (1 mg) by mouth daily    Primary hypercoagulable state (H)       furosemide 20 MG tablet    LASIX    30 tablet    Take 1 tablet (20 mg) by mouth daily    Pedal edema       lisinopril 10 MG tablet    PRINIVIL/ZESTRIL    30 tablet    Take 1 tablet (10 mg) by mouth daily    Diastolic dysfunction       warfarin 2.5 MG tablet    COUMADIN    110 tablet    TAKE 1/2 TABLET (1.25MG) ON WEDNESDAYS AND 1 TABLET (2.5MG) ON ALL OTHER DAYS OR AS DIRECTED BY THE [facility]    Long-term (current) use of anticoagulants, Pulmonary embolus (H)

## 2018-10-09 NOTE — NURSING NOTE
"Chief Complaint   Patient presents with     Heart Failure     increased breathing problems       Initial /72  Pulse 89  Temp 97  F (36.1  C) (Tympanic)  Wt 176 lb (79.8 kg)  SpO2 92%  BMI 27.57 kg/m2 Estimated body mass index is 27.57 kg/(m^2) as calculated from the following:    Height as of 7/20/18: 5' 7\" (1.702 m).    Weight as of this encounter: 176 lb (79.8 kg).  Medication Reconciliation: complete    Janice Swenson MA    "

## 2018-10-15 NOTE — MR AVS SNAPSHOT
Mary Harleyedgar   10/15/2018 2:00 PM   Anticoagulation Therapy Visit    Description:  86 year old male   Provider:   ANTI COAGULATION   Department:  Hc Anti Coagulation           INR as of 10/15/2018     Today's INR 3.9!      Anticoagulation Summary as of 10/15/2018     INR goal 2.0-3.0   Today's INR 3.9!   Full warfarin instructions 10/15: Hold; 10/16: 1.25 mg; Otherwise 2.5 mg every day   Next INR check 11/5/2018    Indications   Pulmonary embolism (H) [I26.99]  Long-term (current) use of anticoagulants [Z79.01] [Z79.01]         Your next Anticoagulation Clinic appointment(s)     Nov 05, 2018  1:00 PM CST   Anticoagulation Visit with  ANTI COAGULATION   [facility] ([facility] [place] )    [address] [place] [address]   [phone]              October 2018 Details    Sun Mon Tue Wed Thu Fri Sat      1               2               3               4               5               6                 7               8               9               10               11               12               13                 14               15      Hold   See details      16      1.25 mg         17      2.5 mg         18      2.5 mg         19      2.5 mg         20      2.5 mg           21      2.5 mg         22      2.5 mg         23      2.5 mg         24      2.5 mg         25      2.5 mg         26      2.5 mg         27      2.5 mg           28      2.5 mg         29      2.5 mg         30      2.5 mg         31      2.5 mg             Date Details   10/15 This INR check               How to take your warfarin dose     To take:  1.25 mg Take 0.5 of a 2.5 mg tablet.    To take:  2.5 mg Take 1 of the 2.5 mg tablets.    Hold Do not take your warfarin dose. See the Details table to the right for additional instructions.                November 2018 Details    Sun Mon Tue Wed Thu Fri Sat         1      2.5 mg         2      2.5 mg         3      2.5 mg           4      2.5  mg         5            6               7               8               9               10                 11               12               13               14               15               16               17                 18               19               20               21               22               23               24                 25               26               27               28               29               30                 Date Details   No additional details    Date of next INR:  11/5/2018         How to take your warfarin dose     To take:  2.5 mg Take 1 of the 2.5 mg tablets.

## 2018-10-15 NOTE — PROGRESS NOTES
ANTICOAGULATION FOLLOW-UP CLINIC VISIT    Patient Name:  Mary Irizarry  Date:  10/15/2018  Contact Type:  Face to Face    SUBJECTIVE:     Patient Findings     Comments Decreased vit K intake. He will increase this           OBJECTIVE    INR Protime   Date Value Ref Range Status   10/15/2018 3.9 (A) 0.86 - 1.14 Final       ASSESSMENT / PLAN  INR assessment SUPRA decreased vit K intake   Recheck INR In: 3 WEEKS    INR Location Clinic      Anticoagulation Summary as of 10/15/2018     INR goal 2.0-3.0   Today's INR 3.9!   Warfarin maintenance plan 2.5 mg (2.5 mg x 1) every day   Full warfarin instructions 10/15: Hold; 10/16: 1.25 mg; Otherwise 2.5 mg every day   Weekly warfarin total 17.5 mg   Plan last modified Marylou Joshi RN (8/23/2018)   Next INR check 11/5/2018   Target end date     Indications   Pulmonary embolism (H) [I26.99]  Long-term (current) use of anticoagulants [Z79.01] [Z79.01]         Anticoagulation Episode Summary     INR check location     Preferred lab     Send INR reminders to  LIANG POOL    Comments       Anticoagulation Care Providers     Provider Role Specialty Phone number    JABIER Kent MD [facility] [phone]            See the Encounter Report to view Anticoagulation Flowsheet and Dosing Calendar (Go to Encounters tab in chart review, and find the Anticoagulation Therapy Visit)        Marylou Joshi RN

## 2018-10-15 NOTE — NURSING NOTE
"Chief Complaint   Patient presents with     RECHECK     Pt is here for a f/u hypercoagulable state.       Initial /60 (BP Location: Right arm, Cuff Size: Adult Regular)  Pulse 81  Temp 96.6  F (35.9  C) (Tympanic)  Resp 17  Ht 1.702 m (5' 7\")  Wt 79.2 kg (174 lb 9.6 oz)  SpO2 96%  BMI 27.35 kg/m2 Estimated body mass index is 27.35 kg/(m^2) as calculated from the following:    Height as of this encounter: 1.702 m (5' 7\").    Weight as of this encounter: 79.2 kg (174 lb 9.6 oz).  Medication Reconciliation: complete    aKci Sabillon LPN    "

## 2018-10-15 NOTE — MR AVS SNAPSHOT
After Visit Summary   10/15/2018    Mary Irizarry    MRN: [id number]           Patient Information     Date Of Birth          8/27/1932        Visit Information        Provider Department      10/15/2018 3:00 PM Don Geiger MD [facility]        Care Instructions     Follow up with your Primary Physician with any concerns.          Follow-ups after your visit        Your next 10 appointments already scheduled     Oct 22, 2018  3:00 PM CDT   (Arrive by 2:45 PM)   New Visit with Preston Bryant,    [facility] [place] ([facility] [place] )    [address]  [place] [address]   [phone]            Nov 05, 2018  1:00 PM CST   Anticoagulation Visit with HC ANTI COAGULATION   [facility] [place] ([facility] [place] )    [address]  [place] [address]   [phone]              Who to contact     If you have questions or need follow up information about today's clinic visit or your schedule please contact [facility] directly at [phone].  Normal or non-critical lab and imaging results will be communicated to you by MyChart, letter or phone within 4 business days after the clinic has received the results. If you do not hear from us within 7 days, please contact the clinic through MyChart or phone. If you have a critical or abnormal lab result, we will notify you by phone as soon as possible.  Submit refill requests through [employer] or call your pharmacy and they will forward the refill request to us. Please allow 3 business days for your refill to be completed.          Additional Information About Your Visit        Care EveryWhere ID     This is your Care EveryWhere ID. This could be used by other organizations to access your [place] medical records  [id number]        Your Vitals Were     Pulse Temperature Respirations Height Pulse Oximetry BMI (Body Mass Index)    81 96.6  F  "(35.9  C) (Tympanic) 17 1.702 m (5' 7\") 96% 27.35 kg/m2       Blood Pressure from Last 3 Encounters:   10/15/18 102/60   10/09/18 108/72   09/13/18 128/74    Weight from Last 3 Encounters:   10/15/18 79.2 kg (174 lb 9.6 oz)   10/09/18 79.8 kg (176 lb)   09/13/18 78.5 kg (173 lb)              Today, you had the following     No orders found for display       Primary Care Provider Office Phone # Fax #    R Uriel Kent [phone]       [facility] [facility] [address]        Equal Access to Services     SUSAN XAVIER : Rosales bangurao Sojocelin, waaxda luqadaha, qaybta kaalmada adeegyada, delia perera . So [facility] [phone].    ATENCIÓN: Si habla español, tiene a quiroz disposición servicios gratuitos de asistencia lingüística. [facility] [phone].    We comply with applicable federal civil rights laws and [place] laws. We do not discriminate on the basis of race, color, national origin, age, disability, sex, sexual orientation, or gender identity.            Thank you!     Thank you for choosing [facility]  for your care. Our goal is always to provide you with excellent care. Hearing back from our patients is one way we can continue to improve our services. Please take a few minutes to complete the written survey that you may receive in the mail after your visit with us. Thank you!             Your Updated Medication List - Protect others around you: Learn how to safely use, store and throw away your medicines at www.disposemymeds.org.          This list is accurate as of 10/15/18  3:13 PM.  Always use your most recent med list.                   Brand Name Dispense Instructions for use Diagnosis    B-12 1000 MCG Caps     90 capsule    Take 1,000 mcg by mouth daily    Primary hypercoagulable state (H)       doxazosin 8 MG tablet    CARDURA    90 tablet    Take 1 tablet (8 mg) by mouth daily    Essential hypertension, benign       folic " acid 1 MG tablet    FOLVITE    90 tablet    Take 1 tablet (1 mg) by mouth daily    Primary hypercoagulable state (H)       furosemide 20 MG tablet    LASIX    30 tablet    Take 1 tablet (20 mg) by mouth daily    Pedal edema       lisinopril 10 MG tablet    PRINIVIL/ZESTRIL    30 tablet    Take 1 tablet (10 mg) by mouth daily    Diastolic dysfunction       warfarin 2.5 MG tablet    COUMADIN    110 tablet    TAKE 1/2 TABLET (1.25MG) ON WEDNESDAYS AND 1 TABLET (2.5MG) ON ALL OTHER DAYS OR AS DIRECTED BY THE [facility]    Long-term (current) use of anticoagulants, Pulmonary embolus (H)

## 2018-10-16 NOTE — PROGRESS NOTES
Visit Date:   10/15/2018      HISTORY OF PRESENT ILLNESS:  Mr. 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  We had seen the patient at the request of Dr. Uriel Kent on 04/09/2018.  At that time Mr. Irizarry was an 85-year-old white male with previous history of hypertension we were asked to evaluate concerning a new diagnosis of bilateral pulmonary emboli and bilateral DVT.  Apparently the patient presented with shortness of breath to the emergency room 01/10/2018.  At that time a CT chest came back consistent with bilateral pulmonary emboli.  He also had bilateral lower extremity DVTs.  The patient underwent venous Dopplers and the findings were the patient had a right lower extremity deep venous thrombosis within the common femoral vein, proximal deep femoral vein and the right external iliac vein and also a clot was seen within the superficial femoral vein, popliteal vein, posterior tibial veins of the left lower extremity consistent with bilateral DVTs.  The patient was admitted and started on Lovenox and transitioned to Coumadin.  He was noted to have an elevated troponin level and this was thought not to be due to cardiac etiology.  Initially treated with aspirin and Plavix, which was discontinued.  Now he was referred to us for further evaluation of hypocoagulable state.  The patient did describe a remote history of prostate being removed; it was unclear if he had prostate cancer.  His last PSA was normal at 0.27.  When we saw the patient we wanted to rule out hypocoagulable state and workup was performed including factor V Leiden, prothrombin gene mutation; this was negative.  Antiphospholipid profile was negative.  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  He also had a CT and pelvis which was essentially negative except for left inguinal hernia with fat contained in the sac, otherwise, no other complaints.  When we saw the  patient we felt that the patient had a hypocoagulable state with new onset bilateral lower extremity DVT and bilateral pulmonary emboli.  We felt he was homozygous with the MTHFR DNA gene mutation with homozygous state of the C677T locus and we recommend the patient be on lifelong Coumadin and also proceed with B6, B12 and folic acid supplementation.  The patient otherwise has continued on Coumadin.  Most recently he had seen Dr. Kent for shortness of breath.  His BNP was elevated consistent with CHF.  The patient has been referred to Dr. Bryant of Cardiology who will see the patient next Monday, which is 10/22, with major complaints that he gets dyspnea on exertion, has been more fatigued than usual but otherwise denies any leg swelling, chest pain.  His D-dimer is normal.      PHYSICAL EXAMINATION:     GENERAL:  He is an elderly white male in no acute distress.   VITAL SIGNS:  Reveal blood pressure 102/60, pulse 81, respirations 17, temperature 96.6.   HEENT:  Atraumatic, normocephalic.  Oropharynx is erythematous.   NECK:  Supple.   LUNGS:  Clear to auscultation and percussion.   HEART:  Regular rhythm, S1, S2 normal.   ABDOMEN:  Soft.  Normoactive bowel sounds.  No masses.  Nontender.   LYMPHATICS:  No cervical, supraclavicular, axillary, inguinal nodes.   EXTREMITIES:  With trace to 1+ ankle edema.   NEUROLOGIC:  Nonfocal.      LABORATORY DATA:  CBC:  White count 6.8, H and H 11.3 and 36.5 platelet count 203.  BUN is 21, creatinine 1.02.  LFTs are normal.  LDH is 238.  D-dimer is 249.  N-terminal proBNP is 2114.        IMPRESSION:  New onset bilateral lower extremity DVT and bilateral pulmonary emboli, likely due to hypocoagulable state, i.e., homozygous MTHFR DNA mutation of homozygous state with C677T locus.  The patient will need to be on lifelong Coumadin.  Patient was started on B6, B12, folic acid supplementation.  His homocysteine level was normal.  His D-dimer is normal.  Plan is to continue p.r.n.  followup; he will follow up with Dr. Kent and Dr. Bryant concerning new diagnosis of CHF, otherwise I will see the patient on a p.r.n. basis.  He will continue to be on lifelong Coumadin.         KHLOE GRIMM MD             D: 10/15/2018   T: 10/15/2018   MT: TJ      Name:     SUSHMA LIANG   MRN:      [id number]        Account:      [id number]   :      1932           Visit Date:   10/15/2018      Document: [id number]       cc: Preston Nunez MD

## 2018-10-22 PROBLEM — I48.91 ON COUMADIN FOR ATRIAL FIBRILLATION (H): Status: ACTIVE | Noted: 2018-01-01

## 2018-10-22 PROBLEM — Z91.89 SEDENTARY LIFESTYLE: Status: ACTIVE | Noted: 2018-01-01

## 2018-10-22 PROBLEM — I48.91 NEW ONSET A-FIB (H): Status: ACTIVE | Noted: 2018-01-01

## 2018-10-22 PROBLEM — R60.0 LOCALIZED EDEMA: Status: ACTIVE | Noted: 2018-01-01

## 2018-10-22 PROBLEM — R53.83 OTHER FATIGUE: Status: ACTIVE | Noted: 2018-01-01

## 2018-10-22 PROBLEM — R60.0 PEDAL EDEMA: Status: ACTIVE | Noted: 2018-01-01

## 2018-10-22 PROBLEM — Z79.01 ON COUMADIN FOR ATRIAL FIBRILLATION (H): Status: ACTIVE | Noted: 2018-01-01

## 2018-10-22 PROBLEM — I51.7 LAE (LEFT ATRIAL ENLARGEMENT): Status: ACTIVE | Noted: 2018-01-01

## 2018-10-22 PROBLEM — N18.2 CHRONIC KIDNEY DISEASE, STAGE 2 (MILD): Status: ACTIVE | Noted: 2018-01-01

## 2018-10-22 NOTE — PROGRESS NOTES
[facility]   CARDIOLOGY CONSULT     Mary Irizarry   8/27/1932  [id number]    JABIER Kent     Chief Complaint   Patient presents with     Consult     Referral Dr. Uriel Kent- Diastolic dysfunction;            HPI:   Mr. Irizarry is a 86-year-old gentleman who is being seen by cardiology for diastolic dysfunction. He has been complaining of fatigue for the last 2 weeks and was found to be a new onset atrial fibrillation with controlled rates at today's visit.  He also has a history of hypertension, bilateral PEs with a CT scan 1/10/18, history of tobacco abuse, hypoxia, diastolic dysfunction, fatigue times 2 weeks, and lower extremity edema.     Mary was seen in the clinic on 10/9/18 for shortness of breath with activity and rest. He was felt to be stable but was noted to have very heavy breathing. He was found to have significant lower extremity edema which is worse from baseline. The swelling was described as extending to his scrotum. He has not any chest pain, chest tightness, or chest discomfort. He denies orthopnea or PND.  He was noted to have bilateral lower extremity edema with essentially a normal d-dimer.  The Lasix was increased from 20 mg every other day to 20 mg daily.  A BNP was checked on 10/8/18 and was 2114.  As result, he was referred to cardiology.    As noted, he describes having significant swelling to his legs. The swelling extended to his scrotum. With the increase of Lasix, he seen a decline in his swelling but the swelling persists. He lives a rather sedentary lifestyle. We discussed treatment availability that is out there which are limited.  He is aware that he needs to work on his activity and manage his weight. We also discussed the underlying process with underlying etiologies.    He also complains of fatigue today.  He was found to be in age fibrillation which likely explains his fatigue this has been going on for approximately 2 weeks. His wife does not feel like he snores.  But he does fall asleep easily throughout the day. His EKG today shows atrial fibrillation with controlled rates at 75 bpm.  his is a new finding.  He is currently on Coumadin but is not on any rate controlling medications. Briefly, we discussed medications, cardioversion and ablation.  As mentioned below.  He would like to try medical management at this point.      He also has a history of PEs.  He had a CT scan of his chest and was found to have bilateral PEs on 1/10/18.  He is currently on Coumadin and remains on Coumadin.      He has a history of hypertension. He is currently on Lasix 20 mg daily, doxazosin 8 mg daily, and lisinopril 10 mg daily.              IMAGING RESULTS:   ECHo completed on 1/11/18.  Interpretation Summary  No pericardial effusion is present.  Left ventricular size is normal.  The Ejection Fraction is estimated at 55-60%.  Grade I or early diastolic dysfunction.  Borderline right ventricular enlargement.  Mild left atrial enlargement is present.  Trace mitral insufficiency is present.  The aortic valve is tricuspid.  Trace to mild aortic insufficiency is present.  The mean gradient across the aortic valve is4.4 mmHg.  The tricuspid valve is normal.  The peak velocity of the tricuspid regurgitant jet is not obtainable.    ALLERGIES:   No Known Allergies     PAST MEDICAL HISTORY:   No past medical history on file.     PAST SURGICAL HISTORY:   Past Surgical History:   Procedure Laterality Date     GENITOURINARY SURGERY      prostatectomy        FAMILY HISTORY:   Family History   Problem Relation Age of Onset     Hypertension Mother      Unknown/Adopted Father      No Known Problems Maternal Grandmother      No Known Problems Maternal Grandfather      No Known Problems Paternal Grandmother      No Known Problems Paternal Grandfather      No Known Problems Sister      No Known Problems Son      No Known Problems Son      Deep Vein Thrombosis (DVT) Son         SOCIAL HISTORY:   Social History      Social History     Marital status:      Spouse name: N/A     Number of children: N/A     Years of education: N/A     Social History Main Topics     Smoking status: Former Smoker     Smokeless tobacco: Never Used      Comment: 2 mos while in high school.      Alcohol use 0.0 oz/week     0 Standard drinks or equivalent per week      Comment: occa     Drug use: No     Sexual activity: Not Asked     Other Topics Concern     Parent/Sibling W/ Cabg, Mi Or Angioplasty Before 65f 55m? No     Social History Narrative         CURRENT MEDICATIONS:   Prior to Admission medications    Medication Sig Start Date End Date Taking? Authorizing Provider   Cholecalciferol (VITAMIN D-3 PO) Take 50 mcg by mouth daily   Yes Reported, Patient   Cyanocobalamin (B-12) 1000 MCG CAPS Take 1,000 mcg by mouth daily 4/17/18  Yes Don Geiger MD   doxazosin (CARDURA) 8 MG tablet Take 1 tablet (8 mg) by mouth daily 9/11/17  Yes JABIER Kent MD   folic acid (FOLVITE) 1 MG tablet Take 1 tablet (1 mg) by mouth daily 4/17/18  Yes Don Geiger MD   furosemide (LASIX) 20 MG tablet Take 1 tablet (20 mg) by mouth daily 10/9/18  Yes JABIER Kent MD   lisinopril (PRINIVIL/ZESTRIL) 10 MG tablet Take 1 tablet (10 mg) by mouth daily 10/9/18  Yes JABIER Kent MD   Pyridoxine HCl (VITAMIN B6 PO) Take 100 mg by mouth daily   Yes Reported, Patient   warfarin (COUMADIN) 2.5 MG tablet TAKE 1/2 TABLET (1.25MG) ON WEDNESDAYS AND 1 TABLET (2.5MG) ON ALL OTHER DAYS OR AS DIRECTED BY THE [facility]  Patient taking differently: TAKE 1 TABLET (2.5MG) DAILY OR AS DIRECTED BY THE [facility] 4/10/18   JABIER Kent MD          ROS:   CONSTITUTIONAL: No weight loss, fever, chills, but admits to weakness and fatigue.   HEENT: Eyes: No visual changes. Ears, Nose, Throat: No hearing loss, congestion or difficulty swallowing.   CARDIOVASCULAR: No chest pain, chest pressure or chest discomfort. No palpitations but was significant lower  extremity edema.   RESPIRATORY: (+) shortness of breath with dyspnea upon exertion but no cough or sputum production.   GASTROINTESTINAL: No abdominal pain. No anorexia, nausea, vomiting or diarrhea.   NEUROLOGICAL: No headache, lightheadedness, dizziness, syncope, ataxia or weakness.   HEMATOLOGIC: No anemia, bleeding or bruising.   PSYCHIATRIC: No history of depression or anxiety.   ENDOCRINOLOGIC: No reports of sweating, cold or heat intolerance. No polyuria or polydipsia.   SKIN: No abnormal rashes or itching.       PHYSICAL EXAM:   GENERAL: The patient is a well-developed, well-nourished, in no apparent distress. Alert and oriented x3.   HEENT: Head is normocephalic and atraumatic. Eyes are symmetrical with normal visual tracking.  HEART: Regular rate and rhythm, S1S2 present without murmur, rub or gallop.   LUNGS: Respirations regular and unlabored. Clear to auscultation.   GI: Abdomen is soft and nondistended.   EXTREMITIES: No peripheral edema present.   MUSCULOSKELETAL: No joint swelling.   NEUROLOGIC: Alert and oriented X3.    SKIN: No jaundice. No rashes or visible skin lesions present.       EKG:    His EKG today shows a new finding of atrial fibrillation.    LAB RESULTS:   Anticoagulation Therapy Visit on 10/15/2018   Component Date Value Ref Range Status     INR Protime 10/15/2018 3.9* 0.86 - 1.14 Final   Office Visit on 10/09/2018   Component Date Value Ref Range Status     N-Terminal Pro Bnp 10/09/2018 2114* 0 - 450 pg/mL Final   Orders Only on 10/08/2018   Component Date Value Ref Range Status     Homocysteine umol/L 10/08/2018 10.2  4.0 - 12.0 umol/L Final     WBC 10/08/2018 6.8  4.0 - 11.0 10e9/L Final     RBC Count 10/08/2018 4.57  4.4 - 5.9 10e12/L Final     Hemoglobin 10/08/2018 11.3* 13.3 - 17.7 g/dL Final     Hematocrit 10/08/2018 36.5* 40.0 - 53.0 % Final     MCV 10/08/2018 80  78 - 100 fl Final     MCH 10/08/2018 24.7* 26.5 - 33.0 pg Final     MCHC 10/08/2018 31.0* 31.5 - 36.5 g/dL Final      RDW 10/08/2018 15.9* 10.0 - 15.0 % Final     Platelet Count 10/08/2018 203  150 - 450 10e9/L Final     Diff Method 10/08/2018 Automated Method   Final     % Neutrophils 10/08/2018 59.4  % Final     % Lymphocytes 10/08/2018 30.4  % Final     % Monocytes 10/08/2018 8.7  % Final     % Eosinophils 10/08/2018 1.0  % Final     % Basophils 10/08/2018 0.4  % Final     % Immature Granulocytes 10/08/2018 0.1  % Final     Nucleated RBCs 10/08/2018 0  0 /100 Final     Absolute Neutrophil 10/08/2018 4.0  1.6 - 8.3 10e9/L Final     Absolute Lymphocytes 10/08/2018 2.1  0.8 - 5.3 10e9/L Final     Absolute Monocytes 10/08/2018 0.6  0.0 - 1.3 10e9/L Final     Absolute Eosinophils 10/08/2018 0.1  0.0 - 0.7 10e9/L Final     Absolute Basophils 10/08/2018 0.0  0.0 - 0.2 10e9/L Final     Abs Immature Granulocytes 10/08/2018 0.0  0 - 0.4 10e9/L Final     Absolute Nucleated RBC 10/08/2018 0.0   Final     Sodium 10/08/2018 142  133 - 144 mmol/L Final     Potassium 10/08/2018 3.9  3.4 - 5.3 mmol/L Final     Chloride 10/08/2018 109  94 - 109 mmol/L Final     Carbon Dioxide 10/08/2018 28  20 - 32 mmol/L Final     Anion Gap 10/08/2018 5  3 - 14 mmol/L Final     Glucose 10/08/2018 91  70 - 99 mg/dL Final     Urea Nitrogen 10/08/2018 21  7 - 30 mg/dL Final     Creatinine 10/08/2018 1.02  0.66 - 1.25 mg/dL Final     GFR Estimate 10/08/2018 69  >60 mL/min/1.7m2 Final     GFR Estimate If Black 10/08/2018 84  >60 mL/min/1.7m2 Final     Calcium 10/08/2018 8.4* 8.5 - 10.1 mg/dL Final     Bilirubin Total 10/08/2018 0.7  0.2 - 1.3 mg/dL Final     Albumin 10/08/2018 3.4  3.4 - 5.0 g/dL Final     Protein Total 10/08/2018 7.6  6.8 - 8.8 g/dL Final     Alkaline Phosphatase 10/08/2018 71  40 - 150 U/L Final     ALT 10/08/2018 34  0 - 70 U/L Final     AST 10/08/2018 19  0 - 45 U/L Final     Lactate Dehydrogenase 10/08/2018 238* 85 - 227 U/L Final     D-Dimer ng/mL 10/08/2018 249  0 - 300 ng/ml D-DU Final   Anticoagulation Therapy Visit on 09/13/2018    Component Date Value Ref Range Status     INR Protime 09/13/2018 2.6* 0.86 - 1.14 Final   Anticoagulation Therapy Visit on 08/23/2018   Component Date Value Ref Range Status     INR Protime 08/23/2018 1.8* 0.86 - 1.14 Final            ASSESSMENT:       ICD-10-CM    1. Diastolic dysfunction I51.9 EKG 12-lead complete w/read - (Clinic Performed)     furosemide (LASIX) 40 MG tablet     Basic metabolic panel     BNP-N terminal pro     metoprolol succinate (TOPROL-XL) 25 MG 24 hr tablet   2. Essential hypertension, benign I10 EKG 12-lead complete w/read - (Clinic Performed)     furosemide (LASIX) 40 MG tablet     metoprolol succinate (TOPROL-XL) 25 MG 24 hr tablet   3. New onset a-fib (H) I48.91 metoprolol succinate (TOPROL-XL) 25 MG 24 hr tablet   4. Localized edema R60.0 furosemide (LASIX) 40 MG tablet   5. Sedentary lifestyle Z91.89    6. History of pulmonary embolism-bilateral on a CT scan from 1/10/18 Z86.711    7. LAE (left atrial enlargement)-mild I51.7    8. On Coumadin for atrial fibrillation Z78.9    9. Chronic kidney disease, stage 2 (mild) N18.2    10. Chronic diastolic heart failure (H)  I50.32 BNP-N terminal pro         PLAN:   1.  We had a long discussion about diastolic dysfunction.  I explained the underlying etiology and secondary causes.  His echo from 1/11/18 showed diastolic dysfunction grade 1 with a normal ejection fraction of 55-60%.  He is aware that he is to increase his activity and manage his weight.  He is currently on Lasix 20 mg daily and would recommend this be increased to 40 mg daily.  He is weigh himself on a daily basis, maintain his sodium restriction of 2500 mg daily, and fluid restriction of 2 L.  He maintains a sedentary lifestyle and plans to be more active.  2.  He was found to be in atrial fibrillation on his EKG today. This is a new finding as he is never been told this in the past. This likely explains his new onset fatigue. A cardioversion was suggested secondary to ongoing  fatigue with mild left atrial enlargement. He was not ready to commit to any procedures but wished to treat with medical management. He is currently on Coumadin with a history of bilateral PEs on a CT scan from 1/10/18.  He admits to being fatigued for the last few weeks which is new. For now, he will continue Coumadin and metoprolol and we will discuss the cardioversion in the future.  3.  He has been encouraged to increase his activity.  4.  He will have a BNP and BMP completed in approximately 1 week with increase of Lasix from 20 mg to 40 mg in light of diastolic dysfunction grade 1 from an echocardiogram on 1/11/18.  5.  He will be seen in approximately 1 month follow-up.      Thank you for allowing me to participate in the care of your patient. Please do not hesitate to contact me if you have any questions.     Preston Bryant, DO

## 2018-10-22 NOTE — NURSING NOTE
"Chief Complaint   Patient presents with     Consult     Referral Dr. Uriel Kent- Diastolic dysfunction;         Initial /68 (BP Location: Left arm, Patient Position: Chair, Cuff Size: Adult Regular)  Pulse 80  Resp 16  Ht 1.753 m (5' 9\")  Wt 76.2 kg (168 lb)  SpO2 95%  BMI 24.81 kg/m2 Estimated body mass index is 24.81 kg/(m^2) as calculated from the following:    Height as of this encounter: 1.753 m (5' 9\").    Weight as of this encounter: 76.2 kg (168 lb).  Medication Reconciliation: complete    Meredith Rodriguez LPN    "

## 2018-10-22 NOTE — PATIENT INSTRUCTIONS
You were seen by Dr. Bryant, 10/22/2018.     1.  Please limit fluid to 64 ounces per day and sodium to 2500 mg of sodium daily.  This will help to reduce the symptoms of heart failure and swelling in the extremities.      2. Please monitor you weight daily.  If you experience a 2-3 pound increase in weight in  24 hours, or 5 pounds in one week. Please call the cardiology office as you may need your medications adjusted or you may need to be seen.      3. Please increase the dose of Lasix to 40 mg daily, ,this will be called to your pharmacy.       4.  If you develop new or worsening symptoms please call the cardiology office as you may need to be seen sooner.     5.  You may consider referral to pulmonology for evaluation of sleep apnea.   You may consider a referral to neurology to evaluate your tremors.     6.  Please return for labs in 7-10  days (October 30th, 2018) to evaluate kidney and electrolytes as this may be affected by the increased dose of Lasix.     7. Increase activity as tolerated.     8.  You will begin Metoprolol 25 mg daily, this medication help to control heart rate related to atrial fibrillation.     9.  You may consider cardioversion (shocking the heart) in an attempt to convert your heart rhythm to a normal rhythm. If you would like to proceed please call the cardiology office to schedule this procedure.      You will follow up with Dr. Bryant in 1 month.       Please call the cardiology office with problems, questions, or concerns at [phone].    If you experience chest pain, chest pressure, chest tightness, shortness of breath, fainting, lightheadedness, nausea, vomiting, or other concerning symptoms, please report to the Emergency Department or call 911. These symptoms may be emergent, and best treated in the Emergency Department.       Anisa BHAT RN-BSN  Cardiology   [facility]  [phone]

## 2018-10-22 NOTE — MR AVS SNAPSHOT
After Visit Summary   10/22/2018    Mary Irizarry    MRN: [id number]           Patient Information     Date Of Birth          8/27/1932        Visit Information        Provider Department      10/22/2018 3:00 PM Preston Bryant, DO [facility]        Today's Diagnoses     Essential hypertension, benign    -  1    Diastolic dysfunction        Pedal edema          Care Instructions    You were seen by Dr. Bryant, 10/22/2018.     1.  Please limit fluid to 64 ounces per day and sodium to 2500 mg of sodium daily.  This will help to reduce the symptoms of heart failure and swelling in the extremities.      2. Please monitor you weight daily.  If you experience a 2-3 pound increase in weight in  24 hours, or 5 pounds in one week. Please call the cardiology office as you may need your medications adjusted or you may need to be seen.      3. Please increase the dose of Lasix to 40 mg daily, ,this will be called to your pharmacy.       4.  If you develop new or worsening symptoms please call the cardiology office as you may need to be seen sooner.     5.  You may consider referral to pulmonology for evaluation of sleep apnea.   You may consider a referral to neurology to evaluate your tremors.     6.  Please return for labs in 7-10  days (October 30th, 2018) to evaluate kidney and electrolytes as this may be affected by the increased dose of Lasix.     7. Increase activity as tolerated.     8.  You will begin Metoprolol 25 mg daily, this medication help to control heart rate related to atrial fibrillation.     9.  You may consider cardioversion (shocking the heart) in an attempt to convert your heart rhythm to a normal rhythm. If you would like to proceed please call the cardiology office to schedule this procedure.      You will follow up with Dr. Bryant in 1 month.       Please call the cardiology office with problems, questions, or concerns at [phone].    If you experience chest  "pain, chest pressure, chest tightness, shortness of breath, fainting, lightheadedness, nausea, vomiting, or other concerning symptoms, please report to the Emergency Department or call 911. These symptoms may be emergent, and best treated in the Emergency Department.       Anisa BHAT RN-BSN  Cardiology   [facility]  [phone]              Follow-ups after your visit        Your next 10 appointments already scheduled     Nov 05, 2018  1:00 PM CST   Antico[facility] with HC ANTI COAGULATION   [facility]    [address]  [place] [id number] [phone]            Nov 26, 2018  1:00 PM CST   (Arrive by 12:45 PM)   Return Visit with Preston Bryant,    [facility]    [address] [facility] [id number] [phone]              Who to contact     If you have questions or need follow up information about today's clinic visit or your schedule please contact [facility] directly at [phone].  Normal or non-critical lab and imaging results will be communicated to you by MyChart, letter or phone within 4 business days after the clinic has received the results. If you do not hear from us within 7 days, please contact the clinic through MyChart or phone. If you have a critical or abnormal lab result, we will notify you by phone as soon as possible.  Submit refill requests through [employer] or call your pharmacy and they will forward the refill request to us. Please allow 3 business days for your refill to be completed.          Additional Information About Your Visit        Care EveryWhere ID     This is your Care EveryWhere ID. This could be used by other organizations to access your [facility] medical records  [id number]        Your Vitals Were     Pulse Respirations Height Pulse Oximetry BMI (Body Mass Index)       80 16 1.753 m (5' 9\") 95% 24.81 kg/m2  "       Blood Pressure from Last 3 Encounters:   10/22/18 119/68   10/15/18 102/60   10/09/18 108/72    Weight from Last 3 Encounters:   10/22/18 76.2 kg (168 lb)   10/15/18 79.2 kg (174 lb 9.6 oz)   10/09/18 79.8 kg (176 lb)              We Performed the Following     EKG 12-lead complete w/read - (Clinic Performed)          Today's Medication Changes          These changes are accurate as of 10/22/18  4:05 PM.  If you have any questions, ask your nurse or doctor.               These medicines have changed or have updated prescriptions.        Dose/Directions    warfarin 2.5 MG tablet   Commonly known as:  COUMADIN   This may have changed:  See the new instructions.   Used for:  Long-term (current) use of anticoagulants, Pulmonary embolus (H)        TAKE 1/2 TABLET (1.25MG) ON WEDNESDAYS AND 1 TABLET (2.5MG) ON ALL OTHER DAYS OR AS DIRECTED BY THE [facility]ARFARIN CLINIC   Quantity:  110 tablet   Refills:  3                Primary Care Provider Office Phone # Fax #    R Uriel [place] [phone] [address] [place] [address]        Equal Access to Services     [facility] MORENITA AH: Hadii savannah rodríguez hadasho Somaritzaali, waaxda luqadaha, qaybta kaalmada adeegyada, delia le. So [facility] [phone].    ATENCIÓN: Si habla español, tiene a quiroz disposición servicios gratuitos de asistencia lingüística. Aydename al [phone].    We comply with applicable federal civil rights laws and [place] laws. We do not discriminate on the basis of race, color, national origin, age, disability, sex, sexual orientation, or gender identity.            Thank you!     Thank you for choosing [facility]  for your care. Our goal is always to provide you with excellent care. Hearing back from our patients is one way we can continue to improve our services. Please take a few minutes to complete the written survey that you may receive in the mail after your visit with us. Thank  you!             Your Updated Medication List - Protect others around you: Learn how to safely use, store and throw away your medicines at www.disposemymeds.org.          This list is accurate as of 10/22/18  4:05 PM.  Always use your most recent med list.                   Brand Name Dispense Instructions for use Diagnosis    B-12 1000 MCG Caps     90 capsule    Take 1,000 mcg by mouth daily    Primary hypercoagulable state (H)       doxazosin 8 MG tablet    CARDURA    90 tablet    Take 1 tablet (8 mg) by mouth daily    Essential hypertension, benign       folic acid 1 MG tablet    FOLVITE    90 tablet    Take 1 tablet (1 mg) by mouth daily    Primary hypercoagulable state (H)       furosemide 20 MG tablet    LASIX    30 tablet    Take 1 tablet (20 mg) by mouth daily    Pedal edema       lisinopril 10 MG tablet    PRINIVIL/ZESTRIL    30 tablet    Take 1 tablet (10 mg) by mouth daily    Diastolic dysfunction       VITAMIN B6 PO      Take 100 mg by mouth daily        VITAMIN D-3 PO      Take 50 mcg by mouth daily        warfarin 2.5 MG tablet    COUMADIN    110 tablet    TAKE 1/2 TABLET (1.25MG) ON WEDNESDAYS AND 1 TABLET (2.5MG) ON ALL OTHER DAYS OR AS DIRECTED BY THE [facility] CL[facility]    Long-term (current) use of anticoagulants, Pulmonary embolus (H)

## 2018-11-05 NOTE — PROGRESS NOTES
ANTICOAGULATION FOLLOW-UP CLINIC VISIT    Patient Name:  Mary Irizarry  Date:  11/5/2018  Contact Type:  Face to Face,  Call placed to patient and message left on voicemail also     SUBJECTIVE:     Patient Findings     Comments Patient seen in warfarin clinic and sent to lab for INR r/t recall of coaguchek strips. Per patient wife, he recently started lasix and metoprolol. We discussed these medications should not affect his INR and warfarin dosing. I explained why patient has to go to lab and I will call them when I get result back and let them know new warfarin dosing and INR recheck date. They verbalized understanding. Call placed to patient and message left re: INR result, warfarin dosing and INR recheck date. Also encourage patient to increase vit K intake. He is to call warfarin clinic if he has any bleeding/bruising, changes in diet/meds/activity or questions.            OBJECTIVE    INR   Date Value Ref Range Status   11/05/2018 3.57 (H) 0.80 - 1.20 Final       ASSESSMENT / PLAN  INR assessment SUPRA    Recheck INR In: 10 DAYS    INR Location Clinic      Anticoagulation Summary as of 11/5/2018     INR goal 2.0-3.0   Today's INR 3.57!   Warfarin maintenance plan 2.5 mg (2.5 mg x 1) every day   Full warfarin instructions 11/5: Hold; 11/6: Hold; Otherwise 2.5 mg every day   Weekly warfarin total 17.5 mg   Plan last modified Marylou Joshi RN (8/23/2018)   Next INR check 11/16/2018   Target end date     Indications   History of pulmonary embolism-bilateral on a CT scan from 1/10/18 [Z86.711]         Anticoagulation Episode Summary     INR check location     Preferred lab     Send INR reminders to  ANTICOAG POOL    Comments       Anticoagulation Care Providers     Provider Role Specialty Phone number    JABIER [place] MD [facility] [phone]            See the Encounter Report to view Anticoagulation Flowsheet and Dosing Calendar (Go to Encounters tab in chart review, and find the  Anticoagulation Therapy Visit)        Marylou Joshi RN

## 2018-11-05 NOTE — MR AVS SNAPSHOT
Mary Pageoneedgar   11/5/2018 1:00 PM   Anticoagulation Therapy Visit    Description:  86 year old male   Provider:   ANTI COAGULATION   Department:  Hc Anti Coagulation           INR as of 11/5/2018     Today's INR 3.57!      Anticoagulation Summary as of 11/5/2018     INR goal 2.0-3.0   Today's INR 3.57!   Full warfarin instructions 11/5: Hold; 11/6: Hold; Otherwise 2.5 mg every day   Next INR check 11/16/2018    Indications   History of pulmonary embolism bilateral on a CT scan from 1/10/18 [Z86.711]         Your next Anticoagulation Clinic appointment(s)     Nov 16, 2018  1:15 PM CST   Anticoagulation Visit with  ANTI COAGULATION   [facility] [place] ([facility] )    [address]  [place] [address]   [phone]              November 2018 Details    Sun Mon Tue Wed Thu Fri Sat         1               2               3                 4               5      Hold   See details      6      Hold         7      2.5 mg         8      2.5 mg         9      2.5 mg         10      2.5 mg           11      2.5 mg         12      2.5 mg         13      2.5 mg         14      2.5 mg         15      2.5 mg         16            17                 18               19               20               21               22               23               24                 25               26               27               28               29               30                 Date Details   11/05 This INR check       Date of next INR:  11/16/2018         How to take your warfarin dose     To take:  2.5 mg Take 1 of the 2.5 mg tablets.    Hold Do not take your warfarin dose. See the Details table to the right for additional instructions.

## 2018-11-16 NOTE — MR AVS SNAPSHOT
Mary Irizarry   11/16/2018 1:15 PM   Anticoagulation Therapy Visit    Description:  86 year old male   Provider:   ANTI COAGULATION   Department:  Hc Anti Coagulation           INR as of 11/16/2018     Today's INR 2.6      Anticoagulation Summary as of 11/16/2018     INR goal 2.0-3.0   Today's INR 2.6   Full warfarin instructions 2.5 mg every day   Next INR check 12/14/2018    Indications   History of pulmonary embolism bilateral on a CT scan from 1/10/18 [Z86.711]         Your next Anticoagulation Clinic appointment(s)     Dec 14, 2018  1:15 PM CST   Anticoagulation Visit with  ANTI COAGULATION   [facility] [place] ([facility] )    [address] [place] [address]   [phone]              November 2018 Details    Sun Mon Tue Wed Thu Fri Sat         1               2               3                 4               5               6               7               8               9               10                 11               12               13               14               15               16      2.5 mg   See details      17      2.5 mg           18      2.5 mg         19      2.5 mg         20      2.5 mg         21      2.5 mg         22      2.5 mg         23      2.5 mg         24      2.5 mg           25      2.5 mg         26      2.5 mg         27      2.5 mg         28      2.5 mg         29      2.5 mg         30      2.5 mg           Date Details   11/16 This INR check               How to take your warfarin dose     To take:  2.5 mg Take 1 of the 2.5 mg tablets.           December 2018 Details    Sun Mon Tue Wed Thu Fri Sat           1      2.5 mg           2      2.5 mg         3      2.5 mg         4      2.5 mg         5      2.5 mg         6      2.5 mg         7      2.5 mg         8      2.5 mg           9      2.5 mg         10      2.5 mg         11      2.5 mg         12      2.5 mg         13      2.5 mg         14            15                  16               17               18               19               20               21               22                 23               24               25               26               27               28               29                 30               31                     Date Details   No additional details    Date of next INR:  12/14/2018         How to take your warfarin dose     To take:  2.5 mg Take 1 of the 2.5 mg tablets.

## 2018-11-16 NOTE — PROGRESS NOTES
ANTICOAGULATION FOLLOW-UP CLINIC VISIT    Patient Name:  Mary Irizarry  Date:  11/16/2018  Contact Type:  Face to Face    SUBJECTIVE:     Patient Findings     Comments We discussed vit K intake and what is higher in vit K.            OBJECTIVE    INR Protime   Date Value Ref Range Status   11/16/2018 2.6 (A) 0.86 - 1.14 Final       ASSESSMENT / PLAN  INR assessment THER    Recheck INR In: 4 WEEKS    INR Location Clinic      Anticoagulation Summary as of 11/16/2018     INR goal 2.0-3.0   Today's INR 2.6   Warfarin maintenance plan 2.5 mg (2.5 mg x 1) every day   Full warfarin instructions 2.5 mg every day   Weekly warfarin total 17.5 mg   Plan last modified Marylou Joshi RN (8/23/2018)   Next INR check 12/14/2018   Target end date     Indications   History of pulmonary embolism-bilateral on a CT scan from 1/10/18 [Z86.711]         Anticoagulation Episode Summary     INR check location     Preferred lab     Send INR reminders to [facility] POOL    Comments       Anticoagulation Care Providers     Provider Role Specialty Phone number    JABIER [place] MD [facility] [phone]            See the Encounter Report to view Anticoagulation Flowsheet and Dosing Calendar (Go to Encounters tab in chart review, and find the Anticoagulation Therapy Visit)        Marylou Joshi RN

## 2018-11-26 PROBLEM — R06.09 DYSPNEA ON EXERTION: Status: ACTIVE | Noted: 2018-01-01

## 2018-11-26 NOTE — MR AVS SNAPSHOT
After Visit Summary   11/26/2018    Mary Irizarry    MRN: [id number]           Patient Information     Date Of Birth          8/27/1932        Visit Information        Provider Department      11/26/2018 1:00 PM Preston Bryant, DO [facility]        Today's Diagnoses     Other fatigue    -  1    Essential hypertension, benign        Diastolic dysfunction        New onset a-fib diagnosed 10/22/18        Localized edema        On Coumadin for atrial fibrillation        LAE (left atrial enlargement) mild        Chronic diastolic heart failure (H)         Chronic kidney disease, stage 2 (mild)        Sedentary lifestyle        History of pulmonary embolism bilateral on a CT scan from 1/10/18          Care Instructions    You were seen by Dr. Bryant, 11/26/2018.     1. Refills for Metoprolol, Lisinopril, Lasix, and Cardura have been printed and signed, and we will [facility] the written [facility]       2. Limit your salt intake to no more than 2500 mg each day.  Salt is a magnet for water, so the more salt you have in your diet, the more your body is going to retain fluid.  Things like canned and boxed foods, fast food, deli meats, potato chips, and processed cheeses are very high in sodium.    3.  Limit the amount of fluids you take in to no more than 2 liters per day.  This is the equivalent of eight, 8 ounce glasses of fluids per day.  Fluids includes everything such as coffee, water, juice, milk, pop, and beer.  The more fluid you drink each day, the more your body is going to retain fluid.    4. Weigh yourself every morning after you have used the bathroom.  Try to wear the same type of clothing.  This will give you the most accurate weights.  If you notice a 2 pound weight gain from one morning to the next or 5 pounds in one week, this can be a good indicator you are retaining fluids.  You should notify us if you have these types of weight  gains, as you may need a medication dose adjustment.    You will follow up with Dr. Bryant in 6 months.       Please call the cardiology office with problems, questions, or concerns at [phone].    If you experience chest pain, chest pressure, chest tightness, shortness of breath, fainting, lightheadedness, nausea, vomiting, or other concerning symptoms, please report to the Emergency Department or call 911. These symptoms may be emergent, and best treated in the Emergency Department.     Aysha Hemphill RN  Cardiology   [place] [phone]              Follow-ups after your visit        Follow-up notes from your care team     Return in about 6 months (around 5/26/2019) for Routine Visit.      Your next 10 appointments already scheduled     Dec 03, 2018  2:00 PM CST   Ortho Treatment with Alejandra Tornow, PTA   [facility] ([facility] )    [address] [address]   [phone]            Dec 10, 2018  2:30 PM CST   Ortho Treatment with Alejandra Tornow, PTA   [facility] ([facility] )    [address] [address]   [phone]            Dec 14, 2018  1:15 PM CST   Anticoagulation Visit with HC ANTI COAGULATION   [facility]    [address] [facility] [address]   [phone]            Dec 17, 2018  2:30 PM CST   Ortho Treatment with Alejandra Tornow, PTA   [facility] ([facility] )    [address] [address]   [phone]            Dec 26, 2018  2:00 PM CST   Ortho Treatment with Alejandra Tornow, PTA   [facility] ([facility] )    [address] [address]   [phone]            May 29, 2019  1:00 PM CDT   (Arrive by 12:45 PM)   Return Visit with Preston Bryant,    [facility]    [address] [facility] [address]   [phone]             "  Who to contact     If you have questions or need follow up information about today's clinic visit or your schedule please contact [facility] directly at [phone].  Normal or non-critical lab and imaging results will be communicated to you by MyChart, letter or phone within 4 business days after the clinic has received the results. If you do not hear from us within 7 days, please contact the clinic through MyChart or phone. If you have a critical or abnormal lab result, we will notify you by phone as soon as possible.  Submit refill requests through [employer] or call your pharmacy and they will forward the refill request to us. Please allow 3 business days for your refill to be completed.          Additional Information About Your Visit        Care EveryWhere ID     This is your Care EveryWhere ID. This could be used by other organizations to access your [place] medical records  [id number]        Your Vitals Were     Pulse Respirations Height Pulse Oximetry BMI (Body Mass Index)       80 18 1.727 m (5' 8\") 96% 24.18 kg/m2        Blood Pressure from Last 3 Encounters:   11/26/18 118/67   10/22/18 119/68   10/15/18 102/60    Weight from Last 3 Encounters:   11/26/18 72.1 kg (159 lb)   10/22/18 76.2 kg (168 lb)   10/15/18 79.2 kg (174 lb 9.6 oz)              Today, you had the following     No orders found for display         Today's Medication Changes          These changes are accurate as of 11/26/18  1:50 PM.  If you have any questions, ask your nurse or doctor.               These medicines have changed or have updated prescriptions.        Dose/Directions    warfarin 2.5 MG tablet   Commonly known as:  COUMADIN   This may have changed:  See the new instructions.   Used for:  Long-term (current) use of anticoagulants, Pulmonary embolus (H)        TAKE 1/2 TABLET (1.25MG) ON WEDNESDAYS AND 1 TABLET (2.5MG) ON ALL OTHER DAYS OR AS DIRECTED BY THE WARFARIN CLINIC   Quantity:  110 tablet "   Refills:  3            Where to get your medicines      Some of these will need a paper prescription and others can be bought over the counter.  Ask your nurse if you have questions.     Bring a paper prescription for each of these medications     doxazosin 8 MG tablet    furosemide 40 MG tablet    lisinopril 10 MG tablet    metoprolol succinate 25 MG 24 hr tablet                Primary Care Provider Office Phone # Fax #    R Uriel [place] [phone] [address] [id number]        Equal Access to Services     [facility] MORENITA : Hadii aad ku hadasho Soomaali, waaxda luqadaha, qaybta kaalmada adeegyada, waxay idiin hayaan emeterio le. So [facility] [phone].    ATENCIÓN: Si habla español, tiene a quiroz disposición servicios gratuitos de asistencia lingüística. [facility] [phone].    We comply with applicable federal civil rights laws and [place] laws. We do not discriminate on the basis of race, color, national origin, age, disability, sex, sexual orientation, or gender identity.            Thank you!     Thank you for choosing [facility]  for your care. Our goal is always to provide you with excellent care. Hearing back from our patients is one way we can continue to improve our services. Please take a few minutes to complete the written survey that you may receive in the mail after your visit with us. Thank you!             Your Updated Medication List - Protect others around you: Learn how to safely use, store and throw away your medicines at www.disposemymeds.org.          This list is accurate as of 11/26/18  1:50 PM.  Always use your most recent med list.                   Brand Name Dispense Instructions for use Diagnosis    B-12 1000 MCG Caps     90 capsule    Take 1,000 mcg by mouth daily    Primary hypercoagulable state (H)       doxazosin 8 MG tablet    CARDURA    93 tablet    Take 1 tablet (8 mg) by mouth daily    Essential  hypertension, benign       folic acid 1 MG tablet    FOLVITE    90 tablet    Take 1 tablet (1 mg) by mouth daily    Primary hypercoagulable state (H)       furosemide 40 MG tablet    LASIX    93 tablet    Take 1 tablet (40 mg) by mouth daily    Diastolic dysfunction, Essential hypertension, benign, Localized edema       lisinopril 10 MG tablet    PRINIVIL/ZESTRIL    93 tablet    Take 1 tablet (10 mg) by mouth daily    Diastolic dysfunction       metoprolol succinate 25 MG 24 hr tablet    TOPROL-XL    93 tablet    Take 1 tablet (25 mg) by mouth daily    Diastolic dysfunction, Essential hypertension, benign, New onset a-fib (H)       VITAMIN B6 PO      Take 100 mg by mouth daily        VITAMIN D-3 PO      Take 50 mcg by mouth daily        warfarin 2.5 MG tablet    COUMADIN    110 tablet    TAKE 1/2 TABLET (1.25MG) ON WEDNESDAYS AND 1 TABLET (2.5MG) ON ALL OTHER DAYS OR AS DIRECTED BY THE WARFARIN CLINIC    Long-term (current) use of anticoagulants, Pulmonary embolus (H)

## 2018-11-26 NOTE — PROGRESS NOTES
Cardiology Progress Note     Assessment & Plan   Mary Irizarry is a 86 year old male who is being seen in follow-up to visit from 10/22/18.  He is being followed secondary to shortness of breath and fatigue. He was identified as having new onset atrial fibrillation on his previous visit on 10/22/18. His echo from 1/18/18 showed diastolic dysfunction grade 1 and has since been on Lasix 40 mg which was an increase from 20 mg daily on 10/22/18. His swelling has improved significantly and the shortness of breath essentially resolved. He remains fatigued sleeping all the time which may be secondary to persistent atrial fibrillation.  He remains on Coumadin with atrial fibrillation and concerns for a moderate clot burden to bilateral lobes of his lung on a CTA from 1/10/18. He was recently started on lisinopril 10 mg in conjunction with metoprolol 25 mg daily, Lasix 40 mg daily, and doxazosin 8 mg daily for blood pressure control. Overall, he is doing very well and is pleased with his progress. His only complaint today is ongoing fatigue. They are requesting to have 4 of his medications refilled. [facility] [place]    Impression:  1.  Shortness of breath likely secondary to diastolic dysfunction grade 1 on an echo from 1/11/18.  2.  Fatigue potentially secondary to atrial fibrillation versus DHARMESH.  3.  Persistent atrial fibrillation diagnosed on 10/20/18.  4.  Hypertension.  5.  History of bilateral PEs on a CT scan from 1/10/18 currently on Coumadin.  6.  History of tobacco abuse at age 18 for approximately 2 months.  7.  Lower extremity edema.  8.  On Coumadin for atrial fibrillation.  9.  Mild left atrial enlargement  10.  Chronic diastolic dysfunction grade 1 on 1/11/18.  11.  Chronic kidney disease stage II.  12.  Sedentary lifestyle.  13.  Dyspnea on exertion.    Plan:  1.  He will have metoprolol, lisinopril, Lasix, and Cardura refilled.  He is requesting 90 days and these prescription [facility] [place].  2.  He is to watch his salt intake limiting it to 2500 mg daily, fluid restriction of 2 liters a day, and obtain daily weights with diastolic dysfunction.  3.  He has been instructed to increase his activity in treatment of diastolic dysfunction.  4.  He should be considered for a sleep study at some time in the future with his fatigue.  5.  He will be seen in approximately 6 months follow-up.    Preston Bryant    Interval History   Oiva being seen for a one-month follow-up.  He was diagnosed with atrial fibrillar on 10/22/18.  He has been short of breath and fatigue.  His shortness of breath has improved with the addition of Lasix 40 mg daily secondary to diastolic dysfunction grade 1 as noted on an echo from 1/11/18.  He continues to be fatigued sleeping regularly throughout the day.  His fatigue is likely contributed to by atrial fibrillation and potentially obstructive sleep apnea.    Previously, prior to initiating Lasix, he had swelling from his legs into his scrotum.  He had a BNP of 2118.  His swelling is almost completely resolved on 40 mg of Lasix.    He had an echo on 1/11/18 which showed an ejection fraction of 55-60%, diastolic dysfunction grade 1, mild left atrial enlargement, trace mitral insufficiency, and trace to mild aortic insufficiency.    His CT of the chest on 1/10/18 which showed bilateral PEs with a moderate burden.  He is currently on Coumadin.    Physical Exam       BP: 118/67 Pulse: 80   Resp: 18 SpO2: 96 %      Vitals:    11/26/18 1254   Weight: 72.1 kg (159 lb)     Vital Signs with Ranges  Pulse:  [80] 80  Resp:  [18] 18  BP: (118)/(67) 118/67  SpO2:  [96 %] 96 %  ROS is negative except that which was noted in the HPI.          Constitutional: awake, alert, cooperative, no apparent distress, and appears stated age  Eyes: Lids and lashes normal, pupils equal, sclera clear, conjunctiva normal  ENT: Normocephalic, without obvious abnormality,  atraumatic.  Respiratory: No increased work of breathing, good air exchange, clear to auscultation bilaterally, no crackles or wheezing  Cardiovascular: Normal apical impulse, regular rate and rhythm, normal S1 and S2, no S3 or S4, and no murmur noted  Musculoskeletal: no lower extremity pitting edema present  Neurologic: Awake, alert, oriented to name, place and time.  .  Neuropsychiatric: General: normal, calm and normal eye contact    Medications         Data   No results found for this or any previous visit (from the past 24 hour(s)).  No results found for this or any previous visit (from the past 24 hour(s)).

## 2018-11-26 NOTE — PATIENT INSTRUCTIONS
You were seen by Dr. Bryant, 11/26/2018.     1. Refills for Metoprolol, Lisinopril, Lasix, and Cardura have been printed and signed, and we will fax the written prescriptions today [facility].       2. Limit your salt intake to no more than 2500 mg each day.  Salt is a magnet for water, so the more salt you have in your diet, the more your body is going to retain fluid.  Things like canned and boxed foods, fast food, deli meats, potato chips, and processed cheeses are very high in sodium.    3.  Limit the amount of fluids you take in to no more than 2 liters per day.  This is the equivalent of eight, 8 ounce glasses of fluids per day.  Fluids includes everything such as coffee, water, juice, milk, pop, and beer.  The more fluid you drink each day, the more your body is going to retain fluid.    4. Weigh yourself every morning after you have used the bathroom.  Try to wear the same type of clothing.  This will give you the most accurate weights.  If you notice a 2 pound weight gain from one morning to the next or 5 pounds in one week, this can be a good indicator you are retaining fluids.  You should notify us if you have these types of weight gains, as you may need a medication dose adjustment.    You will follow up with Dr. Bryant in 6 months.       Please call the cardiology office with problems, questions, or concerns at [phone].    If you experience chest pain, chest pressure, chest tightness, shortness of breath, fainting, lightheadedness, nausea, vomiting, or other concerning symptoms, please report to the Emergency Department or call 911. These symptoms may be emergent, and best treated in the Emergency Department.     Aysha Hemphill RN  Cardiology   [facility]  [phone]

## 2018-11-30 NOTE — TELEPHONE ENCOUNTER
Patient needs a short-term refill on Lisinopril to get by until medications are [facility].  Will process a 2 month supply, per patient request, to [facility].    Aysha Hemphill RN

## 2018-12-14 NOTE — PROGRESS NOTES
ANTICOAGULATION FOLLOW-UP CLINIC VISIT    Patient Name:  Mary Irizarry  Date:  2018  Contact Type:  Face to Face    SUBJECTIVE:     Patient Findings     Positives:   No Problem Findings           OBJECTIVE    INR Protime   Date Value Ref Range Status   2018 2.0 (A) 0.86 - 1.14 Final       ASSESSMENT / PLAN  INR assessment THER    Recheck INR In: 6 WEEKS    INR Location Clinic      Anticoagulation Summary  As of 2018    INR goal:   2.0-3.0   TTR:   52.3 % (10.7 mo)   INR used for dosin.0 (2018)   Warfarin maintenance plan:   2.5 mg (2.5 mg x 1) every day   Full warfarin instructions:   2.5 mg every day   Weekly warfarin total:   17.5 mg   No change documented:   Marylou Joshi RN   Plan last modified:   Marylou Joshi RN (2018)   Next INR check:   2019   Target end date:       Indications    History of pulmonary embolism-bilateral on a CT scan from 1/10/18 [Z86.711]             Anticoagulation Episode Summary     INR check location:       Preferred lab:       Send INR reminders to:   [facility] ANTICOAG POOL    Comments:         Anticoagulation Care Providers     Provider Role Specialty Phone number    JABIER Kent MD [facility] [phone]            See the Encounter Report to view Anticoagulation Flowsheet and Dosing Calendar (Go to Encounters tab in chart review, and find the Anticoagulation Therapy Visit)        Marylou Joshi RN

## 2018-12-22 NOTE — PROGRESS NOTES
12/20/18 1500   General Information   Type of Visit Initial OP Ortho PT Evaluation   Start of Care Date 12/20/18   Referring Physician Dr LEÓN Kent    Patient/Family Goals Statement less pain, better motion   Orders Evaluate and Treat   Insurance Type Medicare   Medical Diagnosis L Shld OA, pain   Surgical/Medical history reviewed Yes   Precautions/Limitations no known precautions/limitations   Body Part(s)   Body Part(s) Shoulder   Presentation and Etiology   Pertinent history of current problem (include personal factors and/or comorbidities that impact the POC) This 85 y/o male returning to PT d/t ongoing/ recurring L shld pain and decrd motion. Pt was doing well after last PT bout in september with good HEP compliance. Pt quit doing exs as they made him hurt worse and now he is hurting all of the time.    Impairments A. Pain;D. Decreased ROM;E. Decreased flexibility;F. Decreased strength and endurance   Functional Limitations perform activities of daily living   Symptom Location L shld   How/Where did it occur From Degenerative Joint Disease   Chronicity Recurrent   Pain rating (0-10 point scale) Best (/10);Worst (/10)   Best (/10) 3   Worst (/10) 9   Pain quality A. Sharp;C. Aching   Frequency of pain/symptoms C. With activity   Pain/symptoms exacerbated by C. Lifting;D. Carrying;G. Certain positions   Pain/symptoms eased by C. Rest;G. Heat   Progression of symptoms since onset: Worsened   Prior Level of Function   Prior Level of Function-Mobility same   Prior Level of Function-ADLs indep in adls, did not need wife to assist with dressing, reaching up overhead   Current Level of Function   Current Community Support Family/friend caregiver   Patient role/employment history F. Retired   Living environment [place]   Current equipment-Gait/Locomotion None   Fall Risk Screen   Have you fallen 2 or more times in the past year? No   Have you fallen and had an injury in the past year? No   Is patient a fall  risk? No   Abuse Screen (yes response referral indicated)   Feels Unsafe at Home or Work/School no   Feels Threatened by Someone no   Does Anyone Try to Keep You From Having Contact with Others or Doing Things Outside Your Home? no   Physical Signs of Abuse Present no   Shoulder Objective Findings   Side (if bilateral, select both right and left) Left   Cervical Screen (ROM, quadrant) decrd ext and rotation bilat    Left Shoulder Flexion AROM 70   Left Shoulder Abduction AROM 50   Left Shoulder IR AROM 35   Shoulder ROM Comment all producing pain. SIgnif DJD present   Scapulothoracic Rhythm hypo   Left Shoulder Flexion Strength diffic gia MMT t/o all   Shoulder Special Tests Comments Unable to complete special tests due to lack of mobility and pain    Palpation signif mm guarding t/o trap, deltoid, levator, SCM   Observation no acute distress   Integumentary  WNL   Posture rounded shoulders, forward head   Left Shoulder ER AROM 35   Planned Therapy Interventions   Planned Therapy Interventions joint mobilization;manual therapy;neuromuscular re-education;ROM;strengthening;stretching   Planned Therapy Interventions Comment HEP, educ   Planned Modality Interventions   Planned Modality Interventions Comments prn for pain relief   Clinical Impression   Criteria for Skilled Therapeutic Interventions Met yes, treatment indicated   PT Diagnosis L shoulder pain, degenerative arthritis and 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   Influenced by the following impairments pain and weakness   Functional limitations due to impairments difficulty performing home tasks   Clinical Presentation Stable/Uncomplicated   Clinical Decision Making (Complexity) Low complexity   Therapy Frequency 2 times/Week   Predicted Duration of Therapy Intervention (days/wks) up to 6 weeks   Risk & Benefits of therapy have been explained Yes   Patient, Family & other staff in agreement with plan of care Yes   Clinical Impression Comments Pt will benefit from guided PT/HEP  instruction. Pt has signif OA and pain/guarding with probable rotator cuff involvement in addition.    Education Assessment   Preferred Learning Style Demonstration   Barriers to Learning (poor memory)   ORTHO GOALS   PT Ortho Eval Goals 1;2;3   Ortho Goal 1   Goal Identifier STG 1   Goal Description Pt will demonstrate indep HEP compliance with wife assistance   Target Date 01/05/19   Ortho Goal 2   Goal Identifier STG 2   Goal Description Patient will report 50% or more improvement in his L shoulder pain in order to perform ADLS with less dependence on wife   Target Date 01/12/19   Ortho Goal 3   Goal Identifier LTG 1   Goal Description Patient will improve his SPADI score by 8 points in order to demonstrate functional gains in function to perform ADLS with decrd difficulty and pain   Target Date 02/16/19   Therapy Certification   Certification date from 12/20/18   Certification date to 03/16/19   Medical Diagnosis L shld pain, significant OA

## 2019-01-01 ENCOUNTER — HOSPITAL ENCOUNTER (OUTPATIENT)
Dept: PHYSICAL THERAPY | Facility: HOSPITAL | Age: 84
Setting detail: THERAPIES SERIES
End: 2019-01-17
Attending: FAMILY MEDICINE
Payer: MEDICARE

## 2019-01-01 ENCOUNTER — TELEPHONE (OUTPATIENT)
Dept: FAMILY MEDICINE | Facility: OTHER | Age: 84
End: 2019-01-01

## 2019-01-01 ENCOUNTER — OFFICE VISIT (OUTPATIENT)
Dept: CARDIOLOGY | Facility: OTHER | Age: 84
End: 2019-01-01
Attending: INTERNAL MEDICINE
Payer: COMMERCIAL

## 2019-01-01 ENCOUNTER — ANTICOAGULATION THERAPY VISIT (OUTPATIENT)
Dept: ANTICOAGULATION | Facility: OTHER | Age: 84
End: 2019-01-01
Attending: FAMILY MEDICINE
Payer: MEDICARE

## 2019-01-01 ENCOUNTER — DOCUMENTATION ONLY (OUTPATIENT)
Dept: FAMILY MEDICINE | Facility: OTHER | Age: 84
End: 2019-01-01

## 2019-01-01 ENCOUNTER — HOSPITAL ENCOUNTER (OUTPATIENT)
Dept: PHYSICAL THERAPY | Facility: HOSPITAL | Age: 84
Setting detail: THERAPIES SERIES
End: 2019-06-03
Attending: INTERNAL MEDICINE
Payer: MEDICARE

## 2019-01-01 ENCOUNTER — ANTICOAGULATION THERAPY VISIT (OUTPATIENT)
Dept: ANTICOAGULATION | Facility: OTHER | Age: 84
End: 2019-01-01

## 2019-01-01 ENCOUNTER — APPOINTMENT (OUTPATIENT)
Dept: PHYSICAL THERAPY | Facility: HOSPITAL | Age: 84
End: 2019-01-01
Payer: MEDICARE

## 2019-01-01 ENCOUNTER — HOSPITAL ENCOUNTER (OUTPATIENT)
Dept: PHYSICAL THERAPY | Facility: HOSPITAL | Age: 84
Setting detail: THERAPIES SERIES
End: 2019-02-28
Attending: FAMILY MEDICINE
Payer: MEDICARE

## 2019-01-01 ENCOUNTER — HOSPITAL ENCOUNTER (OUTPATIENT)
Dept: MRI IMAGING | Facility: HOSPITAL | Age: 84
Discharge: HOME | End: 2019-07-29
Attending: FAMILY MEDICINE | Admitting: FAMILY MEDICINE
Payer: MEDICARE

## 2019-01-01 ENCOUNTER — OFFICE VISIT (OUTPATIENT)
Dept: FAMILY MEDICINE | Facility: OTHER | Age: 84
End: 2019-01-01
Attending: FAMILY MEDICINE
Payer: COMMERCIAL

## 2019-01-01 ENCOUNTER — OFFICE VISIT (OUTPATIENT)
Dept: CARDIOLOGY | Facility: OTHER | Age: 84
End: 2019-01-01
Attending: INTERNAL MEDICINE
Payer: MEDICARE

## 2019-01-01 ENCOUNTER — HOSPITAL ENCOUNTER (OUTPATIENT)
Dept: PHYSICAL THERAPY | Facility: HOSPITAL | Age: 84
Setting detail: THERAPIES SERIES
End: 2019-06-10
Attending: INTERNAL MEDICINE
Payer: MEDICARE

## 2019-01-01 ENCOUNTER — HOSPITAL ENCOUNTER (OUTPATIENT)
Facility: HOSPITAL | Age: 84
Setting detail: OBSERVATION
Discharge: HOME | End: 2019-08-14
Attending: PHYSICIAN ASSISTANT | Admitting: INTERNAL MEDICINE
Payer: MEDICARE

## 2019-01-01 ENCOUNTER — HOSPITAL ENCOUNTER (OUTPATIENT)
Dept: PHYSICAL THERAPY | Facility: HOSPITAL | Age: 84
Setting detail: THERAPIES SERIES
End: 2019-06-18
Attending: INTERNAL MEDICINE
Payer: MEDICARE

## 2019-01-01 ENCOUNTER — TELEPHONE (OUTPATIENT)
Dept: CARDIOLOGY | Facility: OTHER | Age: 84
End: 2019-01-01

## 2019-01-01 ENCOUNTER — HOSPITAL ENCOUNTER (EMERGENCY)
Facility: HOSPITAL | Age: 84
Discharge: HOME | End: 2019-01-14
Attending: FAMILY MEDICINE | Admitting: FAMILY MEDICINE
Payer: MEDICARE

## 2019-01-01 ENCOUNTER — HOSPITAL ENCOUNTER (OUTPATIENT)
Dept: CARDIOLOGY | Facility: HOSPITAL | Age: 84
Setting detail: OBSERVATION
End: 2019-08-14
Attending: INTERNAL MEDICINE
Payer: MEDICARE

## 2019-01-01 ENCOUNTER — HOSPITAL ENCOUNTER (OUTPATIENT)
Dept: PHYSICAL THERAPY | Facility: HOSPITAL | Age: 84
Setting detail: THERAPIES SERIES
End: 2019-01-10
Attending: FAMILY MEDICINE
Payer: MEDICARE

## 2019-01-01 ENCOUNTER — HOSPITAL ENCOUNTER (OUTPATIENT)
Dept: PHYSICAL THERAPY | Facility: HOSPITAL | Age: 84
Setting detail: THERAPIES SERIES
End: 2019-02-21
Attending: FAMILY MEDICINE
Payer: MEDICARE

## 2019-01-01 ENCOUNTER — RESULTS ONLY (OUTPATIENT)
Dept: LAB | Age: 84
End: 2019-01-01

## 2019-01-01 ENCOUNTER — MEDICAL CORRESPONDENCE (OUTPATIENT)
Dept: HEALTH INFORMATION MANAGEMENT | Facility: CLINIC | Age: 84
End: 2019-01-01

## 2019-01-01 ENCOUNTER — HOSPITAL ENCOUNTER (OUTPATIENT)
Dept: PHYSICAL THERAPY | Facility: HOSPITAL | Age: 84
Setting detail: THERAPIES SERIES
End: 2019-01-07
Attending: FAMILY MEDICINE
Payer: MEDICARE

## 2019-01-01 ENCOUNTER — HOSPITAL ENCOUNTER (OUTPATIENT)
Dept: PHYSICAL THERAPY | Facility: HOSPITAL | Age: 84
Setting detail: THERAPIES SERIES
End: 2019-07-09
Attending: INTERNAL MEDICINE
Payer: MEDICARE

## 2019-01-01 ENCOUNTER — HOSPITAL ENCOUNTER (OUTPATIENT)
Dept: PHYSICAL THERAPY | Facility: HOSPITAL | Age: 84
Setting detail: THERAPIES SERIES
End: 2019-07-17
Attending: INTERNAL MEDICINE
Payer: MEDICARE

## 2019-01-01 ENCOUNTER — ANCILLARY PROCEDURE (OUTPATIENT)
Dept: GENERAL RADIOLOGY | Facility: OTHER | Age: 84
End: 2019-01-01
Attending: FAMILY MEDICINE
Payer: MEDICARE

## 2019-01-01 ENCOUNTER — TRANSFERRED RECORDS (OUTPATIENT)
Dept: HEALTH INFORMATION MANAGEMENT | Facility: CLINIC | Age: 84
End: 2019-01-01

## 2019-01-01 ENCOUNTER — APPOINTMENT (OUTPATIENT)
Dept: GENERAL RADIOLOGY | Facility: HOSPITAL | Age: 84
End: 2019-01-01
Attending: PHYSICIAN ASSISTANT
Payer: MEDICARE

## 2019-01-01 ENCOUNTER — HOSPITAL ENCOUNTER (OUTPATIENT)
Dept: PHYSICAL THERAPY | Facility: HOSPITAL | Age: 84
Setting detail: THERAPIES SERIES
End: 2019-06-24
Attending: INTERNAL MEDICINE
Payer: MEDICARE

## 2019-01-01 ENCOUNTER — HOSPITAL ENCOUNTER (OUTPATIENT)
Dept: PHYSICAL THERAPY | Facility: HOSPITAL | Age: 84
Setting detail: THERAPIES SERIES
End: 2019-06-27
Attending: INTERNAL MEDICINE
Payer: MEDICARE

## 2019-01-01 ENCOUNTER — PATIENT OUTREACH (OUTPATIENT)
Dept: CARE COORDINATION | Facility: OTHER | Age: 84
End: 2019-01-01

## 2019-01-01 ENCOUNTER — HOSPITAL ENCOUNTER (OUTPATIENT)
Dept: PHYSICAL THERAPY | Facility: HOSPITAL | Age: 84
Setting detail: THERAPIES SERIES
End: 2019-01-29
Attending: FAMILY MEDICINE
Payer: MEDICARE

## 2019-01-01 ENCOUNTER — DOCUMENTATION ONLY (OUTPATIENT)
Dept: OTHER | Facility: CLINIC | Age: 84
End: 2019-01-01

## 2019-01-01 ENCOUNTER — HOSPITAL ENCOUNTER (OUTPATIENT)
Dept: PHYSICAL THERAPY | Facility: HOSPITAL | Age: 84
Setting detail: THERAPIES SERIES
End: 2019-07-01
Attending: INTERNAL MEDICINE
Payer: MEDICARE

## 2019-01-01 ENCOUNTER — APPOINTMENT (OUTPATIENT)
Dept: OCCUPATIONAL THERAPY | Facility: HOSPITAL | Age: 84
End: 2019-01-01
Attending: INTERNAL MEDICINE
Payer: MEDICARE

## 2019-01-01 ENCOUNTER — TELEPHONE (OUTPATIENT)
Dept: CASE MANAGEMENT | Facility: HOSPITAL | Age: 84
End: 2019-01-01

## 2019-01-01 ENCOUNTER — HOSPITAL ENCOUNTER (OUTPATIENT)
Dept: PHYSICAL THERAPY | Facility: HOSPITAL | Age: 84
Setting detail: THERAPIES SERIES
End: 2019-06-06
Attending: INTERNAL MEDICINE
Payer: MEDICARE

## 2019-01-01 ENCOUNTER — OFFICE VISIT (OUTPATIENT)
Dept: FAMILY MEDICINE | Facility: OTHER | Age: 84
End: 2019-01-01
Attending: FAMILY MEDICINE
Payer: MEDICARE

## 2019-01-01 ENCOUNTER — HOSPITAL ENCOUNTER (OUTPATIENT)
Dept: PHYSICAL THERAPY | Facility: HOSPITAL | Age: 84
Setting detail: THERAPIES SERIES
End: 2019-03-07
Attending: FAMILY MEDICINE
Payer: MEDICARE

## 2019-01-01 ENCOUNTER — HOSPITAL ENCOUNTER (OUTPATIENT)
Dept: ULTRASOUND IMAGING | Facility: HOSPITAL | Age: 84
Discharge: HOME | End: 2019-06-05
Attending: INTERNAL MEDICINE | Admitting: INTERNAL MEDICINE
Payer: MEDICARE

## 2019-01-01 ENCOUNTER — HOSPITAL ENCOUNTER (OUTPATIENT)
Dept: PHYSICAL THERAPY | Facility: HOSPITAL | Age: 84
Setting detail: THERAPIES SERIES
End: 2019-06-13
Attending: INTERNAL MEDICINE
Payer: MEDICARE

## 2019-01-01 ENCOUNTER — HOSPITAL ENCOUNTER (OUTPATIENT)
Dept: PHYSICAL THERAPY | Facility: HOSPITAL | Age: 84
Setting detail: THERAPIES SERIES
End: 2019-01-24
Attending: FAMILY MEDICINE
Payer: MEDICARE

## 2019-01-01 ENCOUNTER — HOSPITAL ENCOUNTER (INPATIENT)
Facility: HOSPITAL | Age: 84
LOS: 1 days | Discharge: HOME HEALTH | DRG: 378 | End: 2019-08-09
Attending: INTERNAL MEDICINE | Admitting: INTERNAL MEDICINE
Payer: MEDICARE

## 2019-01-01 VITALS
SYSTOLIC BLOOD PRESSURE: 106 MMHG | OXYGEN SATURATION: 94 % | HEART RATE: 88 BPM | WEIGHT: 155.2 LBS | HEIGHT: 67 IN | TEMPERATURE: 97.9 F | RESPIRATION RATE: 16 BRPM | BODY MASS INDEX: 24.36 KG/M2 | DIASTOLIC BLOOD PRESSURE: 59 MMHG

## 2019-01-01 VITALS
RESPIRATION RATE: 16 BRPM | BODY MASS INDEX: 22.81 KG/M2 | DIASTOLIC BLOOD PRESSURE: 58 MMHG | WEIGHT: 150 LBS | SYSTOLIC BLOOD PRESSURE: 86 MMHG | OXYGEN SATURATION: 97 % | TEMPERATURE: 98.5 F

## 2019-01-01 VITALS
TEMPERATURE: 96.8 F | SYSTOLIC BLOOD PRESSURE: 109 MMHG | DIASTOLIC BLOOD PRESSURE: 65 MMHG | HEIGHT: 69 IN | RESPIRATION RATE: 16 BRPM | WEIGHT: 152 LBS | HEART RATE: 77 BPM | OXYGEN SATURATION: 96 % | BODY MASS INDEX: 22.51 KG/M2

## 2019-01-01 VITALS
BODY MASS INDEX: 24.8 KG/M2 | SYSTOLIC BLOOD PRESSURE: 100 MMHG | HEART RATE: 66 BPM | TEMPERATURE: 97.4 F | DIASTOLIC BLOOD PRESSURE: 54 MMHG | WEIGHT: 158 LBS | HEIGHT: 67 IN | OXYGEN SATURATION: 96 %

## 2019-01-01 VITALS
BODY MASS INDEX: 25.53 KG/M2 | DIASTOLIC BLOOD PRESSURE: 60 MMHG | WEIGHT: 163 LBS | SYSTOLIC BLOOD PRESSURE: 102 MMHG | OXYGEN SATURATION: 92 % | HEART RATE: 85 BPM

## 2019-01-01 VITALS
RESPIRATION RATE: 18 BRPM | SYSTOLIC BLOOD PRESSURE: 113 MMHG | BODY MASS INDEX: 24.14 KG/M2 | DIASTOLIC BLOOD PRESSURE: 66 MMHG | HEART RATE: 98 BPM | TEMPERATURE: 98 F | OXYGEN SATURATION: 94 % | WEIGHT: 154.1 LBS

## 2019-01-01 VITALS
TEMPERATURE: 97.2 F | HEART RATE: 90 BPM | WEIGHT: 162 LBS | HEIGHT: 67 IN | DIASTOLIC BLOOD PRESSURE: 75 MMHG | RESPIRATION RATE: 16 BRPM | BODY MASS INDEX: 25.43 KG/M2 | SYSTOLIC BLOOD PRESSURE: 113 MMHG | OXYGEN SATURATION: 98 %

## 2019-01-01 VITALS
OXYGEN SATURATION: 98 % | WEIGHT: 158 LBS | TEMPERATURE: 96.5 F | SYSTOLIC BLOOD PRESSURE: 108 MMHG | BODY MASS INDEX: 24.8 KG/M2 | HEART RATE: 91 BPM | DIASTOLIC BLOOD PRESSURE: 56 MMHG | HEIGHT: 67 IN

## 2019-01-01 VITALS
WEIGHT: 159.8 LBS | SYSTOLIC BLOOD PRESSURE: 98 MMHG | DIASTOLIC BLOOD PRESSURE: 58 MMHG | TEMPERATURE: 96.2 F | HEART RATE: 93 BPM | BODY MASS INDEX: 25.03 KG/M2

## 2019-01-01 DIAGNOSIS — Z86.711 HISTORY OF PULMONARY EMBOLISM: ICD-10-CM

## 2019-01-01 DIAGNOSIS — R29.898 WEAKNESS OF BOTH LOWER EXTREMITIES: ICD-10-CM

## 2019-01-01 DIAGNOSIS — M17.0 PRIMARY OSTEOARTHRITIS OF BOTH KNEES: ICD-10-CM

## 2019-01-01 DIAGNOSIS — I50.32 CHRONIC DIASTOLIC HEART FAILURE (H): ICD-10-CM

## 2019-01-01 DIAGNOSIS — I48.91 NEW ONSET A-FIB (H): ICD-10-CM

## 2019-01-01 DIAGNOSIS — Z86.711 HISTORY OF PULMONARY EMBOLISM: Primary | ICD-10-CM

## 2019-01-01 DIAGNOSIS — D64.9 ANEMIA: ICD-10-CM

## 2019-01-01 DIAGNOSIS — I50.9 CHF (CONGESTIVE HEART FAILURE) (H): ICD-10-CM

## 2019-01-01 DIAGNOSIS — R60.0 LOWER EXTREMITY EDEMA: ICD-10-CM

## 2019-01-01 DIAGNOSIS — I51.89 DIASTOLIC DYSFUNCTION: ICD-10-CM

## 2019-01-01 DIAGNOSIS — R73.9 HYPERGLYCEMIA: ICD-10-CM

## 2019-01-01 DIAGNOSIS — Z86.711 HX OF PULMONARY EMBOLUS: ICD-10-CM

## 2019-01-01 DIAGNOSIS — I48.91 ON COUMADIN FOR ATRIAL FIBRILLATION (H): ICD-10-CM

## 2019-01-01 DIAGNOSIS — I10 ESSENTIAL HYPERTENSION, BENIGN: ICD-10-CM

## 2019-01-01 DIAGNOSIS — Z79.01 ON COUMADIN FOR ATRIAL FIBRILLATION (H): ICD-10-CM

## 2019-01-01 DIAGNOSIS — M62.81 GENERALIZED MUSCLE WEAKNESS: ICD-10-CM

## 2019-01-01 DIAGNOSIS — I48.0 PAROXYSMAL ATRIAL FIBRILLATION (H): ICD-10-CM

## 2019-01-01 DIAGNOSIS — I51.7 LAE (LEFT ATRIAL ENLARGEMENT): ICD-10-CM

## 2019-01-01 DIAGNOSIS — M25.561 CHRONIC PAIN OF RIGHT KNEE: Primary | ICD-10-CM

## 2019-01-01 DIAGNOSIS — R06.09 DYSPNEA ON EXERTION: Primary | ICD-10-CM

## 2019-01-01 DIAGNOSIS — N18.30 CKD (CHRONIC KIDNEY DISEASE) STAGE 3, GFR 30-59 ML/MIN (H): ICD-10-CM

## 2019-01-01 DIAGNOSIS — Z91.89 SEDENTARY LIFESTYLE: ICD-10-CM

## 2019-01-01 DIAGNOSIS — R60.0 LOCALIZED EDEMA: ICD-10-CM

## 2019-01-01 DIAGNOSIS — I51.89 DIASTOLIC DYSFUNCTION: Primary | ICD-10-CM

## 2019-01-01 DIAGNOSIS — I50.32 CHRONIC DIASTOLIC HEART FAILURE (H): Primary | ICD-10-CM

## 2019-01-01 DIAGNOSIS — I95.89 OTHER SPECIFIED HYPOTENSION: ICD-10-CM

## 2019-01-01 DIAGNOSIS — I26.99 PULMONARY EMBOLUS (H): ICD-10-CM

## 2019-01-01 DIAGNOSIS — R06.09 DYSPNEA ON EXERTION: ICD-10-CM

## 2019-01-01 DIAGNOSIS — R53.81 PHYSICAL DECONDITIONING: ICD-10-CM

## 2019-01-01 DIAGNOSIS — R90.89 ABNORMAL BRAIN MRI: Primary | ICD-10-CM

## 2019-01-01 DIAGNOSIS — I95.2 HYPOTENSION DUE TO DRUGS: Primary | ICD-10-CM

## 2019-01-01 DIAGNOSIS — G89.29 CHRONIC PAIN OF RIGHT KNEE: Primary | ICD-10-CM

## 2019-01-01 DIAGNOSIS — I73.9 CLAUDICATION (H): ICD-10-CM

## 2019-01-01 DIAGNOSIS — K92.2 GASTROINTESTINAL HEMORRHAGE, UNSPECIFIED GASTROINTESTINAL HEMORRHAGE TYPE: ICD-10-CM

## 2019-01-01 DIAGNOSIS — Z87.19 HISTORY OF GI BLEED: ICD-10-CM

## 2019-01-01 DIAGNOSIS — D62 ANEMIA DUE TO BLOOD LOSS, ACUTE: ICD-10-CM

## 2019-01-01 DIAGNOSIS — I50.32 CHRONIC DIASTOLIC CONGESTIVE HEART FAILURE (H): Primary | ICD-10-CM

## 2019-01-01 DIAGNOSIS — M17.0 PRIMARY OSTEOARTHRITIS OF BOTH KNEES: Primary | ICD-10-CM

## 2019-01-01 DIAGNOSIS — R41.3 MEMORY LOSS: ICD-10-CM

## 2019-01-01 DIAGNOSIS — R30.0 DYSURIA: Primary | ICD-10-CM

## 2019-01-01 DIAGNOSIS — R25.1 TREMOR: ICD-10-CM

## 2019-01-01 DIAGNOSIS — M62.81 GENERALIZED MUSCLE WEAKNESS: Primary | ICD-10-CM

## 2019-01-01 DIAGNOSIS — R30.0 DYSURIA: ICD-10-CM

## 2019-01-01 DIAGNOSIS — I95.9 HYPOTENSION, UNSPECIFIED HYPOTENSION TYPE: ICD-10-CM

## 2019-01-01 DIAGNOSIS — R29.898 WEAKNESS OF BOTH LOWER EXTREMITIES: Primary | ICD-10-CM

## 2019-01-01 DIAGNOSIS — E86.0 DEHYDRATION: ICD-10-CM

## 2019-01-01 DIAGNOSIS — N18.2 CHRONIC KIDNEY DISEASE, STAGE 2 (MILD): ICD-10-CM

## 2019-01-01 DIAGNOSIS — Z79.01 LONG TERM CURRENT USE OF ANTICOAGULANT THERAPY: ICD-10-CM

## 2019-01-01 DIAGNOSIS — Z53.9 PERSONS ENCOUNTERING HEALTH SERVICES FOR SPECIFIC PROCEDURES, NOT CARRIED OUT: Primary | ICD-10-CM

## 2019-01-01 LAB
ALBUMIN SERPL-MCNC: 2.3 G/DL (ref 3.4–5)
ALBUMIN SERPL-MCNC: 2.6 G/DL (ref 3.4–5)
ALBUMIN SERPL-MCNC: 2.8 G/DL (ref 3.4–5)
ALBUMIN SERPL-MCNC: 2.9 G/DL (ref 3.4–5)
ALBUMIN UR-MCNC: NEGATIVE MG/DL
ALP SERPL-CCNC: 60 U/L (ref 40–150)
ALP SERPL-CCNC: 62 U/L (ref 40–150)
ALP SERPL-CCNC: 63 U/L (ref 40–150)
ALP SERPL-CCNC: 74 U/L (ref 40–150)
ALT SERPL W P-5'-P-CCNC: 28 U/L (ref 0–70)
ALT SERPL W P-5'-P-CCNC: 34 U/L (ref 0–70)
ALT SERPL W P-5'-P-CCNC: 36 U/L (ref 0–70)
ALT SERPL W P-5'-P-CCNC: 37 U/L (ref 0–70)
ANION GAP SERPL CALCULATED.3IONS-SCNC: 2 MMOL/L (ref 3–14)
ANION GAP SERPL CALCULATED.3IONS-SCNC: 3 MMOL/L (ref 3–14)
ANION GAP SERPL CALCULATED.3IONS-SCNC: 4 MMOL/L (ref 3–14)
ANION GAP SERPL CALCULATED.3IONS-SCNC: 5 MMOL/L (ref 3–14)
ANION GAP SERPL CALCULATED.3IONS-SCNC: 5 MMOL/L (ref 3–14)
ANION GAP SERPL CALCULATED.3IONS-SCNC: 6 MMOL/L (ref 3–14)
ANION GAP SERPL CALCULATED.3IONS-SCNC: 6 MMOL/L (ref 3–14)
ANION GAP SERPL CALCULATED.3IONS-SCNC: 7 MMOL/L (ref 3–14)
ANION GAP SERPL CALCULATED.3IONS-SCNC: 7 MMOL/L (ref 3–14)
ANION GAP SERPL CALCULATED.3IONS-SCNC: 8 MMOL/L (ref 3–14)
ANION GAP SERPL CALCULATED.3IONS-SCNC: 8 MMOL/L (ref 3–14)
ANION GAP SERPL CALCULATED.3IONS-SCNC: 9 MMOL/L (ref 3–14)
ANION GAP SERPL CALCULATED.3IONS-SCNC: 9 MMOL/L (ref 3–14)
APPEARANCE UR: CLEAR
AST SERPL W P-5'-P-CCNC: 21 U/L (ref 0–45)
AST SERPL W P-5'-P-CCNC: 22 U/L (ref 0–45)
AST SERPL W P-5'-P-CCNC: 22 U/L (ref 0–45)
AST SERPL W P-5'-P-CCNC: 28 U/L (ref 0–45)
BACTERIA SPEC CULT: NORMAL
BASOPHILS # BLD AUTO: 0 10E9/L (ref 0–0.2)
BASOPHILS NFR BLD AUTO: 0.1 %
BASOPHILS NFR BLD AUTO: 0.2 %
BASOPHILS NFR BLD AUTO: 0.3 %
BASOPHILS NFR BLD AUTO: 0.3 %
BILIRUB SERPL-MCNC: 0.4 MG/DL (ref 0.2–1.3)
BILIRUB SERPL-MCNC: 0.4 MG/DL (ref 0.2–1.3)
BILIRUB SERPL-MCNC: 0.5 MG/DL (ref 0.2–1.3)
BILIRUB SERPL-MCNC: 0.6 MG/DL (ref 0.2–1.3)
BILIRUB UR QL STRIP: NEGATIVE
BUN SERPL-MCNC: 26 MG/DL (ref 7–30)
BUN SERPL-MCNC: 26 MG/DL (ref 7–30)
BUN SERPL-MCNC: 29 MG/DL (ref 7–30)
BUN SERPL-MCNC: 29 MG/DL (ref 7–30)
BUN SERPL-MCNC: 39 MG/DL (ref 7–30)
BUN SERPL-MCNC: 40 MG/DL (ref 7–30)
BUN SERPL-MCNC: 42 MG/DL (ref 7–30)
BUN SERPL-MCNC: 48 MG/DL (ref 7–30)
BUN SERPL-MCNC: 54 MG/DL (ref 7–30)
BUN SERPL-MCNC: 63 MG/DL (ref 7–30)
BUN SERPL-MCNC: 77 MG/DL (ref 7–30)
CALCIUM SERPL-MCNC: 7.8 MG/DL (ref 8.5–10.1)
CALCIUM SERPL-MCNC: 7.8 MG/DL (ref 8.5–10.1)
CALCIUM SERPL-MCNC: 7.9 MG/DL (ref 8.5–10.1)
CALCIUM SERPL-MCNC: 8.1 MG/DL (ref 8.5–10.1)
CALCIUM SERPL-MCNC: 8.2 MG/DL (ref 8.5–10.1)
CALCIUM SERPL-MCNC: 8.3 MG/DL (ref 8.5–10.1)
CALCIUM SERPL-MCNC: 8.4 MG/DL (ref 8.5–10.1)
CALCIUM SERPL-MCNC: 8.5 MG/DL (ref 8.5–10.1)
CALCIUM SERPL-MCNC: 8.5 MG/DL (ref 8.5–10.1)
CALCIUM SERPL-MCNC: 8.8 MG/DL (ref 8.5–10.1)
CHLORIDE SERPL-SCNC: 102 MMOL/L (ref 94–109)
CHLORIDE SERPL-SCNC: 104 MMOL/L (ref 94–109)
CHLORIDE SERPL-SCNC: 105 MMOL/L (ref 94–109)
CHLORIDE SERPL-SCNC: 106 MMOL/L (ref 94–109)
CHLORIDE SERPL-SCNC: 107 MMOL/L (ref 94–109)
CHLORIDE SERPL-SCNC: 108 MMOL/L (ref 94–109)
CHLORIDE SERPL-SCNC: 109 MMOL/L (ref 94–109)
CHLORIDE SERPL-SCNC: 110 MMOL/L (ref 94–109)
CHLORIDE SERPL-SCNC: 113 MMOL/L (ref 94–109)
CHLORIDE SERPL-SCNC: 98 MMOL/L (ref 94–109)
CO2 SERPL-SCNC: 24 MMOL/L (ref 20–32)
CO2 SERPL-SCNC: 25 MMOL/L (ref 20–32)
CO2 SERPL-SCNC: 26 MMOL/L (ref 20–32)
CO2 SERPL-SCNC: 27 MMOL/L (ref 20–32)
CO2 SERPL-SCNC: 29 MMOL/L (ref 20–32)
CO2 SERPL-SCNC: 30 MMOL/L (ref 20–32)
CO2 SERPL-SCNC: 30 MMOL/L (ref 20–32)
CO2 SERPL-SCNC: 31 MMOL/L (ref 20–32)
COLOR UR AUTO: NORMAL
CREAT SERPL-MCNC: 1.02 MG/DL (ref 0.66–1.25)
CREAT SERPL-MCNC: 1.12 MG/DL (ref 0.66–1.25)
CREAT SERPL-MCNC: 1.21 MG/DL (ref 0.66–1.25)
CREAT SERPL-MCNC: 1.27 MG/DL (ref 0.66–1.25)
CREAT SERPL-MCNC: 1.31 MG/DL (ref 0.66–1.25)
CREAT SERPL-MCNC: 1.32 MG/DL (ref 0.66–1.25)
CREAT SERPL-MCNC: 1.41 MG/DL (ref 0.66–1.25)
CREAT SERPL-MCNC: 1.5 MG/DL (ref 0.66–1.25)
CREAT SERPL-MCNC: 1.53 MG/DL (ref 0.66–1.25)
CREAT SERPL-MCNC: 1.65 MG/DL (ref 0.66–1.25)
CREAT SERPL-MCNC: 1.71 MG/DL (ref 0.66–1.25)
CREAT SERPL-MCNC: 1.82 MG/DL (ref 0.66–1.25)
CREAT SERPL-MCNC: 2.28 MG/DL (ref 0.66–1.25)
DIFFERENTIAL METHOD BLD: ABNORMAL
EOSINOPHIL # BLD AUTO: 0 10E9/L (ref 0–0.7)
EOSINOPHIL # BLD AUTO: 0.1 10E9/L (ref 0–0.7)
EOSINOPHIL NFR BLD AUTO: 0.3 %
EOSINOPHIL NFR BLD AUTO: 0.5 %
EOSINOPHIL NFR BLD AUTO: 0.6 %
EOSINOPHIL NFR BLD AUTO: 0.6 %
EOSINOPHIL NFR BLD AUTO: 0.7 %
EOSINOPHIL NFR BLD AUTO: 1.5 %
ERYTHROCYTE [DISTWIDTH] IN BLOOD BY AUTOMATED COUNT: 17.1 % (ref 10–15)
ERYTHROCYTE [DISTWIDTH] IN BLOOD BY AUTOMATED COUNT: 17.8 % (ref 10–15)
ERYTHROCYTE [DISTWIDTH] IN BLOOD BY AUTOMATED COUNT: 18 % (ref 10–15)
ERYTHROCYTE [DISTWIDTH] IN BLOOD BY AUTOMATED COUNT: 18.5 % (ref 10–15)
ERYTHROCYTE [DISTWIDTH] IN BLOOD BY AUTOMATED COUNT: 18.7 % (ref 10–15)
ERYTHROCYTE [DISTWIDTH] IN BLOOD BY AUTOMATED COUNT: 19 % (ref 10–15)
ERYTHROCYTE [DISTWIDTH] IN BLOOD BY AUTOMATED COUNT: 19.2 % (ref 10–15)
ERYTHROCYTE [DISTWIDTH] IN BLOOD BY AUTOMATED COUNT: 19.9 % (ref 10–15)
EST. AVERAGE GLUCOSE BLD GHB EST-MCNC: 111 MG/DL
GFR SERPL CREATININE-BSD FRML MDRD: 25 ML/MIN/{1.73_M2}
GFR SERPL CREATININE-BSD FRML MDRD: 33 ML/MIN/{1.73_M2}
GFR SERPL CREATININE-BSD FRML MDRD: 35 ML/MIN/{1.73_M2}
GFR SERPL CREATININE-BSD FRML MDRD: 37 ML/MIN/{1.73_M2}
GFR SERPL CREATININE-BSD FRML MDRD: 40 ML/MIN/{1.73_M2}
GFR SERPL CREATININE-BSD FRML MDRD: 41 ML/MIN/{1.73_M2}
GFR SERPL CREATININE-BSD FRML MDRD: 44 ML/MIN/{1.73_M2}
GFR SERPL CREATININE-BSD FRML MDRD: 48 ML/MIN/{1.73_M2}
GFR SERPL CREATININE-BSD FRML MDRD: 49 ML/MIN/{1.73_M2}
GFR SERPL CREATININE-BSD FRML MDRD: 51 ML/MIN/{1.73_M2}
GFR SERPL CREATININE-BSD FRML MDRD: 54 ML/MIN/{1.73_M2}
GFR SERPL CREATININE-BSD FRML MDRD: 59 ML/MIN/{1.73_M2}
GFR SERPL CREATININE-BSD FRML MDRD: 66 ML/MIN/{1.73_M2}
GLUCOSE SERPL-MCNC: 111 MG/DL (ref 70–99)
GLUCOSE SERPL-MCNC: 123 MG/DL (ref 70–99)
GLUCOSE SERPL-MCNC: 70 MG/DL (ref 70–99)
GLUCOSE SERPL-MCNC: 72 MG/DL (ref 70–99)
GLUCOSE SERPL-MCNC: 73 MG/DL (ref 70–99)
GLUCOSE SERPL-MCNC: 75 MG/DL (ref 70–99)
GLUCOSE SERPL-MCNC: 77 MG/DL (ref 70–99)
GLUCOSE SERPL-MCNC: 82 MG/DL (ref 70–99)
GLUCOSE SERPL-MCNC: 82 MG/DL (ref 70–99)
GLUCOSE SERPL-MCNC: 87 MG/DL (ref 70–99)
GLUCOSE SERPL-MCNC: 89 MG/DL (ref 70–99)
GLUCOSE SERPL-MCNC: 91 MG/DL (ref 70–99)
GLUCOSE SERPL-MCNC: 95 MG/DL (ref 70–99)
GLUCOSE UR STRIP-MCNC: NEGATIVE MG/DL
HBA1C MFR BLD: 5.5 % (ref 0–5.6)
HCT VFR BLD AUTO: 24.9 % (ref 40–53)
HCT VFR BLD AUTO: 25.5 % (ref 40–53)
HCT VFR BLD AUTO: 26.2 % (ref 40–53)
HCT VFR BLD AUTO: 26.3 % (ref 40–53)
HCT VFR BLD AUTO: 26.3 % (ref 40–53)
HCT VFR BLD AUTO: 27.9 % (ref 40–53)
HCT VFR BLD AUTO: 28.1 % (ref 40–53)
HCT VFR BLD AUTO: 28.7 % (ref 40–53)
HCT VFR BLD AUTO: 29 % (ref 40–53)
HCT VFR BLD AUTO: 29.1 % (ref 40–53)
HCT VFR BLD AUTO: 31.3 % (ref 40–53)
HCT VFR BLD AUTO: 31.9 % (ref 40–53)
HEMOCCULT SP1 STL QL: POSITIVE
HGB BLD-MCNC: 10.2 G/DL (ref 13.3–17.7)
HGB BLD-MCNC: 7.8 G/DL (ref 13.3–17.7)
HGB BLD-MCNC: 7.9 G/DL (ref 13.3–17.7)
HGB BLD-MCNC: 8 G/DL (ref 13.3–17.7)
HGB BLD-MCNC: 8.2 G/DL (ref 13.3–17.7)
HGB BLD-MCNC: 8.3 G/DL (ref 13.3–17.7)
HGB BLD-MCNC: 8.6 G/DL (ref 13.3–17.7)
HGB BLD-MCNC: 8.7 G/DL (ref 13.3–17.7)
HGB BLD-MCNC: 8.9 G/DL (ref 13.3–17.7)
HGB BLD-MCNC: 9 G/DL (ref 13.3–17.7)
HGB BLD-MCNC: 9.1 G/DL (ref 13.3–17.7)
HGB BLD-MCNC: 9.7 G/DL (ref 13.3–17.7)
HGB UR QL STRIP: NEGATIVE
IMM GRANULOCYTES # BLD: 0 10E9/L (ref 0–0.4)
IMM GRANULOCYTES # BLD: 0.1 10E9/L (ref 0–0.4)
IMM GRANULOCYTES NFR BLD: 0.3 %
IMM GRANULOCYTES NFR BLD: 0.4 %
IMM GRANULOCYTES NFR BLD: 0.5 %
IMM GRANULOCYTES NFR BLD: 0.8 %
INR POINT OF CARE: 1.7 (ref 0.86–1.14)
INR POINT OF CARE: 2.3 (ref 0.86–1.14)
INR POINT OF CARE: 2.4 (ref 0.86–1.14)
INR POINT OF CARE: 2.5 (ref 0.86–1.14)
INR POINT OF CARE: 2.7 (ref 0.86–1.14)
INR POINT OF CARE: 3.4 (ref 0.86–1.14)
INR POINT OF CARE: 3.4 (ref 0.86–1.14)
INR POINT OF CARE: 4.5 (ref 0.86–1.14)
INR POINT OF CARE: 7.4 (ref 0.86–1.14)
INR POINT OF CARE: 7.6 (ref 0.86–1.14)
INR PPP: 1.76 (ref 0.8–1.2)
INR PPP: 4.19 (ref 0.8–1.2)
INR PPP: 6.11 (ref 0.8–1.2)
INR PPP: 6.36 (ref 0.8–1.2)
INR PPP: 6.68 (ref 0.8–1.2)
INR PPP: 6.83 (ref 0.8–1.2)
INR PPP: 7.01 (ref 0.8–1.2)
KETONES UR STRIP-MCNC: NEGATIVE MG/DL
LEUKOCYTE ESTERASE UR QL STRIP: NEGATIVE
LYMPHOCYTES # BLD AUTO: 1.1 10E9/L (ref 0.8–5.3)
LYMPHOCYTES # BLD AUTO: 1.4 10E9/L (ref 0.8–5.3)
LYMPHOCYTES # BLD AUTO: 1.5 10E9/L (ref 0.8–5.3)
LYMPHOCYTES # BLD AUTO: 1.6 10E9/L (ref 0.8–5.3)
LYMPHOCYTES # BLD AUTO: 1.6 10E9/L (ref 0.8–5.3)
LYMPHOCYTES # BLD AUTO: 1.8 10E9/L (ref 0.8–5.3)
LYMPHOCYTES NFR BLD AUTO: 18.1 %
LYMPHOCYTES NFR BLD AUTO: 20.2 %
LYMPHOCYTES NFR BLD AUTO: 22 %
LYMPHOCYTES NFR BLD AUTO: 22.2 %
LYMPHOCYTES NFR BLD AUTO: 24.8 %
LYMPHOCYTES NFR BLD AUTO: 30.7 %
MCH RBC QN AUTO: 25.7 PG (ref 26.5–33)
MCH RBC QN AUTO: 26 PG (ref 26.5–33)
MCH RBC QN AUTO: 26.1 PG (ref 26.5–33)
MCH RBC QN AUTO: 26.1 PG (ref 26.5–33)
MCH RBC QN AUTO: 26.2 PG (ref 26.5–33)
MCH RBC QN AUTO: 27.2 PG (ref 26.5–33)
MCHC RBC AUTO-ENTMCNC: 30.6 G/DL (ref 31.5–36.5)
MCHC RBC AUTO-ENTMCNC: 31 G/DL (ref 31.5–36.5)
MCHC RBC AUTO-ENTMCNC: 31.2 G/DL (ref 31.5–36.5)
MCHC RBC AUTO-ENTMCNC: 31.3 G/DL (ref 31.5–36.5)
MCHC RBC AUTO-ENTMCNC: 31.6 G/DL (ref 31.5–36.5)
MCHC RBC AUTO-ENTMCNC: 31.7 G/DL (ref 31.5–36.5)
MCHC RBC AUTO-ENTMCNC: 31.7 G/DL (ref 31.5–36.5)
MCHC RBC AUTO-ENTMCNC: 32.6 G/DL (ref 31.5–36.5)
MCV RBC AUTO: 82 FL (ref 78–100)
MCV RBC AUTO: 83 FL (ref 78–100)
MCV RBC AUTO: 84 FL (ref 78–100)
MONOCYTES # BLD AUTO: 0.4 10E9/L (ref 0–1.3)
MONOCYTES # BLD AUTO: 0.4 10E9/L (ref 0–1.3)
MONOCYTES # BLD AUTO: 0.5 10E9/L (ref 0–1.3)
MONOCYTES # BLD AUTO: 0.6 10E9/L (ref 0–1.3)
MONOCYTES # BLD AUTO: 0.6 10E9/L (ref 0–1.3)
MONOCYTES # BLD AUTO: 0.7 10E9/L (ref 0–1.3)
MONOCYTES NFR BLD AUTO: 11 %
MONOCYTES NFR BLD AUTO: 6.1 %
MONOCYTES NFR BLD AUTO: 6.4 %
MONOCYTES NFR BLD AUTO: 8.2 %
MONOCYTES NFR BLD AUTO: 8.9 %
MONOCYTES NFR BLD AUTO: 9.1 %
NEUTROPHILS # BLD AUTO: 3.3 10E9/L (ref 1.6–8.3)
NEUTROPHILS # BLD AUTO: 3.9 10E9/L (ref 1.6–8.3)
NEUTROPHILS # BLD AUTO: 4.6 10E9/L (ref 1.6–8.3)
NEUTROPHILS # BLD AUTO: 4.6 10E9/L (ref 1.6–8.3)
NEUTROPHILS # BLD AUTO: 4.9 10E9/L (ref 1.6–8.3)
NEUTROPHILS # BLD AUTO: 5.4 10E9/L (ref 1.6–8.3)
NEUTROPHILS NFR BLD AUTO: 56.1 %
NEUTROPHILS NFR BLD AUTO: 65.1 %
NEUTROPHILS NFR BLD AUTO: 68.3 %
NEUTROPHILS NFR BLD AUTO: 70 %
NEUTROPHILS NFR BLD AUTO: 70.7 %
NEUTROPHILS NFR BLD AUTO: 74 %
NITRATE UR QL: NEGATIVE
NRBC # BLD AUTO: 0 10*3/UL
NRBC BLD AUTO-RTO: 0 /100
NT-PROBNP SERPL-MCNC: 3591 PG/ML (ref 0–450)
NT-PROBNP SERPL-MCNC: 3999 PG/ML (ref 0–450)
NT-PROBNP SERPL-MCNC: 4175 PG/ML (ref 0–1800)
NT-PROBNP SERPL-MCNC: 4563 PG/ML (ref 0–450)
NT-PROBNP SERPL-MCNC: 4843 PG/ML (ref 0–450)
NT-PROBNP SERPL-MCNC: 8952 PG/ML (ref 0–1800)
PH UR STRIP: 6.5 PH (ref 4.7–8)
PLATELET # BLD AUTO: 133 10E9/L (ref 150–450)
PLATELET # BLD AUTO: 163 10E9/L (ref 150–450)
PLATELET # BLD AUTO: 174 10E9/L (ref 150–450)
PLATELET # BLD AUTO: 175 10E9/L (ref 150–450)
PLATELET # BLD AUTO: 180 10E9/L (ref 150–450)
PLATELET # BLD AUTO: 196 10E9/L (ref 150–450)
PLATELET # BLD AUTO: 196 10E9/L (ref 150–450)
PLATELET # BLD AUTO: 229 10E9/L (ref 150–450)
POTASSIUM SERPL-SCNC: 3.7 MMOL/L (ref 3.4–5.3)
POTASSIUM SERPL-SCNC: 3.8 MMOL/L (ref 3.4–5.3)
POTASSIUM SERPL-SCNC: 3.8 MMOL/L (ref 3.4–5.3)
POTASSIUM SERPL-SCNC: 4 MMOL/L (ref 3.4–5.3)
POTASSIUM SERPL-SCNC: 4.1 MMOL/L (ref 3.4–5.3)
POTASSIUM SERPL-SCNC: 4.2 MMOL/L (ref 3.4–5.3)
POTASSIUM SERPL-SCNC: 4.3 MMOL/L (ref 3.4–5.3)
POTASSIUM SERPL-SCNC: 4.6 MMOL/L (ref 3.4–5.3)
POTASSIUM SERPL-SCNC: 4.6 MMOL/L (ref 3.4–5.3)
POTASSIUM SERPL-SCNC: 4.8 MMOL/L (ref 3.4–5.3)
PROT SERPL-MCNC: 5.6 G/DL (ref 6.8–8.8)
PROT SERPL-MCNC: 5.8 G/DL (ref 6.8–8.8)
PROT SERPL-MCNC: 6.4 G/DL (ref 6.8–8.8)
PROT SERPL-MCNC: 7.2 G/DL (ref 6.8–8.8)
RBC # BLD AUTO: 3.03 10E12/L (ref 4.4–5.9)
RBC # BLD AUTO: 3.04 10E12/L (ref 4.4–5.9)
RBC # BLD AUTO: 3.15 10E12/L (ref 4.4–5.9)
RBC # BLD AUTO: 3.17 10E12/L (ref 4.4–5.9)
RBC # BLD AUTO: 3.34 10E12/L (ref 4.4–5.9)
RBC # BLD AUTO: 3.45 10E12/L (ref 4.4–5.9)
RBC # BLD AUTO: 3.48 10E12/L (ref 4.4–5.9)
RBC # BLD AUTO: 3.75 10E12/L (ref 4.4–5.9)
SODIUM SERPL-SCNC: 134 MMOL/L (ref 133–144)
SODIUM SERPL-SCNC: 137 MMOL/L (ref 133–144)
SODIUM SERPL-SCNC: 139 MMOL/L (ref 133–144)
SODIUM SERPL-SCNC: 139 MMOL/L (ref 133–144)
SODIUM SERPL-SCNC: 140 MMOL/L (ref 133–144)
SODIUM SERPL-SCNC: 141 MMOL/L (ref 133–144)
SODIUM SERPL-SCNC: 142 MMOL/L (ref 133–144)
SODIUM SERPL-SCNC: 142 MMOL/L (ref 133–144)
SOURCE: NORMAL
SP GR UR STRIP: 1.02 (ref 1–1.03)
SPECIMEN SOURCE: NORMAL
TROPONIN I SERPL-MCNC: 0.05 UG/L (ref 0–0.04)
TROPONIN I SERPL-MCNC: 0.06 UG/L (ref 0–0.04)
TROPONIN I SERPL-MCNC: <0.015 UG/L (ref 0–0.04)
TSH SERPL DL<=0.005 MIU/L-ACNC: 1.14 MU/L (ref 0.4–4)
UROBILINOGEN UR STRIP-MCNC: NORMAL MG/DL (ref 0–2)
VIT B12 SERPL-MCNC: 1253 PG/ML (ref 193–986)
WBC # BLD AUTO: 5.1 10E9/L (ref 4–11)
WBC # BLD AUTO: 5.6 10E9/L (ref 4–11)
WBC # BLD AUTO: 5.8 10E9/L (ref 4–11)
WBC # BLD AUTO: 6 10E9/L (ref 4–11)
WBC # BLD AUTO: 6.2 10E9/L (ref 4–11)
WBC # BLD AUTO: 6.6 10E9/L (ref 4–11)
WBC # BLD AUTO: 7.2 10E9/L (ref 4–11)
WBC # BLD AUTO: 7.7 10E9/L (ref 4–11)

## 2019-01-01 PROCEDURE — G0180 MD CERTIFICATION HHA PATIENT: HCPCS | Performed by: FAMILY MEDICINE

## 2019-01-01 PROCEDURE — 97110 THERAPEUTIC EXERCISES: CPT | Mod: GP

## 2019-01-01 PROCEDURE — 85014 HEMATOCRIT: CPT | Performed by: INTERNAL MEDICINE

## 2019-01-01 PROCEDURE — 25000132 ZZH RX MED GY IP 250 OP 250 PS 637: Performed by: INTERNAL MEDICINE

## 2019-01-01 PROCEDURE — G0463 HOSPITAL OUTPT CLINIC VISIT: HCPCS | Mod: 25

## 2019-01-01 PROCEDURE — 12000000 ZZH R&B MED SURG/OB

## 2019-01-01 PROCEDURE — 97166 OT EVAL MOD COMPLEX 45 MIN: CPT | Mod: GO

## 2019-01-01 PROCEDURE — 83036 HEMOGLOBIN GLYCOSYLATED A1C: CPT | Mod: ZL | Performed by: INTERNAL MEDICINE

## 2019-01-01 PROCEDURE — 93010 ELECTROCARDIOGRAM REPORT: CPT | Performed by: INTERNAL MEDICINE

## 2019-01-01 PROCEDURE — 99214 OFFICE O/P EST MOD 30 MIN: CPT | Performed by: FAMILY MEDICINE

## 2019-01-01 PROCEDURE — 85610 PROTHROMBIN TIME: CPT | Mod: ZL | Performed by: FAMILY MEDICINE

## 2019-01-01 PROCEDURE — 99217 ZZC OBSERVATION CARE DISCHARGE: CPT | Performed by: INTERNAL MEDICINE

## 2019-01-01 PROCEDURE — 85025 COMPLETE CBC W/AUTO DIFF WBC: CPT | Performed by: PHYSICIAN ASSISTANT

## 2019-01-01 PROCEDURE — G0378 HOSPITAL OBSERVATION PER HR: HCPCS

## 2019-01-01 PROCEDURE — 25800030 ZZH RX IP 258 OP 636: Performed by: INTERNAL MEDICINE

## 2019-01-01 PROCEDURE — 36415 COLL VENOUS BLD VENIPUNCTURE: CPT | Performed by: INTERNAL MEDICINE

## 2019-01-01 PROCEDURE — 80048 BASIC METABOLIC PNL TOTAL CA: CPT | Performed by: INTERNAL MEDICINE

## 2019-01-01 PROCEDURE — G0463 HOSPITAL OUTPT CLINIC VISIT: HCPCS

## 2019-01-01 PROCEDURE — 99223 1ST HOSP IP/OBS HIGH 75: CPT | Mod: AI | Performed by: INTERNAL MEDICINE

## 2019-01-01 PROCEDURE — 40000718 ZZHC STATISTIC PT DEPARTMENT ORTHO VISIT

## 2019-01-01 PROCEDURE — 99220 ZZC INITIAL OBSERVATION CARE,LEVL III: CPT | Performed by: INTERNAL MEDICINE

## 2019-01-01 PROCEDURE — 97530 THERAPEUTIC ACTIVITIES: CPT | Mod: GO

## 2019-01-01 PROCEDURE — 84443 ASSAY THYROID STIM HORMONE: CPT | Mod: ZL | Performed by: FAMILY MEDICINE

## 2019-01-01 PROCEDURE — 83880 ASSAY OF NATRIURETIC PEPTIDE: CPT | Mod: ZL | Performed by: INTERNAL MEDICINE

## 2019-01-01 PROCEDURE — 70551 MRI BRAIN STEM W/O DYE: CPT | Mod: TC

## 2019-01-01 PROCEDURE — 99232 SBSQ HOSP IP/OBS MODERATE 35: CPT | Performed by: INTERNAL MEDICINE

## 2019-01-01 PROCEDURE — 99000 SPECIMEN HANDLING OFFICE-LAB: CPT | Mod: ZL | Performed by: FAMILY MEDICINE

## 2019-01-01 PROCEDURE — 80053 COMPREHEN METABOLIC PANEL: CPT | Performed by: INTERNAL MEDICINE

## 2019-01-01 PROCEDURE — 80048 BASIC METABOLIC PNL TOTAL CA: CPT | Performed by: FAMILY MEDICINE

## 2019-01-01 PROCEDURE — 36415 COLL VENOUS BLD VENIPUNCTURE: CPT | Mod: ZL | Performed by: FAMILY MEDICINE

## 2019-01-01 PROCEDURE — 85610 PROTHROMBIN TIME: CPT | Performed by: INTERNAL MEDICINE

## 2019-01-01 PROCEDURE — 93306 TTE W/DOPPLER COMPLETE: CPT | Mod: TC

## 2019-01-01 PROCEDURE — 85027 COMPLETE CBC AUTOMATED: CPT | Performed by: INTERNAL MEDICINE

## 2019-01-01 PROCEDURE — 82607 VITAMIN B-12: CPT | Mod: ZL | Performed by: FAMILY MEDICINE

## 2019-01-01 PROCEDURE — 25000128 H RX IP 250 OP 636: Performed by: INTERNAL MEDICINE

## 2019-01-01 PROCEDURE — 85610 PROTHROMBIN TIME: CPT | Mod: QW,ZL

## 2019-01-01 PROCEDURE — 82274 ASSAY TEST FOR BLOOD FECAL: CPT | Performed by: INTERNAL MEDICINE

## 2019-01-01 PROCEDURE — 85018 HEMOGLOBIN: CPT | Performed by: INTERNAL MEDICINE

## 2019-01-01 PROCEDURE — 99284 EMERGENCY DEPT VISIT MOD MDM: CPT | Mod: Z6 | Performed by: PHYSICIAN ASSISTANT

## 2019-01-01 PROCEDURE — 20610 DRAIN/INJ JOINT/BURSA W/O US: CPT | Mod: RT | Performed by: FAMILY MEDICINE

## 2019-01-01 PROCEDURE — 99214 OFFICE O/P EST MOD 30 MIN: CPT | Performed by: INTERNAL MEDICINE

## 2019-01-01 PROCEDURE — 80053 COMPREHEN METABOLIC PANEL: CPT | Performed by: PHYSICIAN ASSISTANT

## 2019-01-01 PROCEDURE — 80048 BASIC METABOLIC PNL TOTAL CA: CPT | Mod: ZL | Performed by: FAMILY MEDICINE

## 2019-01-01 PROCEDURE — 36415 COLL VENOUS BLD VENIPUNCTURE: CPT | Performed by: PHYSICIAN ASSISTANT

## 2019-01-01 PROCEDURE — 85025 COMPLETE CBC W/AUTO DIFF WBC: CPT | Performed by: INTERNAL MEDICINE

## 2019-01-01 PROCEDURE — 25000128 H RX IP 250 OP 636: Performed by: FAMILY MEDICINE

## 2019-01-01 PROCEDURE — 93005 ELECTROCARDIOGRAM TRACING: CPT

## 2019-01-01 PROCEDURE — 73562 X-RAY EXAM OF KNEE 3: CPT | Mod: TC,50

## 2019-01-01 PROCEDURE — C9113 INJ PANTOPRAZOLE SODIUM, VIA: HCPCS | Performed by: INTERNAL MEDICINE

## 2019-01-01 PROCEDURE — 83880 ASSAY OF NATRIURETIC PEPTIDE: CPT | Performed by: FAMILY MEDICINE

## 2019-01-01 PROCEDURE — 84484 ASSAY OF TROPONIN QUANT: CPT | Performed by: PHYSICIAN ASSISTANT

## 2019-01-01 PROCEDURE — 84484 ASSAY OF TROPONIN QUANT: CPT | Mod: 91 | Performed by: FAMILY MEDICINE

## 2019-01-01 PROCEDURE — 85610 PROTHROMBIN TIME: CPT | Mod: ZL,91 | Performed by: FAMILY MEDICINE

## 2019-01-01 PROCEDURE — 96360 HYDRATION IV INFUSION INIT: CPT

## 2019-01-01 PROCEDURE — 80048 BASIC METABOLIC PNL TOTAL CA: CPT | Mod: ZL | Performed by: INTERNAL MEDICINE

## 2019-01-01 PROCEDURE — 96374 THER/PROPH/DIAG INJ IV PUSH: CPT

## 2019-01-01 PROCEDURE — 99284 EMERGENCY DEPT VISIT MOD MDM: CPT | Mod: 25

## 2019-01-01 PROCEDURE — 81003 URINALYSIS AUTO W/O SCOPE: CPT | Mod: ZL | Performed by: FAMILY MEDICINE

## 2019-01-01 PROCEDURE — 93922 UPR/L XTREMITY ART 2 LEVELS: CPT | Mod: TC

## 2019-01-01 PROCEDURE — 99239 HOSP IP/OBS DSCHRG MGMT >30: CPT | Performed by: INTERNAL MEDICINE

## 2019-01-01 PROCEDURE — 36415 COLL VENOUS BLD VENIPUNCTURE: CPT | Mod: ZL | Performed by: INTERNAL MEDICINE

## 2019-01-01 PROCEDURE — 99284 EMERGENCY DEPT VISIT MOD MDM: CPT | Mod: Z6 | Performed by: FAMILY MEDICINE

## 2019-01-01 PROCEDURE — 40000788 ZZHCL STATISTIC ESTIMATED AVERAGE GLUCOSE: Mod: ZL | Performed by: INTERNAL MEDICINE

## 2019-01-01 PROCEDURE — 80053 COMPREHEN METABOLIC PANEL: CPT | Mod: ZL | Performed by: INTERNAL MEDICINE

## 2019-01-01 PROCEDURE — 99213 OFFICE O/P EST LOW 20 MIN: CPT | Mod: 25 | Performed by: FAMILY MEDICINE

## 2019-01-01 PROCEDURE — 80053 COMPREHEN METABOLIC PANEL: CPT | Mod: ZL | Performed by: FAMILY MEDICINE

## 2019-01-01 PROCEDURE — 36415 COLL VENOUS BLD VENIPUNCTURE: CPT | Performed by: FAMILY MEDICINE

## 2019-01-01 PROCEDURE — 85025 COMPLETE CBC W/AUTO DIFF WBC: CPT | Mod: ZL | Performed by: FAMILY MEDICINE

## 2019-01-01 PROCEDURE — 71045 X-RAY EXAM CHEST 1 VIEW: CPT | Mod: TC

## 2019-01-01 PROCEDURE — 97112 NEUROMUSCULAR REEDUCATION: CPT | Mod: GP

## 2019-01-01 PROCEDURE — 99285 EMERGENCY DEPT VISIT HI MDM: CPT | Mod: Z6 | Performed by: INTERNAL MEDICINE

## 2019-01-01 PROCEDURE — 83880 ASSAY OF NATRIURETIC PEPTIDE: CPT | Mod: ZL | Performed by: FAMILY MEDICINE

## 2019-01-01 PROCEDURE — 99285 EMERGENCY DEPT VISIT HI MDM: CPT | Mod: 25

## 2019-01-01 PROCEDURE — 97161 PT EVAL LOW COMPLEX 20 MIN: CPT | Mod: GP

## 2019-01-01 PROCEDURE — 40000786 ZZHCL STATISTIC ACTIVE MRSA SURVEILLANCE CULTURE: Performed by: INTERNAL MEDICINE

## 2019-01-01 PROCEDURE — 85025 COMPLETE CBC W/AUTO DIFF WBC: CPT | Performed by: FAMILY MEDICINE

## 2019-01-01 PROCEDURE — 83880 ASSAY OF NATRIURETIC PEPTIDE: CPT | Performed by: PHYSICIAN ASSISTANT

## 2019-01-01 PROCEDURE — 97116 GAIT TRAINING THERAPY: CPT | Mod: GP

## 2019-01-01 PROCEDURE — 93306 TTE W/DOPPLER COMPLETE: CPT | Mod: 26 | Performed by: INTERNAL MEDICINE

## 2019-01-01 PROCEDURE — 25000132 ZZH RX MED GY IP 250 OP 250 PS 637: Mod: GY | Performed by: INTERNAL MEDICINE

## 2019-01-01 RX ORDER — LIDOCAINE HYDROCHLORIDE 10 MG/ML
6 INJECTION, SOLUTION INFILTRATION; PERINEURAL ONCE
Status: ACTIVE | OUTPATIENT
Start: 2019-01-01

## 2019-01-01 RX ORDER — LISINOPRIL 10 MG/1
10 TABLET ORAL DAILY
Qty: 90 TABLET | Refills: 1 | Status: SHIPPED | OUTPATIENT
Start: 2019-01-01 | End: 2019-01-01

## 2019-01-01 RX ORDER — DOXAZOSIN 4 MG/1
4 TABLET ORAL DAILY
Qty: 90 TABLET | Refills: 3 | Status: SHIPPED | OUTPATIENT
Start: 2019-01-01 | End: 2019-01-01

## 2019-01-01 RX ORDER — TRIAMCINOLONE ACETONIDE 40 MG/ML
40 INJECTION, SUSPENSION INTRA-ARTICULAR; INTRAMUSCULAR ONCE
Status: ACTIVE | OUTPATIENT
Start: 2019-01-01

## 2019-01-01 RX ORDER — ACETAMINOPHEN 650 MG/1
650 SUPPOSITORY RECTAL EVERY 4 HOURS PRN
Status: DISCONTINUED | OUTPATIENT
Start: 2019-01-01 | End: 2019-01-01 | Stop reason: HOSPADM

## 2019-01-01 RX ORDER — LISINOPRIL 10 MG/1
5 TABLET ORAL DAILY
Qty: 15 TABLET | Refills: 0 | Status: SHIPPED | OUTPATIENT
Start: 2019-01-01 | End: 2019-01-01

## 2019-01-01 RX ORDER — ONDANSETRON 4 MG/1
4 TABLET, ORALLY DISINTEGRATING ORAL EVERY 6 HOURS PRN
Status: DISCONTINUED | OUTPATIENT
Start: 2019-01-01 | End: 2019-01-01 | Stop reason: HOSPADM

## 2019-01-01 RX ORDER — METOPROLOL SUCCINATE 25 MG/1
25 TABLET, EXTENDED RELEASE ORAL DAILY
Status: DISCONTINUED | OUTPATIENT
Start: 2019-01-01 | End: 2019-01-01 | Stop reason: HOSPADM

## 2019-01-01 RX ORDER — FUROSEMIDE 40 MG
40 TABLET ORAL DAILY
Qty: 30 TABLET | Refills: 11 | Status: SHIPPED | OUTPATIENT
Start: 2019-01-01 | End: 2019-01-01 | Stop reason: ALTCHOICE

## 2019-01-01 RX ORDER — FERROUS SULFATE 325(65) MG
325 TABLET ORAL DAILY
Qty: 30 TABLET | Refills: 0 | Status: SHIPPED | OUTPATIENT
Start: 2019-01-01

## 2019-01-01 RX ORDER — FERROUS SULFATE 325(65) MG
325 TABLET ORAL DAILY
Status: DISCONTINUED | OUTPATIENT
Start: 2019-01-01 | End: 2019-01-01 | Stop reason: HOSPADM

## 2019-01-01 RX ORDER — TORSEMIDE 10 MG/1
10 TABLET ORAL DAILY
Qty: 90 TABLET | Refills: 3 | Status: SHIPPED | OUTPATIENT
Start: 2019-01-01 | End: 2019-01-01

## 2019-01-01 RX ORDER — DOXAZOSIN 2 MG/1
2 TABLET ORAL AT BEDTIME
Qty: 90 TABLET | Refills: 3 | COMMUNITY
Start: 2019-01-01 | End: 2019-01-01 | Stop reason: ALTCHOICE

## 2019-01-01 RX ORDER — LIDOCAINE 40 MG/G
CREAM TOPICAL
Status: DISCONTINUED | OUTPATIENT
Start: 2019-01-01 | End: 2019-01-01 | Stop reason: HOSPADM

## 2019-01-01 RX ORDER — NALOXONE HYDROCHLORIDE 0.4 MG/ML
.1-.4 INJECTION, SOLUTION INTRAMUSCULAR; INTRAVENOUS; SUBCUTANEOUS
Status: DISCONTINUED | OUTPATIENT
Start: 2019-01-01 | End: 2019-01-01 | Stop reason: HOSPADM

## 2019-01-01 RX ORDER — ACETAMINOPHEN 325 MG/1
650 TABLET ORAL EVERY 4 HOURS PRN
Status: DISCONTINUED | OUTPATIENT
Start: 2019-01-01 | End: 2019-01-01 | Stop reason: HOSPADM

## 2019-01-01 RX ORDER — DOXAZOSIN 4 MG/1
4 TABLET ORAL AT BEDTIME
COMMUNITY
End: 2019-01-01

## 2019-01-01 RX ORDER — SODIUM CHLORIDE 9 MG/ML
INJECTION, SOLUTION INTRAVENOUS CONTINUOUS
Status: DISCONTINUED | OUTPATIENT
Start: 2019-01-01 | End: 2019-01-01

## 2019-01-01 RX ORDER — LISINOPRIL 5 MG/1
5 TABLET ORAL DAILY
Qty: 90 TABLET | Refills: 3
Start: 2019-01-01

## 2019-01-01 RX ORDER — TORSEMIDE 10 MG/1
10 TABLET ORAL DAILY
Qty: 90 TABLET | Refills: 3 | Status: SHIPPED | OUTPATIENT
Start: 2019-01-01

## 2019-01-01 RX ORDER — SODIUM CHLORIDE 9 MG/ML
INJECTION, SOLUTION INTRAVENOUS CONTINUOUS
Status: DISCONTINUED | OUTPATIENT
Start: 2019-01-01 | End: 2019-01-01 | Stop reason: HOSPADM

## 2019-01-01 RX ORDER — TORSEMIDE 20 MG/1
20 TABLET ORAL DAILY
Qty: 30 TABLET | Refills: 11 | Status: SHIPPED | OUTPATIENT
Start: 2019-01-01 | End: 2019-01-01 | Stop reason: DRUGHIGH

## 2019-01-01 RX ORDER — ONDANSETRON 2 MG/ML
4 INJECTION INTRAMUSCULAR; INTRAVENOUS EVERY 6 HOURS PRN
Status: DISCONTINUED | OUTPATIENT
Start: 2019-01-01 | End: 2019-01-01 | Stop reason: HOSPADM

## 2019-01-01 RX ORDER — FUROSEMIDE 40 MG
20 TABLET ORAL DAILY
Qty: 15 TABLET | Refills: 0 | Status: SHIPPED | OUTPATIENT
Start: 2019-01-01 | End: 2019-01-01

## 2019-01-01 RX ORDER — TRIAMCINOLONE ACETONIDE 40 MG/ML
40 INJECTION, SUSPENSION INTRA-ARTICULAR; INTRAMUSCULAR ONCE
Status: COMPLETED | OUTPATIENT
Start: 2019-01-01 | End: 2019-01-01

## 2019-01-01 RX ORDER — WARFARIN SODIUM 2.5 MG/1
TABLET ORAL
Qty: 110 TABLET | Refills: 3 | Status: ON HOLD | OUTPATIENT
Start: 2019-01-01 | End: 2019-01-01

## 2019-01-01 RX ADMIN — FUROSEMIDE 2 MG/HR: 10 INJECTION, SOLUTION INTRAVENOUS at 18:46

## 2019-01-01 RX ADMIN — SODIUM CHLORIDE 500 ML: 9 INJECTION, SOLUTION INTRAVENOUS at 09:37

## 2019-01-01 RX ADMIN — BENZOCAINE AND MENTHOL 1 LOZENGE: 15; 3.6 LOZENGE ORAL at 12:18

## 2019-01-01 RX ADMIN — TRIAMCINOLONE ACETONIDE 40 MG: 40 INJECTION, SUSPENSION INTRA-ARTICULAR; INTRAMUSCULAR at 11:46

## 2019-01-01 RX ADMIN — SODIUM CHLORIDE: 9 INJECTION, SOLUTION INTRAVENOUS at 23:34

## 2019-01-01 RX ADMIN — SODIUM CHLORIDE: 9 INJECTION, SOLUTION INTRAVENOUS at 20:12

## 2019-01-01 RX ADMIN — METOPROLOL SUCCINATE 25 MG: 25 TABLET, EXTENDED RELEASE ORAL at 09:16

## 2019-01-01 RX ADMIN — SODIUM CHLORIDE 1000 ML: 9 INJECTION, SOLUTION INTRAVENOUS at 21:06

## 2019-01-01 RX ADMIN — FERROUS SULFATE TAB 325 MG (65 MG ELEMENTAL FE) 325 MG: 325 (65 FE) TAB at 09:13

## 2019-01-01 RX ADMIN — APIXABAN 2.5 MG: 2.5 TABLET, FILM COATED ORAL at 09:13

## 2019-01-01 RX ADMIN — METOPROLOL SUCCINATE 25 MG: 25 TABLET, EXTENDED RELEASE ORAL at 08:33

## 2019-01-01 RX ADMIN — SODIUM CHLORIDE: 9 INJECTION, SOLUTION INTRAVENOUS at 05:48

## 2019-01-01 RX ADMIN — PANTOPRAZOLE SODIUM 40 MG: 40 INJECTION, POWDER, FOR SOLUTION INTRAVENOUS at 21:05

## 2019-01-01 RX ADMIN — APIXABAN 2.5 MG: 2.5 TABLET, FILM COATED ORAL at 20:59

## 2019-01-01 ASSESSMENT — ENCOUNTER SYMPTOMS
ARTHRALGIAS: 1
ABDOMINAL PAIN: 0
ABDOMINAL PAIN: 0
FEVER: 0
CONFUSION: 0
ACTIVITY CHANGE: 0
NECK PAIN: 0
CHEST TIGHTNESS: 0
HEADACHES: 0
COLOR CHANGE: 0
FEVER: 0
FREQUENCY: 1
LIGHT-HEADEDNESS: 0
APPETITE CHANGE: 0
CONSTIPATION: 0
ARTHRALGIAS: 0
SHORTNESS OF BREATH: 0
EYE REDNESS: 0
HEMATOCHEZIA: 1
DIFFICULTY URINATING: 0
NAUSEA: 0
DIAPHORESIS: 0
DIZZINESS: 0
BRUISES/BLEEDS EASILY: 1
DYSURIA: 0
ABDOMINAL PAIN: 0
HEADACHES: 0
WEAKNESS: 1
WEAKNESS: 0
DIZZINESS: 0
NAUSEA: 0
PALPITATIONS: 0
NECK STIFFNESS: 0
VOMITING: 0
DIARRHEA: 0
ACTIVITY CHANGE: 1
SHORTNESS OF BREATH: 1
CONFUSION: 0
FATIGUE: 0
FATIGUE: 1
VOMITING: 0
FEVER: 0
DIARRHEA: 0
COUGH: 0
LIGHT-HEADEDNESS: 0

## 2019-01-01 ASSESSMENT — PAIN SCALES - GENERAL
PAINLEVEL: NO PAIN (0)

## 2019-01-01 ASSESSMENT — MIFFLIN-ST. JEOR
SCORE: 1341.71
SCORE: 1342.63
SCORE: 1373.46
SCORE: 1355.31
SCORE: 1355.31
SCORE: 1359.85

## 2019-01-01 ASSESSMENT — ACTIVITIES OF DAILY LIVING (ADL)
ADLS_ACUITY_SCORE: 29
ADLS_ACUITY_SCORE: 30
ADLS_ACUITY_SCORE: 29
ADLS_ACUITY_SCORE: 30
ADLS_ACUITY_SCORE: 30

## 2019-01-14 NOTE — ED PROVIDER NOTES
History     Chief Complaint   Patient presents with     Generalized Weakness     HPI  Mary Irizarry is a 86 year old male who presents the emergency room with generalized weakness.  He apparently has been having difficulty at home, today was feeling dizzy and unable to function.  Patient has chronic diastolic heart failure, also has a history of pulmonary embolism.  He has atrial fibrillation that is been present for about 4 months, currently his edema is not as bad as usual.  He is alert and oriented.    Problem List:    Patient Active Problem List    Diagnosis Date Noted     Dyspnea on exertion 11/26/2018     Priority: Medium     Sedentary lifestyle 10/22/2018     Priority: Medium     Localized edema 10/22/2018     Priority: Medium     New onset a-fib diagnosed 10/22/18 10/22/2018     Priority: Medium     History of pulmonary embolism-bilateral on a CT scan from 1/10/18 10/22/2018     Priority: Medium     LAE (left atrial enlargement)-mild 10/22/2018     Priority: Medium     On Coumadin for atrial fibrillation 10/22/2018     Priority: Medium     Chronic kidney disease, stage 2 (mild) 10/22/2018     Priority: Medium     Chronic diastolic heart failure (H)  10/22/2018     Priority: Medium     Other fatigue 10/22/2018     Priority: Medium     Diastolic dysfunction 10/09/2018     Priority: Medium     Acute respiratory failure with hypoxia (H) 01/12/2018     Priority: Medium     Hypoxia 01/11/2018     Priority: Medium     Elevated troponin I level 01/11/2018     Priority: Medium     Lesion of left pinna 09/11/2017     Priority: Medium     Essential hypertension, benign 09/11/2017     Priority: Medium     ACP (advance care planning) 08/16/2016     Priority: Medium     Advance Care Planning 8/16/2016: ACP Review of Chart / Resources Provided:  Reviewed chart for advance care plan.  Mary Irizarry has no plan or code status on file. Discussed available resources and declined information.Carlie Alicea              Facial lesion 2015     Priority: Medium     Left inguinal hernia 2015     Priority: Medium        Past Medical History:    Past Medical History:   Diagnosis Date     Other fatigue 10/22/2018       Past Surgical History:    Past Surgical History:   Procedure Laterality Date     GENITOURINARY SURGERY      prostatectomy       Family History:    Family History   Problem Relation Age of Onset     Hypertension Mother      Unknown/Adopted Father      No Known Problems Maternal Grandmother      No Known Problems Maternal Grandfather      No Known Problems Paternal Grandmother      No Known Problems Paternal Grandfather      No Known Problems Sister      No Known Problems Son      No Known Problems Son      Deep Vein Thrombosis (DVT) Son        Social History:  Marital Status:   [2]  Social History     Tobacco Use     Smoking status: Former Smoker     Types: Cigarettes     Start date: 1949     Last attempt to quit: 3/1/1949     Years since quittin.9     Smokeless tobacco: Never Used   Substance Use Topics     Alcohol use: Yes     Alcohol/week: 0.0 oz     Comment: occa     Drug use: No        Medications:      Cholecalciferol (VITAMIN D-3 PO)   Cyanocobalamin (B-12) 1000 MCG CAPS   doxazosin (CARDURA) 8 MG tablet   folic acid (FOLVITE) 1 MG tablet   furosemide (LASIX) 40 MG tablet   lisinopril (PRINIVIL/ZESTRIL) 10 MG tablet   metoprolol succinate (TOPROL-XL) 25 MG 24 hr tablet   Pyridoxine HCl (VITAMIN B6 PO)   warfarin (COUMADIN) 2.5 MG tablet         Review of Systems   Constitutional: Positive for activity change and fatigue. Negative for fever.   HENT: Negative.    Respiratory: Positive for shortness of breath.    Cardiovascular: Positive for leg swelling. Negative for chest pain and palpitations.   Gastrointestinal: Negative for abdominal pain, constipation, diarrhea, nausea and vomiting.        Diastases recti present   Genitourinary: Positive for frequency. Negative for dysuria and urgency.    Musculoskeletal: Positive for arthralgias. Negative for gait problem and neck pain.        As usual per patient.   Neurological: Positive for weakness. Negative for headaches.   Psychiatric/Behavioral: Negative for confusion.       Physical Exam   BP: 94/57  Heart Rate: 73  Temp: 98.2  F (36.8  C)  Resp: 18  Weight: 68 kg (150 lb)  SpO2: 96 %      Physical Exam   Constitutional: He is oriented to person, place, and time. He appears well-developed and well-nourished. No distress.   HENT:   Head: Normocephalic and atraumatic.   Neck: Normal range of motion. Neck supple.   Cardiovascular: Normal rate, normal heart sounds and intact distal pulses. An irregularly irregular rhythm present.   No murmur heard.  Pulmonary/Chest: Effort normal and breath sounds normal. No respiratory distress. He has no wheezes.   Abdominal: Soft. Bowel sounds are normal. He exhibits no distension. There is no tenderness.   Moderate sized midline diastases recti.  3 cm lipoma at the right costal margin.   Neurological: He is alert and oriented to person, place, and time.   Skin: Skin is warm and dry. Capillary refill takes less than 2 seconds.   Psychiatric: He has a normal mood and affect.   Nursing note and vitals reviewed.      ED Course   Procedures         EKG Interpretation:      Interpreted by Marion Soto  Time reviewed: 0950  Symptoms at time of EKG: hypotension   Rhythm: atrial fibrillation - controlled  Rate: Normal  Axis: Left Axis Deviation  Ectopy: none  Conduction: normal  ST Segments/ T Waves: No ST-T wave changes  Q Waves: v1, III and aVf  Comparison to prior: Unchanged from 10/2018    Clinical Impression: no acute changes and non-specific EKG    Results for orders placed or performed during the hospital encounter of 01/14/19 (from the past 24 hour(s))   Basic metabolic panel   Result Value Ref Range    Sodium 134 133 - 144 mmol/L    Potassium 4.2 3.4 - 5.3 mmol/L    Chloride 98 94 - 109 mmol/L    Carbon  Dioxide 27 20 - 32 mmol/L    Anion Gap 9 3 - 14 mmol/L    Glucose 70 70 - 99 mg/dL    Urea Nitrogen 77 (H) 7 - 30 mg/dL    Creatinine 2.28 (H) 0.66 - 1.25 mg/dL    GFR Estimate 25 (L) >60 mL/min/[1.73_m2]    GFR Estimate If Black 29 (L) >60 mL/min/[1.73_m2]    Calcium 8.1 (L) 8.5 - 10.1 mg/dL   CBC with platelets differential   Result Value Ref Range    WBC 7.7 4.0 - 11.0 10e9/L    RBC Count 3.75 (L) 4.4 - 5.9 10e12/L    Hemoglobin 10.2 (L) 13.3 - 17.7 g/dL    Hematocrit 31.3 (L) 40.0 - 53.0 %    MCV 84 78 - 100 fl    MCH 27.2 26.5 - 33.0 pg    MCHC 32.6 31.5 - 36.5 g/dL    RDW 19.2 (H) 10.0 - 15.0 %    Platelet Count 133 (L) 150 - 450 10e9/L    Diff Method Automated Method     % Neutrophils 70.0 %    % Lymphocytes 20.2 %    % Monocytes 9.1 %    % Eosinophils 0.3 %    % Basophils 0.1 %    % Immature Granulocytes 0.3 %    Nucleated RBCs 0 0 /100    Absolute Neutrophil 5.4 1.6 - 8.3 10e9/L    Absolute Lymphocytes 1.6 0.8 - 5.3 10e9/L    Absolute Monocytes 0.7 0.0 - 1.3 10e9/L    Absolute Eosinophils 0.0 0.0 - 0.7 10e9/L    Absolute Basophils 0.0 0.0 - 0.2 10e9/L    Abs Immature Granulocytes 0.0 0 - 0.4 10e9/L    Absolute Nucleated RBC 0.0    Nt probnp inpatient   Result Value Ref Range    N-Terminal Pro BNP Inpatient 4,175 (H) 0 - 1,800 pg/mL   Troponin I   Result Value Ref Range    Troponin I ES 0.055 (H) 0.000 - 0.045 ug/L   Troponin I (second draw)   Result Value Ref Range    Troponin I ES 0.052 (H) 0.000 - 0.045 ug/L       Medications   0.9% sodium chloride BOLUS (0 mLs Intravenous Stopped 1/14/19 1040)       Assessments & Plan (with Medical Decision Making)   Troponin actually went down on 3-hour check.  Patient's creatinine is nearly doubled since his last value so he is too dry.  Spoke with the patient and his wife, they will go ahead and cut down his Lasix we will also cut his lisinopril in half for the time being.  He will follow-up with Dr. Bryant and may need further adjustments at that time.  His  appointment is set up.    I have reviewed the nursing notes.    I have reviewed the findings, diagnosis, plan and need for follow up with the patient.     Medication List      Modified    furosemide 40 MG tablet  Commonly known as:  LASIX  20 mg, Oral, DAILY  What changed:  how much to take     lisinopril 10 MG tablet  Commonly known as:  PRINIVIL/ZESTRIL  5 mg, Oral, DAILY  What changed:  how much to take            Final diagnoses:   Dehydration   Other specified hypotension       1/14/2019   [facility]     Marion Alexis MD  01/14/19 [id number]

## 2019-01-14 NOTE — TELEPHONE ENCOUNTER
Received a voicemail from the ER. Requesting refills to be sent in for patients lisinopril. Order pending   MATIAS JORDAN

## 2019-01-14 NOTE — ED NOTES
Patient to ED via [facility]. Pt wife had called 9-1-1 this morning because pt wasn't walking well and was unsteady. EMS reported pt was standing and walking when they arrived. On arrival pt is alert and ornt. FAST negative. Pt denies any pains.  Pt doesn't know why his wife called the ambulance.  IV was started prior to arrival.

## 2019-01-14 NOTE — PROGRESS NOTES
Emergency Department Assessment  Reason for Referral: [facility]  PCP: JABIER Kent  Specialists or MH providers: no  Pharmacy: [facility]  Medication routine/concerns Wife manages medications  Cognitive Behavioral Status: awake and alert    Affect and Mood Status: pleasant cooperative,     Problems sleeping: no    Mental Health History: no  Recent Stressors: denies    Case Manger/: no    Support System: Wife.  Has 3 children in the [facility]  Transportation: wife drives    Current Living Situation:    Home Setting[place]   Living Situation:Spouse   Function Status/Assistive Device: no assistive devices    Employment/Financial/Insurance Concerns:retired     No Insurance issues identified     Patient's insurance coverage: [unfilled]     [facility] Yes has an appt on Feb 6th [place]    Previous Services at Home or in the Community:none  Falls at home?: no    ADL's:wife assists  Equipment at home?: none  O2: no  Smoker: no  ETOH: no  THC/Drugs: no    Any Violence in the home?: no  Do you feel safe at home?: yes      Plan: home    Provided wife with senior guide and discussed getting equip [facility] as he has app[facility] on [facility]-06.  Discussed life alert for safety.  Pt's wife works 3 hours a day and so far patient has been ok at home alone.  Sleeps a lot she reports.  No other concerns at this time.  Wife does all plowing and driving etc.  She is in very good shape.      [facility]   Care Transitions

## 2019-01-14 NOTE — ED AVS SNAPSHOT
[facility]  [address]  [place] [address]  Phone:  [phone]                                    Mary Irizarry   MRN: [id number]    Department:  [facility]   Date of Visit:  1/14/2019           After Visit Summary Signature Page    I have received my discharge instructions, and my questions have been answered. I have discussed any challenges I see with this plan with the nurse or doctor.    ..........................................................................................................................................  Patient/Patient Representative Signature      ..........................................................................................................................................  Patient Representative Print Name and Relationship to Patient    ..................................................               ................................................  Date                                   Time    ..........................................................................................................................................  Reviewed by Signature/Title    ...................................................              ..............................................  Date                                               Time          22EPIC Rev 08/18

## 2019-01-14 NOTE — ED NOTES
Discharge instructions reviewed with patient. Encouraged to return with new or worsening symptoms. Rx e-scribed to pharmacy of choice. Copy of AVS given to family.  Pt understand meds changes.  Pt reports he feels better and is ready to be discharged. No questions or concerns.

## 2019-01-21 PROBLEM — I51.89 DIASTOLIC DYSFUNCTION: Status: ACTIVE | Noted: 2018-01-01

## 2019-01-21 PROBLEM — M62.81 GENERALIZED MUSCLE WEAKNESS: Status: ACTIVE | Noted: 2019-01-01

## 2019-01-21 PROBLEM — R60.0 LOWER EXTREMITY EDEMA: Status: ACTIVE | Noted: 2019-01-01

## 2019-01-21 PROBLEM — I48.0 PAROXYSMAL ATRIAL FIBRILLATION (H): Status: ACTIVE | Noted: 2019-01-01

## 2019-01-21 NOTE — PROGRESS NOTES
Cardiology Progress Note     Assessment & Plan   Mary Irizarry is a 86 year old male who is being seen in follow-up to visit from 11/26/18.  He was seen in the ER 1/14/19 for acute renal failure and generalized weakness.  He has been followed secondary to diastolic dysfunction grade 1 on 1/10/18, paroxysmal atrial fibrillation on Coumadin and metoprolol, mild lower extremity edema, and mild left atrial enlargement.  He was identified as having new onset atrial fibrillation on 10/22/18. His echo from 1/18/18 showed diastolic dysfunction grade 1 and has since been on Lasix 40 mg which was an increase from 20 mg daily on 10/22/18. His swelling had improved significantly and his shortness of breath essentially resolved.  He was seen in the ER on 1/14/19 with generalized weakness, worse to his left upper shoulder secondary to arthritis and pain.  He has been doing physical therapy.  He was found to have a sizable jump in his kidney function from 1.10 on 10/30/18 to 2.28 on 1/14/19.  He has been on Lasix 40 mg daily and lisinopril 10 mg daily.  At his ER visit, Lasix was decreased to 20 mg alternating with 40 mg daily.  Also, lisinopril was cut down to 5 mg daily.  He has not had a recheck of labs at this point.  He remains on Coumadin for atrial fibrillation and concerns for a moderate clot burden to bilateral lobes of his lung on a CTA from 1/10/18.  [facility]     Impression:  1.  Shortness of breath likely secondary to diastolic dysfunction grade 1 on an echo from 1/11/18.  2.  Fatigue potentially secondary to atrial fibrillation versus DHARMESH.  3.  Persistent atrial fibrillation diagnosed on 10/20/18.  4.  Hypertension.  5.  History of bilateral PEs on a CT scan from 1/10/18 currently on Coumadin.  6.  History of tobacco abuse at age 18 for approximately 2 months.  7.  Lower extremity edema.  8.  On Coumadin for atrial fibrillation.  9.  Mild left atrial  enlargement  10.  Chronic diastolic dysfunction grade 1 on 1/11/18.  11.  Chronic kidney disease stage II.  12.  Sedentary lifestyle.  13.  Dyspnea on exertion.     Plan:   1.  He will continue on Lasix 20 mg daily alternating with 40 mg daily which is a change during his last ER visit.  2.  He will continue lisinopril 5 mg daily which was decreased during his last ER visit on 1/14/19.  3.  Doxazosin will be decreased from 8 mg daily to 4 mg daily.  4.  He is to watch his salt intake, fluid intake, and weigh himself on a daily basis.  5.  He will have labs today which will include a BMP, BNP, and A1c.  6.  He will be seen in May 2019 as planned.      Preston Bryant      Physical Exam   Temp: 96.8  F (36  C) Temp src: Tympanic BP: 109/65 Pulse: 77   Resp: 16 SpO2: 96 %      Vitals:    01/21/19 0908   Weight: 68.9 kg (152 lb)     Vital Signs with Ranges  Temp:  [96.8  F (36  C)] 96.8  F (36  C)  Pulse:  [72-77] 77  Resp:  [16] 16  BP: (109)/(65) 109/65  SpO2:  [96 %] 96 %  ROS is negative except that which was noted in the HPI.          Constitutional: awake, alert, cooperative, no apparent distress, and appears stated age  Eyes: Lids and lashes normal, pupils equal, sclera clear, conjunctiva normal  ENT: Normocephalic, without obvious abnormality, atraumatic.  Respiratory: No increased work of breathing, good air exchange, clear to auscultation bilaterally, no crackles or wheezing  Cardiovascular: Normal apical impulse, regular rate and rhythm, normal S1 and S2, no S3 or S4, and no murmur noted  Musculoskeletal: no lower extremity pitting edema present  Neurologic: Awake, alert, oriented to name, place and time.  .  Neuropsychiatric: General: normal, calm and normal eye contact    Medications         Data   No results found for this or any previous visit (from the past 24 hour(s)).  No results found for this or any previous visit (from the past 24 hour(s)).

## 2019-01-21 NOTE — PATIENT INSTRUCTIONS
You were seen by Dr. Bryant, 1/21/2019.     1.  Please decrease the dose of Doxazosin to 4 mg daily.       2.  If you develop new or worsening symptoms please call the cardiology office as you may need to be see sooner.     3.  Please monitor you weight daily.  If you experience a 2-3 pound increase in weight in  24 hours, or 5 pounds in one week. Please call the cardiology office as you may need your medications adjusted or you may need to be seen.      4.  Please limit sodium to 2500 mg daily.  Limit fluid to 2 liters per day (64 ounces).  This will help to control the symptoms of heart failure and swelling in the extremities.     5. You will have lab 1/21/2019, you will be notified when these results become available.     6. Please continue all other medications as they have been prescribed.       You will follow up with Dr. Bryant May 29. 2019.       Please call the cardiology office with problems, questions, or concerns at [phone].    If you experience chest pain, chest pressure, chest tightness, shortness of breath, fainting, lightheadedness, nausea, vomiting, or other concerning symptoms, please report to the Emergency Department or call 911. These symptoms may be emergent, and best treated in the Emergency Department.       Anisa BHAT RN-BSN  Cardiology   [facility]  [phone]      Patient Education     Low-Salt Choices  Eating salt (sodium) can make your body retain too much water. Excess water makes your heart work harder. Canned, packaged, and frozen foods are easy to prepare. But they are often high in sodium. Here are some ideas for low-salt foods you can easily make yourself.    For breakfast    Fruit or 100% fruit juice    Whole-wheat bread or an English muffin. Look for sodium content on Nutrition Facts labels.    Low-fat milk or yogurt    Unsalted eggs    Shredded wheat    Corn tortillas    Unsalted steamed rice    Regular (not instant) hot cereal, made without salt  Stay away  from:    Sausage, lara, and ham    Flour tortillas    Packaged muffins, pancakes, and biscuits    Instant hot cereals    Cottage cheese  For lunch and dinner    Fresh fish, chicken, turkey, or meat--baked, broiled, or roasted without salt    Dry beans, cooked without salt    Tofu, stir-fried without salt    Unsalted fresh fruit and vegetables, or frozen or canned fruit and vegetables with no added salt  Stay away from:    Lunch or deli meat that is cured or smoked    Cheese    Tomato juice and ketchup    Canned vegetables, soups, and fish not labeled as no-salt-added or reduced sodium    Packaged gravies and sauces    Olives, pickles, and relish    Bottled salad dressings  For snacks and desserts    Yogurt    Unsalted, air-popped popcorn    Unsalted nuts or seeds  Stay away from:    Pies and cakes    Packaged dessert mixes    Pizza    Canned and packaged puddings    Pretzels, chips, crackers, and nuts--unless the label says unsalted  Date Last Reviewed: 6/1/2017 2000-2018 [employer]. [address] [place] [address]. All rights reserved. This information is not intended as a substitute for professional medical care. Always follow your healthcare professional's instructions.           Patient Education     Low-Salt Diet  This diet removes foods that are high in salt. It also limits the amount of salt you use when cooking. It is most often used for people with high blood pressure, edema (fluid retention), and kidney, liver, or heart disease.  Table salt contains the mineral sodium. Your body needs sodium to work normally. But too much sodium can make your health problems worse. Your healthcare provider is recommending a low-salt (also called low-sodium) diet for you. Your total daily allowance of salt is 1,500 to 2,300 milligrams (mg). It is less than 1 teaspoon of table salt. This means you can have only about 500 to 700 mg of sodium at each meal. People with certain health problems should  limit salt intake to the lower end of the recommended range.    When you cook, don t add much salt. If you can cook without using salt, even better. Don t add salt to your food at the table.  When shopping, read food labels. Salt is often called sodium on the label. Choose foods that are salt-free, low salt, or very low salt. Note that foods with reduced salt may not lower your salt intake enough.    Beans, potatoes, and pasta  Ok: Dry beans, split peas, lentils, potatoes, rice, macaroni, pasta, spaghetti without added salt  Avoid: Potato chips, tortilla chips, and similar products  Breads and cereals  Ok: Low-sodium breads, rolls, cereals, and cakes; low-salt crackers, matzo crackers  Avoid: Salted crackers, pretzels, popcorn, Armenian toast, pancakes, muffins  Dairy  Ok: Milk, chocolate milk, hot chocolate mix, low-salt cheeses, and yogurt  Avoid: Processed cheese and cheese spreads; Roquefort, Camembert, and cottage cheese; buttermilk, instant breakfast drink  Desserts  Ok: Ice cream, frozen yogurt, juice bars, gelatin, cookies and pies, sugar, honey, jelly, hard candy  Avoid: Most pies, cakes and cookies prepared or processed with salt; instant pudding  Drinks  Ok: Tea, coffee, fizzy (carbonated) drinks, juices  Avoid: Flavored coffees, electrolyte replacement drinks, sports drinks  Meats  Ok: All fresh meat, fish, poultry, low-salt tuna, eggs, egg substitute  Avoid: Smoked, pickled, brine-cured, or salted meats and fish. This includes lara, chipped beef, corned beef, hot dogs, deli meats, ham, kosher meats, salt pork, sausage, canned tuna, salted codfish, smoked salmon, herring, sardines, or anchovies.  Seasonings and spices  Ok: Most seasonings are okay. Good substitutes for salt include: fresh herb blends, hot sauce, lemon, garlic, wasserman, vinegar, dry mustard, parsley, cilantro, horseradish, tomato paste, regular margarine, mayonnaise, unsalted butter, cream cheese, vegetable oil, cream, low-salt salad  dressing and gravy.  Avoid: Regular ketchup, relishes, pickles, soy sauce, teriyaki sauce, Worcestershire sauce, BBQ sauce, tartar sauce, meat tenderizer, chili sauce, regular gravy, regular salad dressing, salted butter  Soups  Ok: Low-salt soups and broths made with allowed foods  Avoid: Bouillon cubes, soups with smoked or salted meats, regular soup and broth  Vegetables  Ok: Most vegetables are okay; also low-salt tomato and vegetable juices  Avoid: Sauerkraut and other brine-soaked vegetables; pickles and other pickled vegetables; tomato juice, olives  Date Last Reviewed: 8/1/2016 2000-2018 [employer]. [address] [place] [address], [place] [address]. All rights reserved. This information is not intended as a substitute for professional medical care. Always follow your healthcare professional's instructions.

## 2019-01-21 NOTE — NURSING NOTE
"Chief Complaint   Patient presents with     RECHECK     ER follow-up and 2 month cardiology follow-up;  Patient states that his \"legs were weak this morning but not as bad as when he was in the ER last week\".  Patient states his \"left side of his body is weaker than his right side for the past couple of years\".         Initial /65 (BP Location: Left arm, Patient Position: Chair, Cuff Size: Adult Regular)   Pulse 77   Temp 96.8  F (36  C) (Tympanic)   Resp 16   Ht 1.753 m (5' 9\")   Wt 68.9 kg (152 lb)   SpO2 96%   BMI 22.45 kg/m   Estimated body mass index is 22.45 kg/m  as calculated from the following:    Height as of this encounter: 1.753 m (5' 9\").    Weight as of this encounter: 68.9 kg (152 lb).  Medication Reconciliation: complete    Meredith Rodriguez LPN    "

## 2019-01-25 NOTE — PROGRESS NOTES
ANTICOAGULATION FOLLOW-UP CLINIC VISIT    Patient Name:  Mary Irizarry  Date:  2019  Contact Type:  Face to Face    SUBJECTIVE:     Patient Findings     Comments:   Has had a few ER visits recently with increased CHF symptoms. Recent eval by cardiology and was put on lasix for fluid retention.           OBJECTIVE    INR Protime   Date Value Ref Range Status   2019 2.5 (A) 0.86 - 1.14 Final       ASSESSMENT / PLAN  INR assessment THER    Recheck INR In: 4 WEEKS    INR Location Clinic      Anticoagulation Summary  As of 2019    INR goal:   2.0-3.0   TTR:   57.8 % (1 y)   INR used for dosin.5 (2019)   Warfarin maintenance plan:   2.5 mg (2.5 mg x 1) every day   Full warfarin instructions:   2.5 mg every day   Weekly warfarin total:   17.5 mg   No change documented:   Marylou Joshi RN   Plan last modified:   Marylou Joshi RN (2018)   Next INR check:   2019   Target end date:       Indications    History of pulmonary embolism-bilateral on a CT scan from 1/10/18 [Z86.711]             Anticoagulation Episode Summary     INR check location:       Preferred lab:       Send INR reminders to:   [facility] ANTICOAG POOL    Comments:         Anticoagulation Care Providers     Provider Role Specialty Phone number    JABIER Kent MD [facility] [phone]            See the Encounter Report to view Anticoagulation Flowsheet and Dosing Calendar (Go to Encounters tab in chart review, and find the Anticoagulation Therapy Visit)        Marylou Joshi RN

## 2019-02-22 NOTE — PROGRESS NOTES
ANTICOAGULATION FOLLOW-UP CLINIC VISIT    Patient Name:  Mary Irizarry  Date:  2019  Contact Type:  Face to Face    SUBJECTIVE:     Patient Findings     Positives:   No Problem Findings           OBJECTIVE    INR Protime   Date Value Ref Range Status   2019 2.3 (A) 0.86 - 1.14 Final       ASSESSMENT / PLAN  INR assessment THER    Recheck INR In: 6 WEEKS    INR Location Clinic      Anticoagulation Summary  As of 2019    INR goal:   2.0-3.0   TTR:   60.8 % (1.1 y)   INR used for dosin.3 (2019)   Warfarin maintenance plan:   2.5 mg (2.5 mg x 1) every day   Full warfarin instructions:   2.5 mg every day   Weekly warfarin total:   17.5 mg   No change documented:   Marylou Joshi RN   Plan last modified:   Marylou Joshi RN (2018)   Next INR check:   2019   Target end date:       Indications    History of pulmonary embolism-bilateral on a CT scan from 1/10/18 [Z86.711]             Anticoagulation Episode Summary     INR check location:       Preferred lab:       Send INR reminders to:   [facility] ANTICOAG POOL    Comments:         Anticoagulation Care Providers     Provider Role Specialty Phone number    JABIER Kent MD [facility] [phone]            See the Encounter Report to view Anticoagulation Flowsheet and Dosing Calendar (Go to Encounters tab in chart review, and find the Anticoagulation Therapy Visit)        Marylou Joshi RN

## 2019-04-05 NOTE — PROGRESS NOTES
ANTICOAGULATION FOLLOW-UP CLINIC VISIT    Patient Name:  Mary Irizarry  Date:  4/5/2019  Contact Type:  Face to Face    SUBJECTIVE:     Patient Findings     Positives:   Change in activity (decreased activity)    Comments:   Wife states patient activity has decreased a fair amount. Patient states his right heel and right knee are painful when walking. We discussed that if it continues he should see his provider as there may be something he can do to alleviate some of the pain.            OBJECTIVE    INR Protime   Date Value Ref Range Status   04/05/2019 3.4 (A) 0.86 - 1.14 Final       ASSESSMENT / PLAN  INR assessment SUPRA decreased activity r/t pain in right heel and knee   Recheck INR In: 4 WEEKS    INR Location Clinic      Anticoagulation Summary  As of 4/5/2019    INR goal:   2.0-3.0   TTR:   61.1 % (1.2 y)   INR used for dosing:   3.4! (4/5/2019)   Warfarin maintenance plan:   2.5 mg (2.5 mg x 1) every day   Full warfarin instructions:   4/5: Hold; Otherwise 2.5 mg every day   Weekly warfarin total:   17.5 mg   Plan last modified:   Marylou Joshi RN (8/23/2018)   Next INR check:   5/3/2019   Target end date:       Indications    History of pulmonary embolism-bilateral on a CT scan from 1/10/18 [Z86.711]             Anticoagulation Episode Summary     INR check location:       Preferred lab:       Send INR reminders to:   [facility] ANTICOAG POOL    Comments:         Anticoagulation Care Providers     Provider Role Specialty Phone number    JABIER Kent MD [facility] [phone]            See the Encounter Report to view Anticoagulation Flowsheet and Dosing Calendar (Go to Encounters tab in chart review, and find the Anticoagulation Therapy Visit)        Marylou Joshi RN

## 2019-05-03 NOTE — PROGRESS NOTES
ANTICOAGULATION FOLLOW-UP CLINIC VISIT    Patient Name:  Mary Irizarry  Date:  5/3/2019  Contact Type:  Face to Face    SUBJECTIVE:     Patient Findings     Comments:   INR completed by Nurse.  Went over INR result, warfarin dosing and next INR recheck date.  Patient and wife verbalize understanding.  Patient has no abnormal bruising/bleeding or changes to diet/meds/activity           OBJECTIVE    INR Protime   Date Value Ref Range Status   2019 2.4 (A) 0.86 - 1.14 Final       ASSESSMENT / PLAN  INR assessment THER    Recheck INR In: 4 WEEKS    INR Location Clinic      Anticoagulation Summary  As of 5/3/2019    INR goal:   2.0-3.0   TTR:   61.0 % (1.3 y)   INR used for dosin.4 (5/3/2019)   Warfarin maintenance plan:   2.5 mg (2.5 mg x 1) every day   Full warfarin instructions:   2.5 mg every day   Weekly warfarin total:   17.5 mg   No change documented:   Mervat Fine RN   Plan last modified:   Marylou Joshi RN (2018)   Next INR check:   2019   Target end date:       Indications    History of pulmonary embolism-bilateral on a CT scan from 1/10/18 [Z86.711]             Anticoagulation Episode Summary     INR check location:       Preferred lab:       Send INR reminders to:   [facility] ANTICOAG POOL    Comments:         Anticoagulation Care Providers     Provider Role Specialty Phone number    JABIER Kent MD [facility] [phone]            See the Encounter Report to view Anticoagulation Flowsheet and Dosing Calendar (Go to Encounters tab in chart review, and find the Anticoagulation Therapy Visit)        Mervat Fine RN

## 2019-05-29 PROBLEM — N18.30 CKD (CHRONIC KIDNEY DISEASE) STAGE 3, GFR 30-59 ML/MIN (H): Status: ACTIVE | Noted: 2019-01-01

## 2019-05-29 PROBLEM — I50.32 CHRONIC DIASTOLIC HEART FAILURE (H): Status: ACTIVE | Noted: 2018-01-01

## 2019-05-29 PROBLEM — R53.81 PHYSICAL DECONDITIONING: Status: ACTIVE | Noted: 2019-01-01

## 2019-05-29 PROBLEM — R29.898 WEAKNESS OF BOTH LOWER EXTREMITIES: Status: ACTIVE | Noted: 2019-01-01

## 2019-05-29 PROBLEM — R79.89 ELEVATED TROPONIN I LEVEL: Status: RESOLVED | Noted: 2018-01-11 | Resolved: 2019-01-01

## 2019-05-29 NOTE — PROGRESS NOTES
ANTICOAGULATION FOLLOW-UP CLINIC VISIT    Patient Name:  Mary Irizarry  Date:  2019  Contact Type:  Saw patient face to face and also called his home and spoke to his wife re: warfarin dosing/INR recheck date.     SUBJECTIVE:  Patient Findings     Positives:   Change in activity, Change in diet/appetite (very little vit K intake)    Comments:   INR POC done. INR 7.6 so patient sent to lab for reflex INR. He has no bleeding/bruising and no med changes. Per his wife, his activity is way down and his vit K intake is also minimal. Patient sent to lab to verify INR result. Call placed to patient wife after return of lab result. We discussed INR result, warfarin dosing and INR recheck date. She verbalized understanding and has no questions.         Clinical Outcomes     Negatives:   Major bleeding event, Thromboembolic event, Anticoagulation-related hospital admission, Anticoagulation-related ED visit, Anticoagulation-related fatality    Comments:   INR POC done. INR 7.6 so patient sent to lab for reflex INR. He has no bleeding/bruising and no med changes. Per his wife, his activity is way down and his vit K intake is also minimal. Patient sent to lab to verify INR result. Call placed to patient wife after return of lab result. We discussed INR result, warfarin dosing and INR recheck date. She verbalized understanding and has no questions.            OBJECTIVE    INR   Date Value Ref Range Status   2019 7.01 (HH) 0.80 - 1.20 Final     Comment:     Critical Value called to and read back by  YASMINE AT 1435 ON 2019 BY JUSTICE         ASSESSMENT / PLAN  INR assessment SUPRA significant decrease in vit K intake and significant decrease in activity.   Recheck INR In: 5 DAYS    INR Location Clinic      Anticoagulation Summary  As of 2019    INR goal:   2.0-3.0   TTR:   58.4 % (1.3 y)   INR used for dosin.01! (2019)   Warfarin maintenance plan:   2.5 mg (2.5 mg x 1) every day   Full warfarin  instructions:   5/29: Hold; 5/30: Hold; 5/31: Hold; 6/1: 1.25 mg; Otherwise 2.5 mg every day   Weekly warfarin total:   17.5 mg   Plan last modified:   Marylou Joshi RN (5/29/2019)   Next INR check:   6/3/2019   Target end date:       Indications    History of pulmonary embolism-bilateral on a CT scan from 1/10/18 [Z86.711]             Anticoagulation Episode Summary     INR check location:       Preferred lab:       Send INR reminders to:   [facility] ANTICOAG POOL    Comments:         Anticoagulation Care Providers     Provider Role Specialty Phone number    JABIER Kent MD [facility] [phone]            See the Encounter Report to view Anticoagulation Flowsheet and Dosing Calendar (Go to Encounters tab in chart review, and find the Anticoagulation Therapy Visit)        Marylou Joshi RN

## 2019-05-29 NOTE — PATIENT INSTRUCTIONS
You were seen by Dr. Bryant, 5/29/2019.         Please call the cardiology office with problems, questions, or concerns at [phone].    If you experience chest pain, chest pressure, chest tightness, shortness of breath, fainting, lightheadedness, nausea, vomiting, or other concerning symptoms, please report to the Emergency Department or call 911. These symptoms may be emergent, and best treated in the Emergency Department.     Aysha LAUREANO RN  Cardiology   [facility]  [phone]

## 2019-05-29 NOTE — TELEPHONE ENCOUNTER
Discussed with Dr. Bryant who did see patient today.  Discussed with [facility] RN Marylou as well.  Patient with no new symptoms, no active bleeding, and she had already instructed him on holding his dose and following him closely.  ER if any changes, bleeding, etc.

## 2019-05-29 NOTE — PROGRESS NOTES
Cardiology Progress Note     Assessment & Plan   Mary Irizarry is a 86 year old male who is being seen in follow-up to visit from 11/26/18.  He was seen in the ER 1/14/19 for acute renal failure and generalized weakness.  He has been followed secondary to diastolic dysfunction grade 1 on 1/10/18, paroxysmal atrial fibrillation on Coumadin and metoprolol, mild lower extremity edema, and mild left atrial enlargement.  He was identified as having new onset atrial fibrillation on 10/22/18. His echo from 1/18/18 showed diastolic dysfunction grade 1 and has since been on Lasix 40 mg which was an increase from 20 mg daily on 10/22/18. His swelling had improved significantly and his shortness of breath essentially resolved.  He was seen in the ER on 1/14/19 with generalized weakness, worse to his left upper shoulder secondary to arthritis and pain.  He has been doing physical therapy.  He was found to have a sizable jump in his kidney function from 1.10 on 10/30/18 to 2.28 on 1/14/19.  He has been on Lasix 40 mg daily and lisinopril 10 mg daily.  At his ER visit, Lasix was decreased to 20 mg alternating with 40 mg daily.  Also, lisinopril was cut down to 5 mg daily.  He has not had a recheck of labs at this point.  He remains on Coumadin for atrial fibrillation and concerns for a moderate clot burden to bilateral lobes of his lung on a CTA from 1/10/18.  [facility]     Impression:  1.  Shortness of breath likely secondary to diastolic dysfunction grade 1 on an echo from 1/11/18.  2.  Fatigue potentially secondary to atrial fibrillation, DHARMESH, and/or diastolic dysfunction.  3.  Persistent atrial fibrillation diagnosed on 10/20/18.  4.  Hypertension-controlled.  5.  History of bilateral PEs on a CT scan from 1/10/18 currently on Coumadin.  6.  History of tobacco abuse at age 18 for approximately 2 months.  7.  Lower extremity edema secondary to diastolic dysfunction.  8.  On  Coumadin for atrial fibrillation.  9.  Mild left atrial enlargement  10.  Chronic diastolic dysfunction grade 1 on 1/11/18.  11.  CKD stage III.  12.  Sedentary lifestyle.  13.  Dyspnea on exertion likely secondary to diastolic dysfunction.  14.  Generalized muscle weakness.  15.  Sedentary lifestyle.  16.  History of bilateral PE on CT scan 1/10/2018.  17.  Concern with claudication.  18.  Physical deconditioning.      Plan:   1.  With evidence for lower extremity edema and history of diastolic dysfunction, he will continue on Lasix 40 mg daily.  However, this may need to be increased in the future as his weight on 1/21/2019 was 152 pounds and on 5/29/2019 was 162 pounds.  On 10/9/2018, his weight was 176 pounds.  His weight seems to oscillate but he does have increased swelling to his lower extremities.  He has not been checking his weight at home.  2.  Related to being on Lasix and diastolic dysfunction with a history of kidney disease, he will have labs today.  3.  He describes significant lower extremity weakness.  He will be set up with physical therapy.  4.  He is describing a heaviness to his legs with ambulation.  This simply could be deconditioning versus arterial disease.  He will be set up with [facility].  5.  He is to maintain a salt restriction of 2500 mg fluid restriction of 2 L, and weigh himself on a daily basis.  6.  He will be seen in 6 months follow-up or sooner if problems.    Preston Bryant      Physical Exam                      There were no vitals filed for this visit.  Vital Signs with Ranges     ROS is negative except that which was noted in the HPI.          Constitutional: awake, alert, cooperative, no apparent distress, and appears stated age  Eyes: Lids and lashes normal, pupils equal, sclera clear, conjunctiva normal  ENT: Normocephalic, without obvious abnormality, atraumatic.  Respiratory: No increased work of breathing, good air exchange, clear to auscultation bilaterally, no crackles  or wheezing  Cardiovascular: Normal apical impulse, regular rate and rhythm, normal S1 and S2, no S3 or S4, and no murmur noted  Musculoskeletal: no lower extremity pitting edema present  Neurologic: Awake, alert, oriented to name, place and time.  .  Neuropsychiatric: General: normal, calm and normal eye contact    Medications         Data   No results found for this or any previous visit (from the past 24 hour(s)).  No results found for this or any previous visit (from the past 24 hour(s)).

## 2019-05-29 NOTE — TELEPHONE ENCOUNTER
DATE:  5/29/2019   TIME OF RECEIPT FROM LAB:  2:13pm  LAB TEST:  INR  LAB VALUE:  7.01  RESULTS GIVEN WITH READ-BACK TO (PROVIDER):  Dr. Geiger   TIME LAB VALUE REPORTED TO PROVIDER:   2:35pm    Molly Storm LPN

## 2019-05-29 NOTE — TELEPHONE ENCOUNTER
Patient is requesting that his office notes from his appointment today 5/29/2019 with Dr. Bryant and any results be sent to the [facility] for continuation of care.

## 2019-05-29 NOTE — NURSING NOTE
"Chief Complaint   Patient presents with     RECHECK     4 month cardiology follow-up;  Patient requesting today's office note and [facility] [place]       Initial /75 (BP Location: Left arm, Patient Position: Chair, Cuff Size: Adult Regular)   Pulse 90   Temp 97.2  F (36.2  C) (Tympanic)   Resp 16   Ht 1.702 m (5' 7\")   Wt 73.5 kg (162 lb)   SpO2 98%   BMI 25.37 kg/m   Estimated body mass index is 25.37 kg/m  as calculated from the following:    Height as of this encounter: 1.702 m (5' 7\").    Weight as of this encounter: 73.5 kg (162 lb).  Medication Reconciliation: complete    Meredith Rodriguez LPN    "

## 2019-05-29 NOTE — TELEPHONE ENCOUNTER
Patient and Queta present in clinic today for appointment with Dr. Bryant.  Patient is questioning if Medicare will cover a wheelchair due to weakness in legs while walking distances.  Patient also goes to the [facility] a year.  Patient is requesting a call back to Queta Pimentel with information.

## 2019-05-31 NOTE — TELEPHONE ENCOUNTER
Faxed Dr Bryant note 5/29/19, labs and the [facility] Doppler No Exercise Order to the [facility].  Fax [phone]

## 2019-06-03 NOTE — PROGRESS NOTES
ANTICOAGULATION FOLLOW-UP CLINIC VISIT    Patient Name:  Mary Irizarry  Date:  6/3/2019  Contact Type:  Face to Face    SUBJECTIVE:  Patient Findings     Positives:   Change in diet/appetite (increase in vit K intake)    Comments:   Is going to be starting physical therapy today. No bleeding/bruising. Wife states vit K intake has been increased.         Clinical Outcomes     Negatives:   Major bleeding event, Thromboembolic event, Anticoagulation-related hospital admission, Anticoagulation-related ED visit, Anticoagulation-related fatality    Comments:   Is going to be starting physical therapy today. No bleeding/bruising. Wife states vit K intake has been increased.            OBJECTIVE    INR Protime   Date Value Ref Range Status   06/03/2019 3.4 (A) 0.86 - 1.14 Final       ASSESSMENT / PLAN  INR assessment SUPRA    Recheck INR In: 2 WEEKS    INR Location Clinic      Anticoagulation Summary  As of 6/3/2019    INR goal:   2.0-3.0   TTR:   57.8 % (1.3 y)   INR used for dosing:   3.4! (6/3/2019)   Warfarin maintenance plan:   1.25 mg (2.5 mg x 0.5) every Wed; 2.5 mg (2.5 mg x 1) all other days   Full warfarin instructions:   6/3: Hold; Otherwise 1.25 mg every Wed; 2.5 mg all other days   Weekly warfarin total:   16.25 mg   Plan last modified:   Marylou Joshi RN (6/3/2019)   Next INR check:   6/17/2019   Target end date:       Indications    History of pulmonary embolism-bilateral on a CT scan from 1/10/18 [Z86.711]             Anticoagulation Episode Summary     INR check location:       Preferred lab:       Send INR reminders to:   [facility] ANTICOAG POOL    Comments:         Anticoagulation Care Providers     Provider Role Specialty Phone number    JABIER Kent MD [facility] [phone]            See the Encounter Report to view Anticoagulation Flowsheet and Dosing Calendar (Go to Encounters tab in chart review, and find the Anticoagulation Therapy Visit)        Marylou Joshi RN

## 2019-06-06 NOTE — TELEPHONE ENCOUNTER
You have referred patient for PT.  Pt is requesting 4 wheeled walker with a seat for the patient.   Orders pending.   Anisa Carreon RN-BSN

## 2019-06-06 NOTE — TELEPHONE ENCOUNTER
Preston Bryant, DO  You 16 minutes ago (3:25 PM)      Rx for 4 wheeled walker has been signed.  The prescription printed off here.  Any idea where it should be faxed to?  Or should I just bring it with me next time I am in [place]?     DB    Routing comment

## 2019-06-07 NOTE — PROGRESS NOTES
06/03/19 1400   Quick Adds   Type of Visit Initial OP PT Evaluation   General Information   Start of Care Date 06/03/19   Referring Physician Dr. Bryant   Orders Evaluate and Treat as Indicated   Medical Diagnosis General weakness, sedentary lifestyle, weakness BLE, physical deconditioning   Precautions/Limitations fall precautions   Surgical/Medical history reviewed Yes   Pertinent history of current problem (include personal factors and/or comorbidities that impact the POC) Pt presents today with c/o LE weakness that has been getting worse for about 1 month. Pt has trouble walking longer distances. Pt feels weakness in his knees more so in his right knee. Pt has trouble with lifting. No falls. Pt's goal is to be able to easily walk to the barn at his home. He also wants to strengthen his legs. He and his wife are interested in getting him a 4 wheeled walker with a seat   Prior level of functional mobility Transfers;Ambulation   Transfers Independent   Ambulation Independent   Current Community Support Family/friend caregiver   Patient role/Employment history Retired   Living environment [facility]   Fall Risk Screen   Have you fallen 2 or more times in the past year? No   Have you fallen and had an injury in the past year? No   Is patient a fall risk? Yes   Pain   Patient currently in pain Yes   Pain location R knee   Pain rating 3   Pain description Dull   Cognitive Status Examination   Orientation orientation to person, place and time   Level of Consciousness alert   Follows Commands and Answers Questions 100% of the time   Personal Safety and Judgment intact   Memory intact   Posture   Posture Normal   Range of Motion (ROM)   ROM Comment BLE ROM is WFL   Strength   Manual Muscle Testing Quick Adds MMT: Hip;MMT: Knee;MMT: Ankle   MMT: Hip, Rehab Eval   Hip Flexion - Left Side (3+/5) fair plus, left   Hip Extension - Left Side (3+/5) fair plus, left   Hip ABduction - Left Side (3+/5) fair plus, left   Hip  ADduction - Left Side (3+/5) fair plus, left   Hip Flexion - Right Side (3+/5) fair plus, right   Hip Extension - Right Side (3+/5) fair plus, right   Hip ABduction - Right Side (3+/5) fair plus, right   Hip ADduction - Right Side (3+/5) fair plus, right   MMT: Knee, Rehab Eval   Knee Flexion - Left Side (4-/5) good minus, left   Knee Extension - Left Side (4-/5) good minus, left   Knee Flexion - Right Side (4-/5) good minus, right   Knee Extension - Right Side (4-/5) good minus, right   MMT: Ankle, Rehab Eval   Ankle Dorsiflexion - Left Side (4/5) good, left   Ankle Plantarflexion - Left Side (3+/5) fair plus, left   Ankle Dorsiflexion - Right Side (4/5) good, right   Ankle Plantarflexion - Right Side (3+/5) fair plus, right   Bed Mobility   Bed Mobility Bed mobility skill: Supine to sit   Bed Mobility Skill: Supine to Sit   Level of [place]: Supine/Sit moderate assist (50% patients effort)   Physical Assist/Nonphysical Assist: Supine/Sit 1 person assist   Gait   Gait Gait Skill;Gait Analysis   Gait Comments Tried 4WW. Pt demonsrated with improved speed and stability with 4WW, decreased gait impairment   Gait Skills   Level of [place]: Gait stand-by assist   Physical Assist/Nonphysical Assist: Gait 1 person assist   Weight-Bearing Restrictions: Gait weight-bearing as tolerated   Gait Analysis   Gait Pattern Used swing-through gait   Gait Deviations Noted decreased radha;increased time in double stance;decreased step length;decreased toe-to-floor clearance   Impairments Contributing to Gait Deviations impaired motor control;decreased strength  (Shuffling gait, flexed posture)   Balance   Balance Comments Will monitor balance and assess more as needed. Lack of time during eval to complete all balance testing   Balance Special Tests   Balance Special Tests Sit to stand reps   Balance Special Tests Sit to Stand Reps in 30 Seconds   Reps in 30 seconds 5   Height 24 inches   Coordination   Coordination no  deficits were identified   Muscle Tone   Muscle Tone no deficits were identified   Modality Interventions   Planned Modality Interventions Ultrasound;Cryotherapy;Thermotherapy: Hydrocollator Packs   Planned Modality Interventions Comments if needed for knee pain   Planned Therapy Interventions   Planned Therapy Interventions balance training;gait training;strengthening;stretching;neuromuscular re-education;transfer training   Clinical Impression   Criteria for Skilled Therapeutic Interventions Met yes, treatment indicated   PT Diagnosis LE weakness, impaired gait   Influenced by the following impairments LE weakness, impaired gait   Functional limitations due to impairments Decreased tolerance for functional activities    Clinical Presentation Stable/Uncomplicated   Clinical Presentation Rationale Clinical judgement   Clinical Decision Making (Complexity) Low complexity   Therapy Frequency 2 times/Week   Predicted Duration of Therapy Intervention (days/wks) up to 90 days   Risk & Benefits of therapy have been explained Yes   Patient, Family & other staff in agreement with plan of care Yes   Clinical Impression Comments General weakness, sedentary lifestyle, weakness BLE, physical deconditioning expected to improve with skilled PT services   GOALS   PT Eval Goals 1;2;3;4   Goal 1   Goal Identifier STG 1   Goal Description Pt will demonstrate knowledge/understanding of HEP and report daily compliance   Target Date 06/21/19   Goal 2   Goal Identifier LTG 1   Goal Description Pt will demonstrate improved LE strength and stability as demonstrated by increasing sit <> stand reps to 10 in 30 seconds   Target Date 09/01/19   Goal 3   Goal Identifier LTG 2   Goal Description Pt will meet his goal of being able to ambulate to his barn and back at home without needing to rest or being limited by fatigue/decreased activity tolerance   Target Date 09/01/19   Goal 4   Goal Identifier LTG 3   Goal Description Pt will demonstrate  safe ambulation modified independent using 4WW including using breaks as well as seat for use outside home and in community   Target Date 09/01/19   Total Evaluation Time   PT [place], Low Complexity Minutes (86644) 15     I certify the need for these services furnished under this plan of treatment and while under my care. (Physician co-signature of this document indicates review and certification of the therapy plan).

## 2019-06-07 NOTE — TELEPHONE ENCOUNTER
Fax received, faxed to [email or web address] .  PT department notified of approval for request.   Anisa Carreon RN-BSN

## 2019-06-07 NOTE — TELEPHONE ENCOUNTER
Outreach to patient to report lab results.  Patient's wife reports that patient has had increased shortness of breath since the follow up appointment with you.  States that he is negative for fever, chills, cough, chest pain.  States that he does have swelling in the extremities but the amount of swelling is normal for him.  Patient does not do daily weights and wife does not have a current weight on the patient.  Discussed fluid and sodium intake with patients wife with reminder of the recommendation of 2500 mg of sodium daily and less than 2 liters of fluid daily.  I have scheduled patient for follow up with primary care on 6/1719 with Dr. RUTH Kent.  Please advise any further recommendations.   Anisa Carreon RN-BSN

## 2019-06-17 NOTE — NURSING NOTE
"Chief Complaint   Patient presents with     Shortness of Breath       Initial /60 (Patient Position: Sitting)   Pulse 85   Wt 73.9 kg (163 lb)   SpO2 (!) 86%   BMI 25.53 kg/m   Estimated body mass index is 25.53 kg/m  as calculated from the following:    Height as of 5/29/19: 1.702 m (5' 7\").    Weight as of this encounter: 73.9 kg (163 lb).  Medication Reconciliation: complete      "

## 2019-06-17 NOTE — PROGRESS NOTES
ANTICOAGULATION FOLLOW-UP CLINIC VISIT    Patient Name:  Mary Irizarry  Date:  2019  Contact Type:  Face to Face    SUBJECTIVE:  Patient Findings     Comments:   Has increased intake of vit K as requested. No bleeding/bruising, Is going to therapy for strengthening.         Clinical Outcomes     Negatives:   Major bleeding event, Thromboembolic event, Anticoagulation-related hospital admission, Anticoagulation-related ED visit, Anticoagulation-related fatality    Comments:   Has increased intake of vit K as requested. No bleeding/bruising, Is going to therapy for strengthening.            OBJECTIVE    INR Protime   Date Value Ref Range Status   2019 2.7 (A) 0.86 - 1.14 Final       ASSESSMENT / PLAN  INR assessment THER    Recheck INR In: 4 WEEKS    INR Location Clinic      Anticoagulation Summary  As of 2019    INR goal:   2.0-3.0   TTR:   57.4 % (1.4 y)   INR used for dosin.7 (2019)   Warfarin maintenance plan:   1.25 mg (2.5 mg x 0.5) every Wed; 2.5 mg (2.5 mg x 1) all other days   Full warfarin instructions:   1.25 mg every Wed; 2.5 mg all other days   Weekly warfarin total:   16.25 mg   No change documented:   Marylou Joshi RN   Plan last modified:   Marylou Joshi RN (6/3/2019)   Next INR check:   7/15/2019   Target end date:       Indications    History of pulmonary embolism-bilateral on a CT scan from 1/10/18 [Z86.711]             Anticoagulation Episode Summary     INR check location:       Preferred lab:       Send INR reminders to:   [facility] ANTICOAG POOL    Comments:         Anticoagulation Care Providers     Provider Role Specialty Phone number    JABIER Kent MD [facility] [phone]            See the Encounter Report to view Anticoagulation Flowsheet and Dosing Calendar (Go to Encounters tab in chart review, and find the Anticoagulation Therapy Visit)        Marylou Joshi RN

## 2019-06-17 NOTE — PROGRESS NOTES
Subjective     Mary Irizarry is a 86 year old male who presents to clinic today for the following health issues:    HPI   SOB       Duration: 1 year     Description (location/character/radiation): SOB     Intensity:  mild    Accompanying signs and symptoms: wife believes pt sounds as if he is having a hard time trying to breathe, pt states he thinks his breathing has improved    History (similar episodes/previous evaluation): yes    Precipitating or alleviating factors: None    Therapies tried and outcome: None    They did see Dr. Bryant has diastolic failure.  He is gained about 10 pounds since January.  He denies any orthopnea.  Denies any angina symptoms.  She also is wondering if he might have Parkinson's.  His handwriting is been smaller.  Has a tremor.         Patient Active Problem List   Diagnosis     Facial lesion     Left inguinal hernia     ACP (advance care planning)     Lesion of left pinna     Essential hypertension, benign     Hypoxia     Acute respiratory failure with hypoxia (H)     Diastolic dysfunction grade 1 on 1/10/18     Sedentary lifestyle     New onset a-fib diagnosed 10/22/18     History of pulmonary embolism-bilateral on a CT scan from 1/10/18     LAE (left atrial enlargement)-mild     On Coumadin for atrial fibrillation     Chronic diastolic heart failure grade 1 on 18     Other fatigue     Dyspnea on exertion     Paroxysmal atrial fibrillation (H)     Lower extremity edema     Generalized muscle weakness     CKD (chronic kidney disease) stage 3, GFR 30-59 ml/min (H)     Weakness of both lower extremities     Physical deconditioning     Past Surgical History:   Procedure Laterality Date     GENITOURINARY SURGERY      prostatectomy       Social History     Tobacco Use     Smoking status: Former Smoker     Types: Cigarettes     Start date: 1949     Last attempt to quit: 3/1/1949     Years since quittin.3     Smokeless tobacco: Never Used   Substance Use Topics     Alcohol use:  Yes     Alcohol/week: 0.0 oz     Comment: occa     Family History   Problem Relation Age of Onset     Hypertension Mother      Unknown/Adopted Father      No Known Problems Maternal Grandmother      No Known Problems Maternal Grandfather      No Known Problems Paternal Grandmother      No Known Problems Paternal Grandfather      No Known Problems Sister      No Known Problems Son      No Known Problems Son      Deep Vein Thrombosis (DVT) Son          Current Outpatient Medications   Medication Sig Dispense Refill     Cholecalciferol (VITAMIN D-3 PO) Take 50 mcg by mouth daily       Cyanocobalamin (B-12) 1000 MCG CAPS Take 1,000 mcg by mouth daily 90 capsule 3     doxazosin (CARDURA) 4 MG tablet Take 1 tablet (4 mg) by mouth daily 90 tablet 3     furosemide (LASIX) 40 MG tablet Take 1 tablet (40 mg) by mouth daily 30 tablet 11     lisinopril (PRINIVIL/ZESTRIL) 5 MG tablet Take 1 tablet (5 mg) by mouth daily 90 tablet 3     metoprolol succinate (TOPROL-XL) 25 MG 24 hr tablet Take 1 tablet (25 mg) by mouth daily 93 tablet 3     order for DME Equipment being ordered: Walker 4 wheeled with Seat 1 Units 0     order for DME Equipment being ordered: Wheelchair  Fax to [employer] 1 Device 0     Pyridoxine HCl (VITAMIN B6 PO) Take 100 mg by mouth daily       warfarin (COUMADIN) 2.5 MG tablet 2.5mg (1 pill) daily or as directed by warfarin clinic 110 tablet 3     No Known Allergies  BP Readings from Last 3 Encounters:   06/17/19 102/60   05/29/19 113/75   01/21/19 109/65    Wt Readings from Last 3 Encounters:   06/17/19 73.9 kg (163 lb)   05/29/19 73.5 kg (162 lb)   01/21/19 68.9 kg (152 lb)                      Reviewed and updated as needed this visit by Provider         Review of Systems   ROS COMP: Constitutional, HEENT, cardiovascular, pulmonary, gi and gu systems are negative, except as otherwise noted.      Objective    /60 (Patient Position: Sitting)   Pulse 85   Wt 73.9 kg (163 lb)   SpO2 92%   BMI 25.53  "kg/m    Body mass index is 25.53 kg/m .  Physical Exam   GENERAL: healthy, alert and no distress  EYES: Eyes grossly normal to inspection, PERRL and conjunctivae and sclerae normal  NECK: no adenopathy, no asymmetry, masses, or scars and thyroid normal to palpation  RESP: lungs clear to auscultation - no rales, rhonchi or wheezes  CV: regular rate and rhythm, normal S1 S2, no S3 or S4, no murmur, click or rub, no peripheral edema and peripheral pulses strong  ABDOMEN: soft, nontender, no hepatosplenomegaly, no masses and bowel sounds normal  NEURO: Has a flattened affect.  When he tries to turn around it takes him a little longer.  On his right upper extremity there feels like there is a little bit of cogwheeling.  Does have occasional tremor in that arm    Diagnostic Test Results:  pending        Assessment & Plan       ICD-10-CM    1. Chronic diastolic congestive heart failure (H)  I50.32 Basic metabolic panel     N terminal pro BNP outpatient   2. Tremor R25.1    Show the heart failure we will check a BMP and a BNP.  He is on 40 of Lasix daily for the next 3 days leading up to 80 mg a day of Lasix.  Advised that they try to cut back on salt.  Watch for bread intake or soups or processed meats.  We will see him in a month to recheck blood pressure and weight.  Does have a tremor offered we could have him see a neurologist to evaluate to see if this is  Parkinson's.  At this point he wants to hold on that.  Will readdress that at next visit.  If they change their minds and would like to see the neurologist we can set up that appointment.     BMI:   Estimated body mass index is 25.53 kg/m  as calculated from the following:    Height as of 5/29/19: 1.702 m (5' 7\").    Weight as of this encounter: 73.9 kg (163 lb).               Return in about 1 month (around 7/15/2019) for follow up problem.    JABIER Kent MD  [facility] [place]      "

## 2019-06-27 NOTE — TELEPHONE ENCOUNTER
Preston Bryant, DO  You 2 days ago      It looks like back in October we increased his Lasix from 20 to 40 mg as his weight was 168 pounds.  His weight improved to 150 pounds with improved symptoms.  His weight is now back up to 163.  I suspect he is retaining fluid.  Have him stop Lasix 40 mg daily.  I called in a prescription of torsemide 20 mg daily to [place].  Have him start taking the torsemide 20 mg daily.  He must have labs in 1 week.  Order placed.  Also, we will have him watch his salt intake, fluid intake, and weigh himself on a daily basis.  Have him let us know if he is not feeling better in the next few weeks, now on torsemide.  Thanks!     Dr. Bryant    Routing comment

## 2019-06-27 NOTE — TELEPHONE ENCOUNTER
Notified patient's wife.  Verbalized understanding.    Patient will return for Lab on July 5th, 2019.   Anisa Carreon RN-BSN

## 2019-07-08 NOTE — TELEPHONE ENCOUNTER
1:28 PM    Reason for Call: OVERBOOK    Patient is having the following symptoms: Severe knee pain  for  Ongoing , getting worse    The patient is requesting an appointment for ASAP  with Dr. Uriel Kent    Was an appointment offered for this call?  No    Preferred method for responding to this message: [phone]    If we cannot reach you directly, may we leave a detailed response at the number you provided? Yes        Marylou Gurrola

## 2019-07-08 NOTE — PROGRESS NOTES
Subjective     Mary Irizarry is a 86 year old male who presents to clinic today for the following health issues:    HPI   Breathing Problem      Duration: Follow up    Description (location/character/radiation): SOB    Intensity:  mild    Accompanying signs and symptoms: Breathing difficulties    History (similar episodes/previous evaluation): None    Precipitating or alleviating factors: None    Therapies tried and outcome: PT     His wife states over the last several days his memory has gotten worse.  No history of trauma he denies any headache or acute vision change.  Feels like overall he is just weaker.  No fevers no vomiting no diarrhea no chest pain or acute shortness of breath.  No orthopnea.      PAST MEDICAL HISTORY:  Past Medical History:   Diagnosis Date     Elevated troponin I level 1/11/2018     Other fatigue 10/22/2018       PAST SURGICAL HISTORY:  Past Surgical History:   Procedure Laterality Date     GENITOURINARY SURGERY      prostatectomy       MEDICATIONS:  Prior to Admission medications    Medication Sig Start Date End Date Taking? Authorizing Provider   Cholecalciferol (VITAMIN D-3 PO) Take 50 mcg by mouth daily   Yes Reported, Patient   Cyanocobalamin (B-12) 1000 MCG CAPS Take 1,000 mcg by mouth daily 4/17/18  Yes Don Geiger MD   doxazosin (CARDURA) 4 MG tablet Take 1 tablet (4 mg) by mouth daily 1/21/19  Yes Preston Bryant, DO   lisinopril (PRINIVIL/ZESTRIL) 5 MG tablet Take 1 tablet (5 mg) by mouth daily 1/21/19  Yes Preston Bryant, DO   metoprolol succinate (TOPROL-XL) 25 MG 24 hr tablet Take 1 tablet (25 mg) by mouth daily 11/26/18  Yes Preston Bryant, DO   order for DME Equipment being ordered: Walker 4 wheeled with Seat 6/6/19  Yes Preston Bryant, DO   order for DME Equipment being ordered: Wheelchair  Fax to [facility] 5/30/19  Yes JABIER Kent MD   Pyridoxine HCl (VITAMIN B6 PO) Take 100 mg by mouth daily   Yes Reported, Patient   torsemide (DEMADEX) 10 MG  "tablet Take 1 tablet (10 mg) by mouth daily 7/9/19  Yes Preston Bryant, DO   warfarin (COUMADIN) 2.5 MG tablet 2.5mg (1 pill) daily or as directed by warfarin clinic 4/5/19  Yes JABIER Kent MD       ALLERGIES:   No Known Allergies    ROS:  Constitutional, neuro, ENT, endocrine, pulmonary, cardiac, gastrointestinal, genitourinary, musculoskeletal, integument and psychiatric systems are negative, except as otherwise noted.      EXAM:  /56 (BP Location: Right arm, Patient Position: Sitting, Cuff Size: Adult Regular)   Pulse 91   Temp 96.5  F (35.8  C) (Tympanic)   Ht 1.702 m (5' 7\")   Wt 71.7 kg (158 lb)   SpO2 98%   BMI 24.75 kg/m   Body mass index is 24.75 kg/m .   GENERAL APPEARANCE: healthy, alert and no distress  EYES: Eyes grossly normal to inspection, PERRL and conjunctivae and sclerae normal  HENT: nose and mouth without ulcers or lesions  NECK: no adenopathy, no asymmetry, masses, or scars and thyroid normal to palpation  RESP: lungs clear to auscultation - no rales, rhonchi or wheezes  CV: regular rates and rhythm, normal S1 S2, no S3 or S4, no murmur, click or rub and does have bilateral 1+ pedal edema  ABDOMEN: soft, nontender, without hepatosplenomegaly or masses and bowel sounds normal  NEURO: He is alert wife that he is forgetting more things at home.  Proximal leg strength seems to be a little diminished.    PSYCH: No delusional thoughts  Lab/ X-ray  No results found for this or any previous visit (from the past 24 hour(s)).    ASSESSMENT/PLAN:    ICD-10-CM    1. Chronic diastolic congestive heart failure (H)  I50.32 HOME CARE NURSING REFERRAL   2. Memory loss R41.3 TSH with free T4 reflex     Vitamin B12     Comprehensive metabolic panel     CBC with platelets differential     MR Brain w/o Contrast     Because of his progressive weakness with his heart failure we will have home care nursing eval.  He is getting physical therapy and wife thinks maybe is helping.  Because of the " progression of his memory loss will get a brain MRI CBC CMP vitamin B12 and TSH and call him with results.  Offered neurology referral at this point wife wants to wait and see what the MRI shows.  They are going to check an to installing a motorized chair lift to get him up to the second floor where the shower is in their home.    HARRIETT Kent MD  July 22, 2019

## 2019-07-09 NOTE — TELEPHONE ENCOUNTER
[facility] and will be faxed once signature is obtained per Dr. Bryant.  Anisa Carreon RN-BSN

## 2019-07-12 NOTE — PROGRESS NOTES
Subjective     Mary Irizarry is a 86 year old male who presents to clinic today for the following health issues:    HPI   Musculoskeletal problem/pain      Duration: 1 month    Description  Location: R knee    Intensity:  moderate    Accompanying signs and symptoms: none    History  Previous similar problem: no   Previous evaluation:  none    Precipitating or alleviating factors:  Trauma or overuse: YES  Aggravating factors include: standing and walking    Therapies tried and outcome: nothing      Patient Active Problem List   Diagnosis     Facial lesion     Left inguinal hernia     ACP (advance care planning)     Lesion of left pinna     Essential hypertension, benign     Hypoxia     Acute respiratory failure with hypoxia (H)     Diastolic dysfunction grade 1 on 1/10/18     Sedentary lifestyle     New onset a-fib diagnosed 10/22/18     History of pulmonary embolism-bilateral on a CT scan from 1/10/18     LAE (left atrial enlargement)-mild     On Coumadin for atrial fibrillation     Chronic diastolic heart failure grade 1 on 18     Other fatigue     Dyspnea on exertion     Paroxysmal atrial fibrillation (H)     Lower extremity edema     Generalized muscle weakness     CKD (chronic kidney disease) stage 3, GFR 30-59 ml/min (H)     Weakness of both lower extremities     Physical deconditioning     Past Surgical History:   Procedure Laterality Date     GENITOURINARY SURGERY      prostatectomy       Social History     Tobacco Use     Smoking status: Former Smoker     Types: Cigarettes     Start date: 1949     Last attempt to quit: 3/1/1949     Years since quittin.4     Smokeless tobacco: Never Used   Substance Use Topics     Alcohol use: Yes     Alcohol/week: 0.0 oz     Comment: occa     Family History   Problem Relation Age of Onset     Hypertension Mother      Unknown/Adopted Father      No Known Problems Maternal Grandmother      No Known Problems Maternal Grandfather      No Known Problems Paternal  "Grandmother      No Known Problems Paternal Grandfather      No Known Problems Sister      No Known Problems Son      No Known Problems Son      Deep Vein Thrombosis (DVT) Son          Current Outpatient Medications   Medication Sig Dispense Refill     Cholecalciferol (VITAMIN D-3 PO) Take 50 mcg by mouth daily       Cyanocobalamin (B-12) 1000 MCG CAPS Take 1,000 mcg by mouth daily 90 capsule 3     doxazosin (CARDURA) 4 MG tablet Take 1 tablet (4 mg) by mouth daily 90 tablet 3     lisinopril (PRINIVIL/ZESTRIL) 5 MG tablet Take 1 tablet (5 mg) by mouth daily 90 tablet 3     metoprolol succinate (TOPROL-XL) 25 MG 24 hr tablet Take 1 tablet (25 mg) by mouth daily 93 tablet 3     order for DME Equipment being ordered: Walker 4 wheeled with Seat 1 Units 0     order for DME Equipment being ordered: Wheelchair  Fax to [facility] 1 Device 0     Pyridoxine HCl (VITAMIN B6 PO) Take 100 mg by mouth daily       torsemide (DEMADEX) 10 MG tablet Take 1 tablet (10 mg) by mouth daily 90 tablet 3     warfarin (COUMADIN) 2.5 MG tablet 2.5mg (1 pill) daily or as directed by warfarin clinic 110 tablet 3     No Known Allergies  BP Readings from Last 3 Encounters:   07/12/19 100/54   06/17/19 102/60   05/29/19 113/75    Wt Readings from Last 3 Encounters:   07/12/19 71.7 kg (158 lb)   06/17/19 73.9 kg (163 lb)   05/29/19 73.5 kg (162 lb)        Reviewed and updated as needed this visit by Provider         Review of Systems   ROS COMP: Constitutional, HEENT, cardiovascular, pulmonary, gi and gu systems are negative, except as otherwise noted.      Objective    /54 (BP Location: Right arm, Patient Position: Sitting, Cuff Size: Adult Regular)   Pulse 66   Temp 97.4  F (36.3  C) (Tympanic)   Ht 1.702 m (5' 7\")   Wt 71.7 kg (158 lb)   SpO2 96%   BMI 24.75 kg/m    There is no height or weight on file to calculate BMI.  Physical Exam   GENERAL: healthy, alert and no distress  NECK: no adenopathy, no asymmetry, masses, or scars and " thyroid normal to palpation  MS: obvious arthritis bilaterally  PSYCH: mentation appears normal, affect normal/bright    Diagnostic Test Results:  Labs reviewed in Epic  Results for orders placed or performed in visit on 07/05/19   Basic metabolic panel   Result Value Ref Range    Sodium 140 133 - 144 mmol/L    Potassium 3.8 3.4 - 5.3 mmol/L    Chloride 105 94 - 109 mmol/L    Carbon Dioxide 29 20 - 32 mmol/L    Anion Gap 6 3 - 14 mmol/L    Glucose 123 (H) 70 - 99 mg/dL    Urea Nitrogen 40 (H) 7 - 30 mg/dL    Creatinine 1.50 (H) 0.66 - 1.25 mg/dL    GFR Estimate 41 (L) >60 mL/min/[1.73_m2]    GFR Estimate If Black 48 (L) >60 mL/min/[1.73_m2]    Calcium 8.4 (L) 8.5 - 10.1 mg/dL   N terminal pro BNP outpatient   Result Value Ref Range    N-Terminal Pro Bnp 4,843 (H) 0 - 450 pg/mL           Assessment & Plan     (M17.0) Primary osteoarthritis of both knees  (primary encounter diagnosis)  Comment:   Plan: XR Knee Standing Bilateral 3 Views (Clinic         Performed), triamcinolone (KENALOG-40)         injection 40 mg, Large Joint/Bursa injection         and/or drainage (Shoulder, Knee), triamcinolone        (KENALOG-40) injection 40 mg, lidocaine 1 %         injection 6 mL, CANCELED: XR KNEE STANDING         RIGHT 2 VIEWS (Clinic Performed), CANCELED: XR         KNEE STANDING LEFT 2 VIEWS (Clinic Performed)        Discussed other options:  Icyhot, biofreeze, tylenol (no NSAIDs as he is on warfarin)  He wanted to try doing the right knee today, will come back if he wants the left done       Patient was agreeable to this plan and had no further questions.  There are no Patient Instructions on file for this visit.    No follow-ups on file.    Diana Cheung MD  [facility]

## 2019-07-12 NOTE — NURSING NOTE
"Chief Complaint   Patient presents with     Musculoskeletal Problem       Initial /54 (BP Location: Right arm, Patient Position: Sitting, Cuff Size: Adult Regular)   Pulse 66   Temp 97.4  F (36.3  C) (Tympanic)   Ht 1.702 m (5' 7\")   Wt 71.7 kg (158 lb)   SpO2 96%   BMI 24.75 kg/m   Estimated body mass index is 24.75 kg/m  as calculated from the following:    Height as of this encounter: 1.702 m (5' 7\").    Weight as of this encounter: 71.7 kg (158 lb).  Medication Reconciliation: complete  "

## 2019-07-12 NOTE — PROGRESS NOTES
PROCEDURE:  JOINT ASPIRATION/INJECTION.         After a discussion of risks, benefits and side effects of procedure, informed patient consent was obtained.       The right knee was prepped and draped in the usual clean fashion (sterile not required for this procedure).      INJECTION:  Using 6 cc of 1% lidocaine mixed                           with 40 mg of kenalog, the right knee was successfully injected                           without complication.  Patient did experience some pain                          relief following injection.

## 2019-07-15 NOTE — Clinical Note
Your patient, Mary Irizarry had an INR result of 7.4 in coumadin clinic so sent him to have lab INR.  According to his wife he is not moving around much at all when home r/t painful knees.  I am holding warfarin for 3 days and decreasing dose on day 4 and will have him come back Friday for INR recheck. He has no bleeding/bruising and no other changes.Marylou Joshi RNAnticoagulation clinic

## 2019-07-19 NOTE — PROGRESS NOTES
ANTICOAGULATION FOLLOW-UP CLINIC VISIT    Patient Name:  Mary Irizarry  Date:  2019  Contact Type:  Face to Face    SUBJECTIVE:  Patient Findings     Positives:   Change in activity    Comments:   INR completed by Nurse.  Went over INR result, warfarin dosing and next INR recheck date.  Patient verbalized understanding.  States no bleeding/bruising or changes to diet/meds or upcoming procedures.  He isn't very active and is working with PT.  Note left for PCP and SW in regards to possible complementary home eval visit.                Clinical Outcomes     Comments:   INR completed by Nurse.  Went over INR result, warfarin dosing and next INR recheck date.  Patient verbalized understanding.  States no bleeding/bruising or changes to diet/meds or upcoming procedures.  He isn't very active and is working with PT.  Note left for PCP and SW in regards to possible complementary home eval visit.                   OBJECTIVE    INR Protime   Date Value Ref Range Status   2019 1.7 (A) 0.86 - 1.14 Final       ASSESSMENT / PLAN  INR assessment SUB    Recheck INR In: 1 WEEK    INR Location Clinic      Anticoagulation Summary  As of 2019    INR goal:   2.0-3.0   TTR:   54.4 % (1.5 y)   INR used for dosin.7! (2019)   Warfarin maintenance plan:   1.25 mg (2.5 mg x 0.5) every Wed; 2.5 mg (2.5 mg x 1) all other days   Full warfarin instructions:   : 3.75 mg; Otherwise 1.25 mg every Wed; 2.5 mg all other days   Weekly warfarin total:   16.25 mg   Plan last modified:   Marylou Joshi RN (6/3/2019)   Next INR check:   2019   Target end date:       Indications    History of pulmonary embolism-bilateral on a CT scan from 1/10/18 [Z86.711]             Anticoagulation Episode Summary     INR check location:       Preferred lab:       Send INR reminders to:   [facility] ANTICOAG POOL    Comments:         Anticoagulation Care Providers     Provider Role Specialty Phone number    JABIER Kent MD Responsible  Family Practice [phone]            See the Encounter Report to view Anticoagulation Flowsheet and Dosing Calendar (Go to Encounters tab in chart review, and find the Anticoagulation Therapy Visit)      Mervat Fine RN

## 2019-07-22 NOTE — NURSING NOTE
"Chief Complaint   Patient presents with     Breathing Problem       Initial /56 (BP Location: Right arm, Patient Position: Sitting, Cuff Size: Adult Regular)   Pulse 91   Temp 96.5  F (35.8  C) (Tympanic)   Ht 1.702 m (5' 7\")   Wt 71.7 kg (158 lb)   SpO2 98%   BMI 24.75 kg/m   Estimated body mass index is 24.75 kg/m  as calculated from the following:    Height as of this encounter: 1.702 m (5' 7\").    Weight as of this encounter: 71.7 kg (158 lb).  Medication Reconciliation: complete  "

## 2019-07-25 NOTE — PROGRESS NOTES
ANTICOAGULATION FOLLOW-UP CLINIC VISIT    Patient Name:  Mary Irizarry  Date:  7/25/2019  Contact Type:  Telephone/ spoke to homecare nurseGenoveva    SUBJECTIVE:  Patient Findings     Comments:   Call and message from homecare  Nurse that she is unable to get INR today.  Call returned to homecare  NurseGenoveva and we discussed warfarin dosing and INR recheck will be done on Tuesday 7/30/19  Homecare nurse verbalized understanding and has no questions.         Clinical Outcomes     Negatives:   Major bleeding event, Thromboembolic event, Anticoagulation-related hospital admission, Anticoagulation-related ED visit, Anticoagulation-related fatality    Comments:   Call and message from homecare  Nurse that she is unable to get INR today.  Call returned to homecare  NurseGenoveva and we discussed warfarin dosing and INR recheck will be done on Tuesday 7/30/19  Homecare nurse verbalized understanding and has no questions.            OBJECTIVE    INR Protime   Date Value Ref Range Status   07/19/2019 1.7 (A) 0.86 - 1.14 Final       ASSESSMENT / PLAN  No question data found.  Anticoagulation Summary  As of 7/25/2019    INR goal:   2.0-3.0   TTR:   54.4 % (1.5 y)   INR used for dosing:   No new INR was available at the time of this encounter.   Warfarin maintenance plan:   1.25 mg (2.5 mg x 0.5) every Wed; 2.5 mg (2.5 mg x 1) all other days   Full warfarin instructions:   1.25 mg every Wed; 2.5 mg all other days   Weekly warfarin total:   16.25 mg   No change documented:   Marylou Joshi RN   Plan last modified:   Marylou Joshi RN (6/3/2019)   Next INR check:   7/30/2019   Target end date:       Indications    History of pulmonary embolism-bilateral on a CT scan from 1/10/18 [Z86.711]             Anticoagulation Episode Summary     INR check location:       Preferred lab:       Send INR reminders to:   [facility] ANTICOAG POOL    Comments:         Anticoagulation Care Providers     Provider Role Specialty Phone number     JABIER Kent MD [facility] [phone]            See the Encounter Report to view Anticoagulation Flowsheet and Dosing Calendar (Go to Encounters tab in chart review, and find the Anticoagulation Therapy Visit)        Marylou Joshi RN

## 2019-07-26 NOTE — TELEPHONE ENCOUNTER
nurse from homecare . Needs verbal orders for patient . For P.T. Home health aid, and home nursing. And OT.   Jo Ann Corbin

## 2019-07-26 NOTE — PROGRESS NOTES
Clinic Care Coordination Contact  Care Team Conversations    Was asked to reach out to pt regarding stair lift.  Contacted pt's wife with information regarding [employer], [employer], and [employer] lifts.  She advise [employer] and [employer] are coming next week to start evaluation process; they will then determine which lift will work better.  Will follow up with pt in about 1-2 months to ensure they are connected with needed equipment.    Krys Alvarado, [facility]  Outpatient   [phone]

## 2019-07-26 NOTE — TELEPHONE ENCOUNTER
Called patient, left message. Need to know where they would like referral for in home physical therapy. Waiting for call back.   Jo Ann Corbin

## 2019-07-30 NOTE — PROGRESS NOTES
ANTICOAGULATION FOLLOW-UP CLINIC VISIT    Patient Name:  Mary Irizarry  Date:  2019  Contact Type:  Face to Face and also spoke to homecare nurseGenoveva via phone and then left message on her cellphone re: INR result, warfarin dosing/INR recheck date.     SUBJECTIVE:  Patient Findings     Positives:   Change in activity (greatly decreased), Change in diet/appetite (not eating much)    Comments:   INR done. Per patient wife, patient activity has decreased greatly, he is not eating much. He is getting home PT and being followed by homecare services. Call placed to homecare nurseGenoveva, and briefly spoke to her and then she requested I call her cellphone and leave a voicemail for her re: patient INR result, warfarin dosing/INR recheck date. She is to call warfarin clinic back if she has questions.         Clinical Outcomes     Negatives:   Major bleeding event, Thromboembolic event, Anticoagulation-related hospital admission, Anticoagulation-related ED visit, Anticoagulation-related fatality    Comments:   INR done. Per patient wife, patient activity has decreased greatly, he is not eating much. He is getting home PT and being followed by homecare services. Call placed to homecare nurseGenoveva, and briefly spoke to her and then she requested I call her cellphone and leave a voicemail for her re: patient INR result, warfarin dosing/INR recheck date. She is to call warfarin clinic back if she has questions.            OBJECTIVE    INR Protime   Date Value Ref Range Status   2019 4.5 (A) 0.86 - 1.14 Final       ASSESSMENT / PLAN  INR assessment SUPRA decreased activity, decreased food intake   Recheck INR In: 1 WEEK    INR Location Clinic      Anticoagulation Summary  As of 2019    INR goal:   2.0-3.0   TTR:   54.1 % (1.5 y)   INR used for dosin.5! (2019)   Warfarin maintenance plan:   1.25 mg (2.5 mg x 0.5) every Mon, Wed, Fri; 2.5 mg (2.5 mg x 1) all other days   Full warfarin  instructions:   7/30: Hold; 7/31: Hold; Otherwise 1.25 mg every Mon, Wed, Fri; 2.5 mg all other days   Weekly warfarin total:   13.75 mg   Plan last modified:   Marylou Joshi RN (7/30/2019)   Next INR check:   8/6/2019   Target end date:       Indications    History of pulmonary embolism-bilateral on a CT scan from 1/10/18 [Z86.711]             Anticoagulation Episode Summary     INR check location:       Preferred lab:       Send INR reminders to:   [facility] ANTICOAG POOL    Comments:         Anticoagulation Care Providers     Provider Role Specialty Phone number    JABIER Kent MD [facility] [phone]            See the Encounter Report to view Anticoagulation Flowsheet and Dosing Calendar (Go to Encounters tab in chart review, and find the Anticoagulation Therapy Visit)        Marylou Joshi RN

## 2019-08-02 NOTE — TELEPHONE ENCOUNTER
10:27 AM    Reason for Call: Phone Call    Description: Genoveva ( Home Care Nurse ) called and needs orders put in for a UA / Patient has all symptoms     Was an appointment offered for this call?  No    Preferred method for responding to this message: [phone]  Nurse Genoveva    If we cannot reach you directly, may we leave a detailed response at the number you provided? Yes      Marylou Gurrola

## 2019-08-02 NOTE — TELEPHONE ENCOUNTER
Genoveva notified via telephone of UA order being placed.    Minda Charlton LPN on 8/2/2019 at 10:40 AM

## 2019-08-06 NOTE — PROGRESS NOTES
ANTICOAGULATION FOLLOW-UP CLINIC VISIT    Patient Name:  Mary Irizarry  Date:  8/6/2019  Contact Type:  Telephone/ spoke to his wife re warfarin hold/dosing. also discussed bleeding and what to do. Message left on homecare nurse, Juliana voicemail at office.     SUBJECTIVE:  Patient Findings     Positives:   Change in activity (minimal activity), Change in diet/appetite (very decreased food intake)    Comments:   INR done by lab as homecare nurse got high result. Lab INR done and it was 6.83. Call placed to homecare nurse and message left for her to call warfarin clinic. Call placed to patient home and spoke to his wife re: INR result, warfarin dosing/INR recheck date. Wife states he is not eating much, questionable if fluid intake is adequate and very minimal activity. We discussed that if he starts bleeding and she cannot get it to stop within 10 min she has to bring him to ER. She verbalized understanding of warfarin dosing and other instructions and has no questions.         Clinical Outcomes     Negatives:   Major bleeding event, Thromboembolic event, Anticoagulation-related hospital admission, Anticoagulation-related ED visit, Anticoagulation-related fatality    Comments:   INR done by lab as homecare nurse got high result. Lab INR done and it was 6.83. Call placed to homecare nurse and message left for her to call warfarin clinic. Call placed to patient home and spoke to his wife re: INR result, warfarin dosing/INR recheck date. Wife states he is not eating much, questionable if fluid intake is adequate and very minimal activity. We discussed that if he starts bleeding and she cannot get it to stop within 10 min she has to bring him to ER. She verbalized understanding of warfarin dosing and other instructions and has no questions.            OBJECTIVE    INR   Date Value Ref Range Status   08/06/2019 6.83 (HH) 0.80 - 1.20 Final     Comment:     Critical Value called to and read back by  JAMNI JUSTIN AT 6823  ON 2019 BY JAYDON         ASSESSMENT / PLAN  INR assessment SUPRA not eating much, not moving around much, minimal fluid intake (dehydration possible)   Recheck INR In: 3 DAYS    INR Location Homecare INR      Anticoagulation Summary  As of 2019    INR goal:   2.0-3.0   TTR:   53.3 % (1.5 y)   INR used for dosin.83! (2019)   Warfarin maintenance plan:   2.5 mg (2.5 mg x 1) every Mon; 1.25 mg (2.5 mg x 0.5) all other days   Full warfarin instructions:   : Hold; : Hold; : Hold; Otherwise 2.5 mg every Mon; 1.25 mg all other days   Weekly warfarin total:   10 mg   Plan last modified:   Marylou Joshi RN (2019)   Next INR check:   2019   Target end date:       Indications    History of pulmonary embolism-bilateral on a CT scan from 1/10/18 [Z86.711]             Anticoagulation Episode Summary     INR check location:       Preferred lab:       Send INR reminders to:   HC ANTICOAG POOL    Comments:         Anticoagulation Care Providers     Provider Role Specialty Phone number    JABIER Kent MD [facility] [phone]            See the Encounter Report to view Anticoagulation Flowsheet and Dosing Calendar (Go to Encounters tab in chart review, and find the Anticoagulation Therapy Visit)        Marylou Joshi RN

## 2019-08-07 PROBLEM — E11.9 DIABETES MELLITUS, TYPE 2 (H): Status: ACTIVE | Noted: 2019-01-01

## 2019-08-07 PROBLEM — Z86.711 HISTORY OF PULMONARY EMBOLISM: Status: ACTIVE | Noted: 2018-01-01

## 2019-08-07 PROBLEM — J44.9 COPD (CHRONIC OBSTRUCTIVE PULMONARY DISEASE) (H): Status: ACTIVE | Noted: 2019-01-01

## 2019-08-07 NOTE — TELEPHONE ENCOUNTER
Juliana homecare nurse calling on behalf of patient. Pt is still having low bp today. She has faxed over paperwork for med orders to be addressed and signed by provider. She will be calling back later today or tomorrow if she has not received yet.

## 2019-08-08 PROBLEM — K92.2 GI BLEED: Status: ACTIVE | Noted: 2019-01-01

## 2019-08-08 PROBLEM — K92.2 ACUTE GI BLEEDING: Status: ACTIVE | Noted: 2019-01-01

## 2019-08-08 NOTE — PLAN OF CARE
Skin assessed head to toe with pts permission during monthly skin rounds. No pressure areas noted. Keerthi KENT

## 2019-08-08 NOTE — PROVIDER NOTIFICATION
Patient reports pain in throat; paged Dr. Pichardo to request medication. Will continue to monitor/assess and watch for new orders.

## 2019-08-08 NOTE — PROGRESS NOTES
"Into see Mary for CM assessment.     Assessment completed see flowsheet. Writer introduced self and explained nature of visit. Upon asking patient about spouse listed on demo sheet, patient states, \"there are changes coming and I don't want to talk about her.\" CM moved on to other questions. Answered a few questions before stating, \"I don't really want to talk about any of this right now.\" CM obliged request and offered to return tomorrow. Pt states, \"okay. Well may be in a few days.\" CM thanked Mary for his time and will be available if needed. Provided contact information on the white board in room.    LOC: alert , patient answered only a few questions before stating, \"I don't want to talk about this today.\"    Others present: Patient     Dx: GI bleed  Chronic Disease Management:     Lives with: spouse  Living at:  Home  Transportation: states he drives, not able to confirm at this time.      Primary PCP: Dr. Uriel Kent  Insurance:  Medicare and [employer]   Medicare: Observation changed to Inpatient 8/8/19.    Support System:  TBD. Wife on file, pt states, \"I don't want to talk about her. There are changes coming.\"  Homecare/PCA: [facility], PT, OT, HHA  /[facility]:     Hume: YES He is not sure if he is connected.    Health Care Directive: not determined , deferred  Guardian: none  POA: not determined.     Pharmacy: not determined  Meds management: will need follow up, Homecare nursing     Adequate Resources for needs (housing, utilities, food/med): not able to determine  Household chores: not able to determine  Work/community/social activity: not able to determine     ADLs: Independent  Ambulation:uses walker PRN  Falls: denies \"recently\"  Nutrition: unable to determine. Homecare has concerns for adequate nutrition intake  Sleep: unable to determine    Equipment used: walker PRN          Mental health: deferred  Substance abuse: deferred  Exposure to " violence/abuse: deferred  Stressors: derferred    Able to Return to Prior Living Arrangements: unable to determine at this time    Choice of Vendor: n/a    Barriers: TBD    HEATHER: none    Plan: TBD. Currently enrolled in home care skilled services. ?SNF for short term rehab.

## 2019-08-08 NOTE — PROGRESS NOTES
Report from [facility] Homecare Nurse Genoveva.  Pt was admitted last week with Nursing, PT, OT, HHA.   Weak, not eating/drinking (poor intake). Has been declining this last month. Is on Coumadin, managed by the [facility]. Difficulty with INRs with his poor intake. Is a difficult blood draw. His wife is his support. PCP is out of the office this week, consider asking hospitalist if alternative medication would be appropriate for him versus Coumadin. May need SNF.   CM to follow.

## 2019-08-08 NOTE — PLAN OF CARE
Juliana Tom from home care; INR was 6.8 a few days ago, and pressures dropped. SNF in home for INR and circulatory assessments; getting PT and OT for strengthening biweekly; Home health aid once a week; follows with [facility] coumadin clinic. MRI of brain follow up in September d/t declining cognition. 7/25 homecare began per order Wiley Kent MD.    Juliana [phone]

## 2019-08-08 NOTE — ED NOTES
DATE:  8/7/2019   TIME OF RECEIPT FROM LAB:  2122  LAB TEST:  INR  LAB VALUE:  6.68  RESULTS GIVEN WITH READ-BACK TO (PROVIDER):  Dr. Cornejo  TIME LAB VALUE REPORTED TO PROVIDER:   2122

## 2019-08-08 NOTE — PLAN OF CARE
[facility] Inpatient Admission Note:    Patient admitted to 3214/3214-1 at approximately 2356 via bed accompanied by spouse from emergency room . Report received from Zarina RN in SBAR format at 2318 via telephone. Patient transferred to bed via self.. Patient is alert and oriented X 3, denies pain; rates at 0 on 0-10 scale.  Patient oriented to room, unit, hourly rounding, and plan of care. Explained admission packet and patient handbook with patient bill of rights brochure. Will continue to monitor and document as needed.     Inpatient Nursing criteria listed below was met:    Health care directives status obtained and documented: Yes    Care Everywhere authorization obtained No    MRSA swab completed for patient 65 years and older: Yes    Patient identifies a surrogate decision maker: Yes If yes, who:Wife Queta Contact Information:[phone]    Core Measure diagnosis present:No.      If initial lactic acid >2.0, repeat lactic acid drawn within one hour of arrival to unit: NA. If no, state reason: NA    Vaccination assessment and education completed: Yes   Vaccinations received prior to admission: Pneumovax yes  Influenza(seasonal)  YES   Vaccination(s) ordered: NA    Clergy visit ordered if patient requests: Yes    Skin issues/needs documented: Yes    Isolation Patient: no Education given, correct sign in place and documentation row added to PCS:  NA    Fall Prevention Yes: Care plan updated, education given and documented, sticker and magnet in place: Yes    Care Plan initiated: Yes    Education Documented (including assessment): Yes    Patient has discharge needs : Yes If yes, please explain:Weakness

## 2019-08-08 NOTE — PLAN OF CARE
Reason for hospital stay:  GI Hemorrhage unspecified     Most recent vitals: /69   Temp 98.3  F (36.8  C) (Temporal)   Resp 18   SpO2 96%     Pain Management:  Patient reports pain in his throat; relieved with hot tea; denies any other pain.     Orientation:  Alert and oriented to self and wife, place - disoriented to situation and time; more forgetful     Cardiac:  A-fib 60's-70's    Respiratory:  WDL; lungs are clear and dim; SpO2 mid 90's on RA     GI:  Bowel sounds active and audible; flatus passing - odor of GI bleed; no stool/no blood this shift     :  Incontinent, urgency    Nutrition:  CHO    Skin Issues:  bruised throughout; boggy heels     IVF:  NS @ 100    ABX:  NA    Mobility:  Ao1 gait belt and walker     Safety:  Near unit station, alarms on, bed low and locked; ID band on, fall band on; frequent rounding.     8/8/2019  1:09 PM  Leyda Keane    Face to face report given with opportunity to observe patient.    Report given to DONTE Moran   8/8/2019  1:14 PM

## 2019-08-08 NOTE — ED NOTES
Pt to the ED with spouse with c/o small amt of dark red blood in his stool X1 this am.  Wife reports he is on coumadin and she is concerned.  Pt denies pain.  No N/V. No hx of rectal bleeding. Pt into gown with assist of wife and nurse.  Pt has dementia per wife.  Assessment is complete.  Call light is given. Wife at side.

## 2019-08-08 NOTE — H&P
[facility]    History and Physical  Hospitalist       Date of Admission:  8/7/2019    Assessment & Plan   Mary Irizarry is a 86 year old male who presents with blood per rectum    Active Problems:   GI bleed  Assessment: present admission and no hemodynamic instability  Plan: Hold warfarin check hemoglobin in 6 hours.   Check INR in a.m.  Will not reverse methylated gradually decrease because of the high risk of turning the patient to hypercoagulable state which caused bilateral DVT and PE.       Essential hypertension, benign    Assessment: His past medical history.  He is relatively hypotensive with systolic blood pressure 97/63.  Treated with lisinopril and beta-blockers and torsemide.    Plan: Hold lisinopril continue beta-blockers.  I will also hold diuretics   Double saline at 100 cc/h      History of pulmonary embolism-bilateral on a CT scan from 1/10/18    Assessment: Patient is on room air and last echo showed no right ventricular enlargement    Plan: No acute intervention required but we will continue anticoagulation      Chronic diastolic heart failure grade 1    Assessment: Good ejection fraction last exam.  No signs of fluid overload    Plan: Continue beta-blockers, but hold ACE inhibitors and diuretics to morning.      CKD (chronic kidney disease) stage 3, GFR 30-59 ml/min (H)    Assessment: Creatinine is going up since June 17, 2019 and it was 1.1 out 1.8    Plan: Hydrate   Hold lisinopril   Check in the morning   Consider ultrasound      COPD (chronic obstructive pulmonary disease) (H)    Assessment: Per past medical history but no signs of COPD exacerbation    Plan: We will monitor and treat with neb Blazers as needed      Diabetes mellitus, type 2 (H)    Assessment: This is by his past medical history but I see her sugars are close to normal    Plan: With him on diabetic diet and monitor      DVT Prophylaxis: not needed. INR supratherapeutic.   Code Status: Full Code    Disposition:  Expected discharge in 1-2  days once Hg stable and INR trending down.     Flaco Mckeon    Primary Care Physician   JABIER Kent    Chief Complaint None    Reason for Admission: Dark red blood in the stool and  supratherapeutic INR    Admission status: Observation    History is obtained from the patient, electronic health record, emergency department physician and patient's spouse    History of Present Illness   Mary Irizarry is a 86 year old and, known to me from previous admission 2018 when he was admitted for bilateral PE, and who carries past medical history of prostate cancer, bilateral DVT, mild diabetes hypertension, hypothyroidism, COPD, who also has progressively declining memory and has scheduled appointment with neurologist in [place], was brought to emergency room because of dark blood in his stool.  Wife who takes care of him and helps him to clean himself after he moves his bowels noticed that there is black blood in his stool and was concerned that he might have GI bleed because he is anticoagulated.  Patient's INR was 1.7 July 19 and Coumadin dose was increased.    One day prior to admission his INR was 86.8 and today is the same.  Warfarin dose was adjusted per Coumadin clinic and he was supposed check INR in a few days.  His hemoglobin July 22 was 9.1 and today is 8.7.  Wife did not report any changes in his general condition and she did not notice coughing blood, hemoptysis, hematuria.  Denied lightheadedness shortness of breath or chest pain.  The rest of review of system also is negative no new positive event that he is progressively getting weaker and his memory also getting worse.    ED events: No acute events he was hemodynamically stable rectal exam revealed mild blood on examiner's glove.    ED diagnostic workup: Hemoglobin is 6.68, creatinine 1.8 when creatinine was 1.12 June 17, 2019.  His BUN is 54, which is also going up since June 17, 2019.    ED imaging studies and blood test  results: Not done this admission    ED treatment: Bolus normal saline 1000 cc    Past Medical History    I have reviewed this patient's medical history and updated it with pertinent information if needed.   Past Medical History:   Diagnosis Date     COPD (chronic obstructive pulmonary disease) (H) 8/7/2019     Diabetes mellitus, type 2 (H) 8/7/2019     Elevated troponin I level 1/11/2018     Other fatigue 10/22/2018       Past Surgical History   I have reviewed this patient's surgical history and updated it with pertinent information if needed.  Past Surgical History:   Procedure Laterality Date     GENITOURINARY SURGERY      prostatectomy       Prior to Admission Medications   Prior to Admission Medications   Prescriptions Last Dose Informant Patient Reported? Taking?   Cholecalciferol (VITAMIN D-3 PO) 8/7/2019 at Unknown time Spouse/Significant Other Yes Yes   Sig: Take 50 mcg by mouth daily   Cyanocobalamin (B-12) 1000 MCG CAPS 8/7/2019 at Unknown time Spouse/Significant Other No Yes   Sig: Take 1,000 mcg by mouth daily   Pyridoxine HCl (VITAMIN B6 PO) 8/7/2019 at Unknown time Spouse/Significant Other Yes Yes   Sig: Take 100 mg by mouth daily   doxazosin (CARDURA) 4 MG tablet 8/7/2019 at Unknown time Spouse/Significant Other No Yes   Sig: Take 1 tablet (4 mg) by mouth daily   lisinopril (PRINIVIL/ZESTRIL) 5 MG tablet 8/7/2019 at Unknown time Spouse/Significant Other No Yes   Sig: Take 1 tablet (5 mg) by mouth daily   metoprolol succinate (TOPROL-XL) 25 MG 24 hr tablet 8/7/2019 at Unknown time Spouse/Significant Other No Yes   Sig: Take 1 tablet (25 mg) by mouth daily   order for DME 8/7/2019 at Unknown time Spouse/Significant Other No Yes   Sig: Equipment being ordered: Wheelchair  Fax to [facility]   order for DME 8/7/2019 at Unknown time Spouse/Significant Other No Yes   Sig: Equipment being ordered: Walker 4 wheeled with Seat   torsemide (DEMADEX) 10 MG tablet 8/7/2019 at Unknown time Spouse/Significant Other  No Yes   Sig: Take 1 tablet (10 mg) by mouth daily   warfarin (COUMADIN) 2.5 MG tablet Past Week at Unknown time Spouse/Significant Other No Yes   Si.5mg (1 pill) daily or as directed by warfarin clinic      Facility-Administered Medications Last Administration Doses Remaining   lidocaine 1 % injection 6 mL None recorded 1   triamcinolone (KENALOG-40) injection 40 mg None recorded 1        Allergies   No Known Allergies    Social History   I have reviewed this patient's social history and updated it with pertinent information if needed. Mary Irizarry  reports that he quit smoking about 70 years ago. His smoking use included cigarettes. He started smoking about 70 years ago. He has never used smokeless tobacco. He reports that he drinks alcohol. He reports that he does not use drugs.    Family History   I have reviewed this patient's family history and updated it with pertinent information if needed.   Family History   Problem Relation Age of Onset     Hypertension Mother      Unknown/Adopted Father      No Known Problems Maternal Grandmother      No Known Problems Maternal Grandfather      No Known Problems Paternal Grandmother      No Known Problems Paternal Grandfather      No Known Problems Sister      No Known Problems Son      No Known Problems Son      Deep Vein Thrombosis (DVT) Son        Review of Systems   All 14 Systems were reviewed and found to be negative except what's mentioned in HPI    Physical Exam   Temp: 96.1  F (35.6  C) Temp src: Tympanic BP: 103/67   Heart Rate: 81 Resp: 14 SpO2: 93 % O2 Device: None (Room air)    Vital Signs with Ranges  Temp:  [96.1  F (35.6  C)] 96.1  F (35.6  C)  Heart Rate:  [] 81  Resp:  [14] 14  BP: ()/(60-68) 103/67  SpO2:  [93 %-95 %] 93 %  0 lbs 0 oz    Physical exam:   GENERAL: Awake, alert, in no distress.  Confused  HEENT: NC/AT. MMM. OP clear.   NECK: trachea midline, no JVD.   CARDIAC: Irregularly irregular regular, normal S1,S2, no S3. No  murmurs, gallops or rubs. No bruits in the neck.  2+ left lower extremity edema and no edema right lower extremity.  PULMONARY: Coarse breath sounds but no wheezing  GI: abdomen not distended and not tender on deep and superficial palpation, bowel sounds present. No hepatosplenomegaly or pulsatile masses.  Rectal exam was done by emergency room physician.  : CVA not tender, bladder not palpable.  MUSCULOSKELETAL: major joints without effusion, full range of motion, no sarcopenia.  NEUROLOGICAL: normal muscle strength and sensory exam. DTR 2/4, no myoclonus. Gait not tested.   PSYCHIATRIC: normal affect, normal mood.  Mildly confused.   INTEGUMENT: no rash, no ulcerations, warm, dry.   LYMPHATIC/HEMATOLOGIC: no LAD in the neck, both axillae, and groins. No petechiae, no ecchymoses.       Goals of care were discussed with the patient and family which included but not limited to anticipated treatment course during the current hospitalization, recovery from current event, discharge planning and transitions of care responsibilities and expected outcomes. Code status was addressed on admission as well. End of life care discussion not done.    Data   Data reviewed today:   No imaging studies or EKG done today.  Recent Labs   Lab 08/07/19  2104 08/06/19  1400   WBC 6.0  --    HGB 8.7*  --    MCV 84  --      --    INR 6.68* 6.83*     --    POTASSIUM 4.8  --    CHLORIDE 105  --    CO2 31  --    BUN 54*  --    CR 1.82*  --    ANIONGAP 5  --    RADHA 8.5  --    *  --    ALBUMIN 2.6*  --    PROTTOTAL 6.4*  --    BILITOTAL 0.4  --    ALKPHOS 62  --    ALT 36  --    AST 28  --        No results found for this or any previous visit (from the past 24 hour(s)).

## 2019-08-09 NOTE — PLAN OF CARE
Face to face report given with opportunity to observe patient.    Report given to Alicia LORENZANA RN.    Luisa Ng   8/8/2019  11:55 PM

## 2019-08-09 NOTE — PROGRESS NOTES
ANTICOAGULATION FOLLOW-UP CLINIC VISIT    Patient Name:  Mary Irizarry  Date:  8/9/2019  Contact Type:  chart review, patient inpatient at this time.    SUBJECTIVE:  Patient Findings     Positives:   Change in activity (not moving much), Change in diet/appetite (not eating or drinking fluids), Hospital admission (GI bleed)    Comments:   Patient hospitalized with GI bleed. INR supratherapeutic. Patient not eating, not drinking, very little activity. Patient has been declining rapidly over past few months. He has homecare services  Spoke with inpatient pharmacist yesterday and suggested she talk to MD regarding change of anticoagulant as they are not as affected by changes in diet/meds/activity as warfarin is.  She states she would speak to the presiding hospitalist about that.  Unknown discharge date and possible that patient will be going to LTC for rehab upon discharge.         Clinical Outcomes     Negatives:   Major bleeding event, Thromboembolic event, Anticoagulation-related hospital admission, Anticoagulation-related ED visit, Anticoagulation-related fatality    Comments:   Patient hospitalized with GI bleed. INR supratherapeutic. Patient not eating, not drinking, very little activity. Patient has been declining rapidly over past few months. He has homecare services  Spoke with inpatient pharmacist yesterday and suggested she talk to MD regarding change of anticoagulant as they are not as affected by changes in diet/meds/activity as warfarin is.  She states she would speak to the presiding hospitalist about that.  Unknown discharge date and possible that patient will be going to LTC for rehab upon discharge.            OBJECTIVE    INR   Date Value Ref Range Status   08/09/2019 4.19 (H) 0.80 - 1.20 Final       ASSESSMENT / PLAN  INR assessment SUPRA inpatient in hospital, GI bleed   Recheck INR In: 1 WEEK    INR Location Homecare INR      Anticoagulation Summary  As of 8/9/2019    INR goal:   2.0-3.0   TTR:    53.2 % (1.5 y)   INR used for dosin.19! (2019)   Warfarin maintenance plan:   2.5 mg (2.5 mg x 1) every Mon; 1.25 mg (2.5 mg x 0.5) all other days   Full warfarin instructions:   : Hold; 8/10: Hold; : Hold; : Hold; Otherwise 2.5 mg every Mon; 1.25 mg all other days   Weekly warfarin total:   10 mg   Plan last modified:   Marylou Joshi RN (2019)   Next INR check:   2019   Target end date:       Indications    History of pulmonary embolism-bilateral on a CT scan from 1/10/18 [Z86.711]             Anticoagulation Episode Summary     INR check location:       Preferred lab:       Send INR reminders to:   HC ANTICOAG POOL    Comments:         Anticoagulation Care Providers     Provider Role Specialty Phone number    JABIER Kent MD [facility] [phone]            See the Encounter Report to view Anticoagulation Flowsheet and Dosing Calendar (Go to Encounters tab in chart review, and find the Anticoagulation Therapy Visit)        Marylou Joshi RN

## 2019-08-09 NOTE — PLAN OF CARE
/66   Pulse 98   Temp 97.3  F (36.3  C) (Tympanic)   Resp 18   SpO2 96%  VSS. No c/o pain.    Lungs: clear. Abd: nontender. Bowel sounds active and audible. CHO diet tolerated well. Pt had one stool this shift. Stool brown and formed. Red streaks noted while wiping. Edema: RLE 2+ and LLE 4+. Pt Ax1 with walker.    PIV infusing NS at 100 mL/hr. Call light within reach. Alarms activated and audible.

## 2019-08-09 NOTE — PLAN OF CARE
Patient discharged at 6:43 PM via wheel chair accompanied by spouse and staff. Prescriptions - None ordered for discharge. Medication to start once INR <2. All belongings sent with patient.     Discharge instructions reviewed with patient and spouse. Listed belongings gathered and returned to patient. yes    Patient discharged to home.     Core Measures and Vaccines  Core Measures applicable during stay: No. If yes, state diagnosis: NA   Pneumonia and Influenza given prior to discharge, if indicated: N/A    Surgical Patient   Surgical Procedures during stay: None  Did patient receive discharge instruction on wound care and recognition of infection symptoms? N/A    MISC  Follow up appointment made:  Scheduling gone at the time of discharge; information given to spouse for getting a follow-up scheduled.   Home and hospital aquired medications returned to patient: N/A  Patient reports pain was well managed at discharge: Yes

## 2019-08-09 NOTE — PROGRESS NOTES
Mary Case.  Patient visit during  rounds. Patient said he is due to be discharged today.  He had no spiritual care requests.

## 2019-08-09 NOTE — PLAN OF CARE
Pt is alert, short term memory deficit, diabetic diet, Ax1 w/ walker/gait belt.  VS as charted. Lungs are dim/clear, bowels active x4, passing flatus.  Abdomen is rounded and firm.  No reports of bloody stools this shift. Edema noted to BLE,  LLE is 3+ pitting, RLE is 2+ pitting, encouraged elevation.  Good appetite, wife at bedside and is attentive.  IV was Dc'd due to pt being discharged this afternoon.  Brakes locked, alarms on, call light within reach.  Frequent rounding done, free from falls.     Face to face report given with opportunity to observe patient.    Report given to DONTE Moran   8/9/2019  3:06 PM

## 2019-08-09 NOTE — PLAN OF CARE
Reason for hospital stay:  GI Bleed    Most recent vitals: /67   Pulse 98   Temp 97.5  F (36.4  C) (Tympanic)   Resp 18   Wt 69.9 kg (154 lb 1.6 oz)   SpO2 94%   BMI 24.14 kg/m    Pain Management:  Pt denies any discomfort/pain this shift.   Orientation:  Oriented to self. Disoriented to place and time. Pt anxious throughout the night due to not knowing where he is and where his wife was although re-oriented pt continued to be confused.   Cardiac:  Apical Pulse irregular. Pulse is in the 80's. Edema in bilateral lower extremities. 2+ in right leg and 4+ in the left leg.   Respiratory:  Lungs are clear and 02 sats were 92-94% on room air.   GI:  Pt had quite a bit of GI Smelling flatus. No BM or blood noted this shift.   :  Voiding in the bathroom without difficulty multiple times  Skin Issues:  Pt has very thin dry skin. Small skin tear this shift on the Left arm that gauze was placed with coban around it. Otherwise no change in skin integrity this shift. Pt washed up this shift with a full linen change.   IVF:  NS @ 100/hr IV site has a small amount of blood under hub but still running without problems.   ABX:  None  Mobility:  Up to bathroom with SBA-Assist of 1 with a gait belt and a walker. Pt tolerates well.   Safety:  Call light within reach and encouraged to use although attempts to get out of bed frequently due to confusion.     Face to face report given with opportunity to observe patient.    Report given to Pattie Real   8/9/2019  8:42 AM

## 2019-08-09 NOTE — DISCHARGE SUMMARY
[facility]    Discharge Summary  Hospitalist    Date of Admission:  8/7/2019  Date of Discharge:  8/9/2019  6:45 PM  Discharging Provider: Nila Pichardo  Date of Service (when I saw the patient): 8/9/2019    Discharge Diagnoses   GI bleed possibly due to extrinsic circulating anticoagulant  Mild blood loss anemia due to GI bleed  Chronic normocytic anemia  Essential hypertension, benign  History of pulmonary embolism-bilateral on a CT scan from 1/10/18  Chronic diastolic heart failure grade 1   Acute kidney injury on CKD (chronic kidney disease) stage 3, GFR 30-59 ml/min   COPD (chronic obstructive pulmonary disease)   Diabetes mellitus, type 2     History of Present Illness   Mary Irizarry is a 86 year old and, known to me from previous admission 2018 when he was admitted for bilateral PE, and who carries past medical history of prostate cancer, bilateral DVT, mild diabetes hypertension, hypothyroidism, COPD, who also has progressively declining memory and has scheduled appointment with neurologist in [place], was brought to emergency room because of dark blood in his stool.  Wife who takes care of him and helps him to clean himself after he moves his bowels noticed that there is black blood in his stool and was concerned that he might have GI bleed because he is anticoagulated.  Patient's INR was 1.7 July 19 and Coumadin dose was increased.    One day prior to admission his INR was 86.8 and today is the same.  Warfarin dose was adjusted per Coumadin clinic and he was supposed check INR in a few days.  His hemoglobin July 22 was 9.1 and today is 8.7.  Wife did not report any changes in his general condition and she did not notice coughing blood, hemoptysis, hematuria.  Denied lightheadedness shortness of breath or chest pain.  The rest of review of system also is negative no new positive event that he is progressively getting weaker and his memory also getting worse.     ED events: No acute events he  was hemodynamically stable rectal exam revealed mild blood on examiner's glove.     ED diagnostic workup: Hemoglobin is 6.68, creatinine 1.8 when creatinine was 1.12 June 17, 2019.  His BUN is 54, which is also going up since June 17, 2019.     ED imaging studies and blood test results: Not done this admission     ED treatment: Bolus normal saline 1000 ml      Hospital Course   Mary Irizarry was admitted on 8/7/2019.  The following problems were addressed during his hospitalization:    GI bleed  Patient on coumadin. Recently was noted to have supratherapeutic INR and was instructed to hold his coumadin. In this setting, his wife noted some bright red blood per rectum and he was brought to ED. Was hemodynamically stable. Initial hemoglobin was 8.7. Previously was 9.1 in July 2019. Probably some mild blood loss anemia. Prior to that hemoglobin ranged 10-11 and had chronic normocytic anemia. Last hemoglobin within normal parameters was April 2018. His warfarin was held and monitored his hemoglobin q6h. No further signs of bleeding in his stools. Hemoglobin remained stable in 8-9 range, currently 8.9 on discharge. Due to history of hypercoagulability with multiple DVTs/PEs, elected not to give reversal agent for his elevated INR given no sign of ongoing active bleeding or dropping hemoglobin. Discussed option of endoscopy with patient. At this point, did not feel that we need to proceed with that unless persistent bleeding after correction of supratherapeutic INR. GI bleed possibly due to his elevated INR. Patient and his wife are in agreement. His INR has dropped 6.83> 4.19 at the time of discharge.  He has home health services already lined up. Given difficulties in managing his INR, the plan will be that they will check INR daily and to start on Eliquis after his INR is <2.    Chronic normocytic anemia  Presented with GI bleeding in setting of supratherapeutic INR as per above.  Previously hemoglobin was 9.1 in July  2019. prior to that ranged 10-11 and had chronic normocytic anemia.  Possible related to his CKD. Urgent endoscopy was not felt necessary given his GI bleeding resolved and hemoglobin remained stable. Patient to follow up with PCP and may consider outpatient endoscopy if persistent signs of GI bleeding after correction of his INR and after weighing risk/benefit given his advanced age and other co morbidities.      Essential hypertension, benign  He was relatively hypotensive with systolic blood pressure 97/63 on admission.  Still on the lower side.  Treated with lisinopril and beta-blockers and torsemide.  Held his lisinopril and diuretics, but continued beta-blockers.        History of pulmonary embolism-bilateral on a CT scan from 1/10/18  Patient is on room air and last echo showed no right ventricular enlargement. No acute intervention required, but held coumadin due to his supratherapeutic INR. His home health staff reports his INR has been difficult to manage. Discussed change to a factor X inhibitor with patient and his wife. Reviewed risks and benefits compared to warfarin. Patient was agreeable to switch to Eliquis. Discussed importance that doses cannot be missed due to shorter half life. Wife reports they are very compliant with him getting his medications. He will start Eliquis 2.5 mg PO BID once INR <2.        Chronic diastolic heart failure grade 1   Good ejection fraction last exam. Continued beta-blockers, but held ACE inhibitors and diuretics due to hypotension on admission. Did get a 1 liter fluid bolus on admission and has been on  ml/hr normal saline because he was felt to be slightly dehydrated. Got total of about 3 liters IVF. Does have some ankle edema, but no dyspnea and lung exam clear today. Will resume his diuretics and ACEI for discharge.       Acute kidney injury on CKD (chronic kidney disease) stage 3, GFR 30-59 ml/min   Creatinine up to 1.8 on admission. Was 1.12 in June 2019.  Felt to have a little prerenal azotemia due to slight dehydration. Was given IVF and held lisinopril and diuretics as per above. Creatinine improved to 1.32 now at discharge. Will resume his ACEI and diuretic for discharge.        COPD (chronic obstructive pulmonary disease)   Per past medical history but no signs of COPD exacerbation.        Diabetes mellitus, type 2    This is by his past medical history but last A1c was 5.5 in January this year and blood glucose this admission remained  range.       Pending Results   Unresulted Labs Ordered in the Past 30 Days of this Admission     No orders found from 7/8/2019 to 8/8/2019.          Code Status   Full Code       Primary Care Physician   JABIER Kent    Physical Exam   Temp: 98  F (36.7  C) Temp src: Temporal BP: 113/66   Heart Rate: 85 Resp: 18 SpO2: 94 % O2 Device: None (Room air)    Vitals:    08/09/19 0531   Weight: 69.9 kg (154 lb 1.6 oz)     Vital Signs with Ranges  Temp:  [97.5  F (36.4  C)-98.3  F (36.8  C)] 98  F (36.7  C)  Heart Rate:  [81-85] 85  Resp:  [16-18] 18  BP: (108-143)/(66-68) 113/66  SpO2:  [92 %-94 %] 94 %  I/O last 3 completed shifts:  In: 2847 [P.O.:1080; I.V.:1767]  Out: 100 [Urine:100]    Constitutional: Alert and oriented X 3. No distress   Respiratory: CTA bilaterally. No wheezes or ronchi.    Cardiovascular: RRR. No murmurs, rubs, gallops. Normal S1/S2   GI: Soft, NTND, no organomegaly. Bowel sounds present   Integument: Warm, dry   Extremities:  No edema     Discharge Disposition   Discharged to home  Condition at discharge: Stable    Consultations This Hospital Stay   SOCIAL WORK IP CONSULT    Time Spent on this Encounter   INila, personally saw the patient today and spent greater than 30 minutes discharging this patient.    Discharge Orders      Follow-up and recommended labs and tests     Follow up with primary care provider, JABIER Kent, within 7-10 days for hospital follow- up.  No follow up labs or test  are needed.     Discharge Instructions    Home health nurse to draw daily INR until INR <2. Once INR is <2, patient should be instructed to to start taking Eliquis 2.5 mg PO BID  Resume orders already in place for home PT/OT/RN and health aid     When to contact your care team    Call your primary doctor if you have any of the following: persistent blood in stool.     Activity    Your activity upon discharge: activity as tolerated     Full Code     Diet    Follow this diet upon discharge: Orders Placed This Encounter      Moderate Consistent CHO Diet     Discharge Medications   Current Discharge Medication List      START taking these medications    Details   apixaban ANTICOAGULANT (ELIQUIS) 2.5 MG tablet Once INR <2, then start taking 2.5 mg PO BID  Qty: 60 tablet, Refills: 0    Associated Diagnoses: History of pulmonary embolism         CONTINUE these medications which have NOT CHANGED    Details   Cholecalciferol (VITAMIN D-3 PO) Take 50 mcg by mouth daily      Cyanocobalamin (B-12) 1000 MCG CAPS Take 1,000 mcg by mouth daily  Qty: 90 capsule, Refills: 3    Associated Diagnoses: Primary hypercoagulable state (H)      doxazosin (CARDURA) 4 MG tablet Take 1 tablet (4 mg) by mouth daily  Qty: 90 tablet, Refills: 3    Associated Diagnoses: Essential hypertension, benign      lisinopril (PRINIVIL/ZESTRIL) 5 MG tablet Take 1 tablet (5 mg) by mouth daily  Qty: 90 tablet, Refills: 3    Associated Diagnoses: Diastolic dysfunction      metoprolol succinate (TOPROL-XL) 25 MG 24 hr tablet Take 1 tablet (25 mg) by mouth daily  Qty: 93 tablet, Refills: 3    Associated Diagnoses: Diastolic dysfunction; Essential hypertension, benign; New onset a-fib (H)      !! order for DME Equipment being ordered: Walker 4 wheeled with Seat  Qty: 1 Units, Refills: 0    Associated Diagnoses: Generalized muscle weakness; Sedentary lifestyle; Physical deconditioning      !! order for DME Equipment being ordered: Wheelchair  Fax to  [facility]  Qty: 1 Device, Refills: 0    Associated Diagnoses: Generalized muscle weakness; Dyspnea on exertion      Pyridoxine HCl (VITAMIN B6 PO) Take 100 mg by mouth daily      torsemide (DEMADEX) 10 MG tablet Take 1 tablet (10 mg) by mouth daily  Qty: 90 tablet, Refills: 3    Associated Diagnoses: Dyspnea on exertion; Diastolic dysfunction; Chronic diastolic heart failure (H); CKD (chronic kidney disease) stage 3, GFR 30-59 ml/min (H)       !! - Potential duplicate medications found. Please discuss with provider.      STOP taking these medications       warfarin (COUMADIN) 2.5 MG tablet Comments:   Reason for Stopping:             Allergies   No Known Allergies  Data   Most Recent 3 CBC's:  Recent Labs   Lab Test 08/14/19  0505 08/13/19  1259 08/09/19  1203 08/09/19  0542   WBC 5.6 6.2  --  5.8   HGB 7.9* 7.8* 8.9* 9.0*   MCV 82 84  --  84    180  --  175      Most Recent 3 BMP's:  Recent Labs   Lab Test 08/14/19  0505 08/13/19  1259 08/09/19  0542    141 142   POTASSIUM 3.7 4.6 4.2   CHLORIDE 108 109 113*   CO2 29 24 26   BUN 39* 39* 39*   CR 1.32* 1.53* 1.31*   ANIONGAP 4 8 3   RADHA 7.9* 7.8* 8.3*   GLC 72 82 87     Most Recent 2 LFT's:  Recent Labs   Lab Test 08/13/19  1259 08/07/19  2104   AST 21 28   ALT 37 36   ALKPHOS 63 62   BILITOTAL 0.5 0.4     Most Recent INR's and Anticoagulation Dosing History:  Anticoagulation Dose History     Recent Dosing and Labs Latest Ref Rng & Units 7/19/2019 7/30/2019 8/6/2019 8/7/2019 8/8/2019 8/9/2019 8/12/2019    INR 0.80 - 1.20 - - 6.83(HH) 6.68(HH) 6.11(HH) 4.19(H) 1.76(H)    INR 0.86 - 1.14 1.7(A) 4.5(A) - - - - -        Most Recent 3 Troponin's:  Recent Labs   Lab Test 08/13/19  1259 01/14/19  1252 01/14/19  0945   TROPI <0.015 0.052* 0.055*     Most Recent Cholesterol Panel:  Recent Labs   Lab Test 02/12/18   CHOL 180      HDL 65   TRIG 63     Most Recent 6 Bacteria Isolates From Any Culture (See EPIC Reports for Culture Details):  Recent Labs    Lab Test 08/08/19  0205 01/11/18  0215   CULT No MRSA isolated No MRSA isolated     Most Recent TSH, T4 and A1c Labs:  Recent Labs   Lab Test 07/22/19  1006 01/21/19  0956   TSH 1.14  --    A1C  --  5.5     Results for orders placed or performed during the hospital encounter of 07/29/19   MR Brain w/o Contrast    Narrative    PROCEDURE: MR BRAIN W/O CONTRAST 7/29/2019 11:30 AM    HISTORY: Encephalopathy; Memory loss    COMPARISONS: None.    Meds/Dose Given:    TECHNIQUE: Images were obtained sagittally T1 weighted. Images were  obtained axially diffusion FLAIR gradient echo T1 and T2-weighted.    FINDINGS: There is generalized enlargement of the ventricular system  either due to communicating hydrocephalus or atrophy. There is  extensive white matter low-density both hemispheres consistent with  small vessel disease. There are no masses ventricular shifts or  extracerebral collections. Small vessel changes are seen in the  brainstem. The cerebellum appears normal. The pituitary and optic  chiasm appear normal. The basal cisterns and internal auditory canals  are normal. The cranial vault is intact. The paranasal sinuses are  clear.         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    CLAUDIA MONTEJO MD

## 2019-08-09 NOTE — PROGRESS NOTES
Wife here with [facility] to complete CM assessment as initial assessment with patient incomplete related to patient confusion. Reviewed Inpatient Medicare Letter with spouse. Wife is caregiver of patient. Confirms, he does not drive, he is home bound. Recently admitted to home care services. [facility]. [facility].    Wife shares she is working with [facility] for resp[facility] care & chair lift. Has a rollator walker. Denies recent falls but increased weakness. No home oxygen.     CM Continues to follow for discharge planning.

## 2019-08-09 NOTE — PROGRESS NOTES
[facility]ary.  Eliquis and Xarelto are both on the formulary but will need [facility] approval with the failure of Coumadin/Warfain.      has 30 day supply coupons available for discharge for either medication to get patient started upon discharge.

## 2019-08-09 NOTE — PROGRESS NOTES
ANTICOAGULATION FOLLOW-UP CLINIC VISIT    Patient Name:  Mary Irizarry  Date:  8/9/2019  Contact Type:  Telephone/ spoke to Dr. Letty Pichardo and also spoke to the homecare nurse on call this weekend, Leticia    SUBJECTIVE:  Patient Findings     Positives:   Change in medications (will be starting DOAC when INR is 2.0 or less)    Comments:   Call from Letty Pichardo MD, stating she is discharging patient to home today. She states she does want him switch to a DOAC but cannot do that until INR is less than 2.0. She states he will still be getting homecare so INR's can be done daily. We discussed that she can still order them med but not have him take it until he is told to do so, when INR is 2.0 or less.  I told her I would also call the homecare nurse on call this weekend and talk to her about doing the daily INR's and if INR is 2.0 or lower this weekend, she can tell patient wife to start his DOAC.  I did call [facility] and spoke to the on call nurse for the weekend, Leticia, and explained to her that patient needs daily INR's as he will be starting a new anticoagulant but the he cannot start it unless INR is 2.0 or below. Homecare nurse verbalized understanding and has no questions. Patient will be discharged from warfarin clinic as soon as I know he started the DOAC        Clinical Outcomes     Comments:   Call from Letty Pichardo MD, stating she is discharging patient to home today. She states she does want him switch to a DOAC but cannot do that until INR is less than 2.0. She states he will still be getting homecare so INR's can be done daily. We discussed that she can still order them med but not have him take it until he is told to do so, when INR is 2.0 or less.  I told her I would also call the homecare nurse on call this weekend and talk to her about doing the daily INR's and if INR is 2.0 or lower this weekend, she can tell patient wife to start his DOAC.  [facility] and spoke to the on  call nurse for the weekend, Leticia, and explained to her that patient needs daily INR's as he will be starting a new anticoagulant but the he cannot start it unless INR is 2.0 or below. Homecare nurse verbalized understanding and has no questions. Patient will be discharged from warfarin clinic as soon as I know he started the DOAC           OBJECTIVE    INR   Date Value Ref Range Status   08/09/2019 4.19 (H) 0.80 - 1.20 Final       ASSESSMENT / PLAN  No question data found.  Anticoagulation Summary  As of 8/9/2019    INR goal:   2.0-3.0   TTR:   53.2 % (1.5 y)   INR used for dosing:   No new INR was available at the time of this encounter.   Warfarin maintenance plan:   2.5 mg (2.5 mg x 1) every Mon; 1.25 mg (2.5 mg x 0.5) all other days   Full warfarin instructions:   8/9: Hold; 8/10: Hold; 8/11: Hold; 8/12: Hold; Otherwise 2.5 mg every Mon; 1.25 mg all other days   Weekly warfarin total:   10 mg   No change documented:   Marylou Joshi RN   Plan last modified:   Marylou Joshi RN (8/6/2019)   Next INR check:   8/12/2019   Target end date:       Indications    History of pulmonary embolism-bilateral on a CT scan from 1/10/18 [Z86.711]             Anticoagulation Episode Summary     INR check location:       Preferred lab:       Send INR reminders to:   [facility] ANTICOAG POOL    Comments:         Anticoagulation Care Providers     Provider Role Specialty Phone number    JABIER Kent MD [facility] [phone]            See the Encounter Report to view Anticoagulation Flowsheet and Dosing Calendar (Go to Encounters tab in chart review, and find the Anticoagulation Therapy Visit)        Marylou Joshi RN

## 2019-08-10 NOTE — PROGRESS NOTES
CM spoke with Max, [facility] with plans for discharge with new prescription for Eliquis 2.5mg twice daily. Will need INR as ordered by MD for direction when to start Eliquis.  Orders faxed via epic communications to [facility]. Resuming home care services.     30 day supply coupon provided to wife prior to discharge. Will need to give to the pharmacy. Wife will  this prescription from [facility]. [facility]

## 2019-08-12 NOTE — PROGRESS NOTES
ANTICOAGULATION FOLLOW-UP CLINIC VISIT    Patient Name:  Mary Irizarry  Date:  2019  Contact Type:  Telephone/ spoke to homecare nurseLeticia. Patient to start Eliquis and discharged from warfarin clinic    SUBJECTIVE:  Patient Findings     Positives:   Change in medications (anticoagulant changed to Eliquis)    Comments:   Call from homecare nurseLeticia, re: INR result today is 1.7.  He will start Eliquis today as INR is less than 2.0.  His wife know to throw out warfarin as he will be on Eliquis only  He will be discharged from the warfarin clinic as INR's are no longer needed.         Clinical Outcomes     Negatives:   Major bleeding event, Thromboembolic event, Anticoagulation-related hospital admission, Anticoagulation-related ED visit, Anticoagulation-related fatality    Comments:   Call from homecare nurseLeticia, re: INR result today is 1.7.  He will start Eliquis today as INR is less than 2.0.  His wife know to throw out warfarin as he will be on Eliquis only  He will be discharged from the warfarin clinic as INR's are no longer needed.            OBJECTIVE    INR   Date Value Ref Range Status   2019 1.76 (H) 0.80 - 1.20 Final       ASSESSMENT / PLAN  No question data found.  Anticoagulation Summary  As of 2019    INR goal:   2.0-3.0   TTR:   53.2 % (1.5 y)   INR used for dosin.76! (2019)   Warfarin maintenance plan:   2.5 mg (2.5 mg x 1) every Mon; 1.25 mg (2.5 mg x 0.5) all other days   Full warfarin instructions:   : Hold; Otherwise 2.5 mg every Mon; 1.25 mg all other days   Weekly warfarin total:   10 mg   Plan last modified:   Marylou Joshi RN (2019)   Next INR check:      Target end date:       Indications    History of pulmonary embolism-bilateral on a CT scan from 1/10/18 [Z86.711]             Anticoagulation Episode Summary     INR check location:       Preferred lab:       Resolved date:   2019    Resolved reason:   Changed Anticoagulant     Send INR  reminders to:   [facility] ANTICOAG POOL    Comments:         Anticoagulation Care Providers     Provider Role Specialty Phone number    JABIER Kent MD [facility] [phone]            See the Encounter Report to view Anticoagulation Flowsheet and Dosing Calendar (Go to Encounters tab in chart review, and find the Anticoagulation Therapy Visit)        Marylou Joshi RN

## 2019-08-12 NOTE — ED PROVIDER NOTES
History     Chief Complaint   Patient presents with     Rectal Bleeding     small amt of rectal bleeding with bm today. denies abd pain. wife concerned due to pt take coumadin     The history is provided by the patient.   Rectal Bleeding   Quality:  Bright red  Amount:  Moderate  Timing:  Constant  Chronicity:  New  Context: spontaneously    Context: not anal fissures, not hemorrhoids, not rectal injury and not rectal pain    Associated symptoms: no abdominal pain, no dizziness, no fever and no light-headedness      Mary Irizarry is a 86 year old male who     Allergies:  No Known Allergies    Problem List:    Patient Active Problem List    Diagnosis Date Noted     GI bleed 08/08/2019     Priority: Medium     Acute GI bleeding 08/08/2019     Priority: Medium     COPD (chronic obstructive pulmonary disease) (H) 08/07/2019     Priority: Medium     Diabetes mellitus, type 2 (H) 08/07/2019     Priority: Medium     CKD (chronic kidney disease) stage 3, GFR 30-59 ml/min (H) 05/29/2019     Priority: Medium     Weakness of both lower extremities 05/29/2019     Priority: Medium     Physical deconditioning 05/29/2019     Priority: Medium     Paroxysmal atrial fibrillation (H) 01/21/2019     Priority: Medium     Lower extremity edema 01/21/2019     Priority: Medium     Generalized muscle weakness 01/21/2019     Priority: Medium     Dyspnea on exertion 11/26/2018     Priority: Medium     Sedentary lifestyle 10/22/2018     Priority: Medium     New onset a-fib diagnosed 10/22/18 10/22/2018     Priority: Medium     History of pulmonary embolism-bilateral on a CT scan from 1/10/18 10/22/2018     Priority: Medium     LAE (left atrial enlargement)-mild 10/22/2018     Priority: Medium     On Coumadin for atrial fibrillation 10/22/2018     Priority: Medium     Chronic diastolic heart failure grade 1 on 1/11/18 10/22/2018     Priority: Medium     Other fatigue 10/22/2018     Priority: Medium     Diastolic dysfunction grade 1 on 1/10/18  10/09/2018     Priority: Medium     Acute respiratory failure with hypoxia (H) 2018     Priority: Medium     Hypoxia 2018     Priority: Medium     Lesion of left pinna 2017     Priority: Medium     Essential hypertension, benign 2017     Priority: Medium     ACP (advance care planning) 2016     Priority: Medium     Advance Care Planning 2016: ACP Review of Chart / Resources Provided:  Reviewed chart for advance care plan.  Josébren Harleyedgar has no plan or code status on file. Discussed available resources and declined information.Carlie Alicea             Facial lesion 2015     Priority: Medium     Left inguinal hernia 2015     Priority: Medium        Past Medical History:    Past Medical History:   Diagnosis Date     COPD (chronic obstructive pulmonary disease) (H) 2019     Diabetes mellitus, type 2 (H) 2019     Elevated troponin I level 2018     Other fatigue 10/22/2018       Past Surgical History:    Past Surgical History:   Procedure Laterality Date     GENITOURINARY SURGERY      prostatectomy       Family History:    Family History   Problem Relation Age of Onset     Hypertension Mother      Unknown/Adopted Father      No Known Problems Maternal Grandmother      No Known Problems Maternal Grandfather      No Known Problems Paternal Grandmother      No Known Problems Paternal Grandfather      No Known Problems Sister      No Known Problems Son      No Known Problems Son      Deep Vein Thrombosis (DVT) Son        Social History:  Marital Status:   [2]  Social History     Tobacco Use     Smoking status: Former Smoker     Types: Cigarettes     Start date: 1949     Last attempt to quit: 3/1/1949     Years since quittin.4     Smokeless tobacco: Never Used   Substance Use Topics     Alcohol use: Yes     Alcohol/week: 0.0 oz     Comment: occa     Drug use: No        Medications:      apixaban ANTICOAGULANT (ELIQUIS) 2.5 MG tablet    Cholecalciferol (VITAMIN D-3 PO)   Cyanocobalamin (B-12) 1000 MCG CAPS   doxazosin (CARDURA) 4 MG tablet   lisinopril (PRINIVIL/ZESTRIL) 5 MG tablet   metoprolol succinate (TOPROL-XL) 25 MG 24 hr tablet   order for DME   order for DME   Pyridoxine HCl (VITAMIN B6 PO)   torsemide (DEMADEX) 10 MG tablet         Review of Systems   Unable to perform ROS: Dementia   Constitutional: Negative for fever.   HENT: Negative for congestion.    Eyes: Negative for redness.   Respiratory: Negative for shortness of breath.    Cardiovascular: Negative for chest pain.   Gastrointestinal: Positive for hematochezia. Negative for abdominal pain.   Genitourinary: Negative for difficulty urinating.   Musculoskeletal: Negative for arthralgias and neck stiffness.   Skin: Negative for color change.   Neurological: Negative for dizziness, light-headedness and headaches.   Psychiatric/Behavioral: Negative for confusion.   All other systems reviewed and are negative.      Physical Exam   BP: 101/60  Pulse: 98  Heart Rate: 104  Temp: 96.1  F (35.6  C)  Resp: 14  Weight: 69.9 kg (154 lb 1.6 oz)  SpO2: 95 %  Lying Orthostatic BP: 111/68  Lying Orthostatic Pulse: 90 bpm  Sitting Orthostatic BP: 108/63  Sitting Orthostatic Pulse: 91 bpm  Standing Orthostatic BP: 104/66  Standing Orthostatic Pulse: 112 bpm      Physical Exam   Constitutional: He is oriented to person, place, and time. He appears well-developed and well-nourished.   HENT:   Head: Normocephalic and atraumatic.   Eyes: Pupils are equal, round, and reactive to light. Conjunctivae are normal.   Neck: Normal range of motion. Neck supple. No JVD present. No tracheal deviation present. No thyromegaly present.   Cardiovascular: Normal rate, regular rhythm, normal heart sounds and intact distal pulses. Exam reveals no gallop.   No murmur heard.  Pulmonary/Chest: Effort normal and breath sounds normal. No stridor. No respiratory distress. He has no wheezes. He has no rales. He exhibits no  tenderness.   Abdominal: Soft. Bowel sounds are normal. He exhibits no distension and no mass. There is no tenderness. There is no rebound and no guarding.   Genitourinary: Rectal exam shows guaiac positive stool. Rectal exam shows no external hemorrhoid, no internal hemorrhoid, no fissure and anal tone normal.   Genitourinary Comments: Bright red stool   Musculoskeletal: Normal range of motion. He exhibits no edema or tenderness.   Lymphadenopathy:     He has no cervical adenopathy.   Neurological: He is alert and oriented to person, place, and time.   Skin: Skin is warm. No rash noted. No erythema. No pallor.   Psychiatric: His behavior is normal.   Nursing note and vitals reviewed.      ED Course        Procedures                   No results found for this or any previous visit (from the past 24 hour(s)).    Medications   0.9% sodium chloride BOLUS (0 mLs Intravenous Stopped 8/7/19 2201)   pantoprazole (PROTONIX) 40 mg IV push injection (40 mg Intravenous Given 8/7/19 2105)       Assessments & Plan (with Medical Decision Making)   Lower GI BLeed  INR 6  Pt high risk, need admission for monitoring, serial H&H   Spoke to Dr Turpin, accepted for admission  I have reviewed the nursing notes.    I have reviewed the findings, diagnosis, plan and need for follow up with the patient.      Discharge Medication List as of 8/9/2019  6:26 PM      START taking these medications    Details   apixaban ANTICOAGULANT (ELIQUIS) 2.5 MG tablet Once INR <2, then start taking 2.5 mg PO BID, Disp-60 tablet, R-0, E-Prescribe             Final diagnoses:   Gastrointestinal hemorrhage, unspecified gastrointestinal hemorrhage type       8/7/2019   [facility]     Tommy Cornejo MD  08/11/19 3871

## 2019-08-12 NOTE — TELEPHONE ENCOUNTER
2:28 PM    Reason for Call: OVERBOOK    Patient is having the following symptoms: Follow up hosp - blood in stool for 0 days.    The patient is requesting an appointment for overbook for 7 to 10 days from discharge on 08-09-19 with Dr. RUTH Kent.    Was an appointment offered for this call? No  If yes : Appointment type              Date    Preferred method for responding to this message: Telephone Call  What is your phone number ? [phone]    If we cannot reach you directly, may we leave a detailed response at the number you provided? Yes    Can this message wait until your PCP/provider returns, if unavailable today? Not applicable    Salena Faria

## 2019-08-13 PROBLEM — R29.898 LEG WEAKNESS: Status: ACTIVE | Noted: 2019-01-01

## 2019-08-13 NOTE — ED NOTES
"\"Here for breathing heavier than normal and weakness in the legs, normal blood pressure is 80s-90s\" per wife  "

## 2019-08-13 NOTE — H&P
[facility]    History and Physical  Hospitalist       Date of Admission:  8/13/2019  Date of Service (when I saw the patient): 08/13/19    Assessment & Plan   Mary Irizarry is a 86 year old male with history of paroxysmal atrial fibrillation on warfarin, history of DVT/PE, history of grade 1 diastolic dysfunction who presents with lower extremity weakness, work-up most consistent at this point with mild heart failure.    Heart failure with preserved ejection fraction: Last echo was in January 2018 which showed EF of 55 to 60%, grade 1 diastolic dysfunction.  He had borderline right ventricular enlargement as well.  His chest x-ray has a right pleural effusion and he has bilateral fullness.  BNP is elevated at about 9000.  -Lasix drip due to soft blood pressures  -Accurate ins and outs  -Daily weights  -We will assess him daily for volume status and necessity for further diuresis  -Will hold CV meds for the time being (he is on ACE and beta blocker)    Lower extremity weakness: He does not appear to have any thing acute as far as infection is concerned.  He is afebrile, no leukocytosis, no focal symptoms.  Most likely multifactorial due to his age and his heart failure as well as anemia.  -PT/OT evaluation    Anemia: Patient was recently admitted here and discharged on 8/9/2019 for GI bleeding.  EGD/colonoscopy was not done due to stability of hemoglobin.  The patient was previously on warfarin, was transitioned to Xarelto when he was discharged on 8/9.  -trend and monitor hgb  -transfuse as needed for hgb < 7 or acute blood loss    CKD stage 3: Cr seems stable at 1.53, perhaps some mild MELECIO, but his Cr has been in this range.  -monitor renal function and UOP in the setting of gentle diuresis    COPD: no signs of exacerbation    NIDDM2: diet controlled.  No oral meds nor insulin at home  -monitor blood sugars prn    DVT Prophylaxis: Xarelto    Code Status: Full Code    Disposition: Expected discharge in  1-2 days.    Selena Brown MD      Primary Care Physician   JABIER Kent    Chief Complaint   LE weakness    History is obtained from the patient and wife at bedside.    History of Present Illness   Mary Irizarry is a 86 year old male with history of diastolic heart failure grade 1, paroxysmal atrial fibrillation on warfarin, history of pulmonary emboli, and recent admission for GI bleeding discharged this past Friday who presents with lower extremity weakness since this morning.  Wife states that weakness is chronic, however this morning it was more profound than usual.  Patient appears somewhat lethargic although he does awake and interact appropriately.  He is able to answer questions.  He denies any constitutional symptoms, only that he is weaker than usual.  He denies any fevers or chills.  He denies any pain of any sort.  He denies any dysuria.  He specifically denies any shortness of breath.  Work-up revealed a modest drop in hemoglobin from 8.9 to 7.8.  The patient and wife deny any further bleeding from the rectum.  His blood work revealed elevated BNP of ~9000 and perhaps some acute on chronic renal failure.  His blood pressures were soft, but according to wife the patient's blood pressure is usually 80s and systolic.  His chest x-ray does reveal a right-sided effusion which was not present compared to previous chest x-ray obtained in 2018.  In addition he does have some bilateral fullness.  In conjunction with his lower extremity edema, we will admit him for some diuresis.    Past Medical History    I have reviewed this patient's medical history and updated it with pertinent information if needed.   Past Medical History:   Diagnosis Date     COPD (chronic obstructive pulmonary disease) (H) 8/7/2019     Diabetes mellitus, type 2 (H) 8/7/2019     Elevated troponin I level 1/11/2018     Other fatigue 10/22/2018       Past Surgical History   I have reviewed this patient's surgical history and updated it  with pertinent information if needed.  Past Surgical History:   Procedure Laterality Date     GENITOURINARY SURGERY      prostatectomy       Prior to Admission Medications   Prior to Admission Medications   Prescriptions Last Dose Informant Patient Reported? Taking?   Cholecalciferol (VITAMIN D-3 PO) 8/12/2019 at Unknown time Spouse/Significant Other Yes Yes   Sig: Take 50 mcg by mouth daily   Cyanocobalamin (B-12) 1000 MCG CAPS 8/12/2019 at Unknown time Spouse/Significant Other No Yes   Sig: Take 1,000 mcg by mouth daily   Pyridoxine HCl (VITAMIN B6 PO) 8/12/2019 at Unknown time Spouse/Significant Other Yes Yes   Sig: Take 100 mg by mouth daily   apixaban ANTICOAGULANT (ELIQUIS) 2.5 MG tablet 8/12/2019 at Unknown time  No Yes   Sig: Once INR <2, then start taking 2.5 mg PO BID   lisinopril (PRINIVIL/ZESTRIL) 5 MG tablet 8/12/2019 at Unknown time Spouse/Significant Other No Yes   Sig: Take 1 tablet (5 mg) by mouth daily   metoprolol succinate (TOPROL-XL) 25 MG 24 hr tablet 8/12/2019 at Unknown time Spouse/Significant Other No Yes   Sig: Take 1 tablet (25 mg) by mouth daily   order for DME  Spouse/Significant Other No No   Sig: Equipment being ordered: Wheelchair  Fax to [facility]   order for DME  Spouse/Significant Other No Yes   Sig: Equipment being ordered: Walker 4 wheeled with Seat   torsemide (DEMADEX) 10 MG tablet 8/12/2019 at Unknown time Spouse/Significant Other No Yes   Sig: Take 1 tablet (10 mg) by mouth daily      Facility-Administered Medications Last Administration Doses Remaining   lidocaine 1 % injection 6 mL None recorded 1   triamcinolone (KENALOG-40) injection 40 mg None recorded 1        Allergies   No Known Allergies    Social History   I have reviewed this patient's social history and updated it with pertinent information if needed. Mary Irizarry  reports that he quit smoking about 70 years ago. His smoking use included cigarettes. He started smoking about 70 years ago. He has never used  smokeless tobacco. He reports that he drinks alcohol. He reports that he does not use drugs.    Family History   I have reviewed this patient's family history and updated it with pertinent information if needed.   Family History   Problem Relation Age of Onset     Hypertension Mother      Unknown/Adopted Father      No Known Problems Maternal Grandmother      No Known Problems Maternal Grandfather      No Known Problems Paternal Grandmother      No Known Problems Paternal Grandfather      No Known Problems Sister      No Known Problems Son      No Known Problems Son      Deep Vein Thrombosis (DVT) Son        Review of Systems   The 10 point Review of Systems is negative other than noted in the HPI or here.     Physical Exam   Temp: 97.4  F (36.3  C) Temp src: Tympanic BP: (!) 86/60   Heart Rate: 68 Resp: 12 SpO2: 100 % O2 Device: None (Room air)    Vital Signs with Ranges  Temp:  [97.4  F (36.3  C)] 97.4  F (36.3  C)  Heart Rate:  [68-93] 68  Resp:  [12-20] 12  BP: (77-97)/(50-67) 86/60  SpO2:  [92 %-100 %] 100 %  165 lbs 0 oz    Constitutional: AA, NAD  Eyes: PERRLA, no injection, no icterus  HEENT: atraumatic, normocephalic  Respiratory: CTA b/l  Cardiovascular: S1 S2 RRR  GI: soft, NT, ND, + bowel sounds  Lymph/Hematologic: no palpable lymphadenopathy  Skin: no rashes, no lesions  Musculoskeletal: 1-2+ pitting edema b/l, good tone, no deformities  Neurologic: oriented x 3, no focal deficits  Psychiatric: appropriate affect    Data   Data reviewed today:  I personally reviewed EKG, CXR reports.  Recent Labs   Lab 08/13/19  1259 08/12/19  1012 08/09/19  1203 08/09/19  0736 08/09/19  0542  08/08/19  0528 08/07/19  2104   WBC 6.2  --   --   --  5.8  --  5.1 6.0   HGB 7.8*  --  8.9*  --  9.0*   < > 8.2* 8.7*   MCV 84  --   --   --  84  --  83 84     --   --   --  175  --  163 196   INR  --  1.76*  --  4.19*  --   --  6.11* 6.68*     --   --   --  142  --  142 141   POTASSIUM 4.6  --   --   --  4.2  --   4.1 4.8   CHLORIDE 109  --   --   --  113*  --  110* 105   CO2 24  --   --   --  26  --  30 31   BUN 39*  --   --   --  39*  --  48* 54*   CR 1.53*  --   --   --  1.31*  --  1.65* 1.82*   ANIONGAP 8  --   --   --  3  --  2* 5   RADHA 7.8*  --   --   --  8.3*  --  7.8* 8.5   GLC 82  --   --   --  87  --  75 111*   ALBUMIN 2.3*  --   --   --   --   --   --  2.6*   PROTTOTAL 5.6*  --   --   --   --   --   --  6.4*   BILITOTAL 0.5  --   --   --   --   --   --  0.4   ALKPHOS 63  --   --   --   --   --   --  62   ALT 37  --   --   --   --   --   --  36   AST 21  --   --   --   --   --   --  28   TROPI <0.015  --   --   --   --   --   --   --     < > = values in this interval not displayed.          Recent Results (from the past 24 hour(s))   XR Chest Port 1 View    Narrative    PROCEDURE:  XR CHEST PORT 1 VW    HISTORY:  shortness of breath.     COMPARISON:  None.    FINDINGS:   Heart is enlarged there is a right-sided pleural effusion. There is  some questionable thickening in the major fissure on the left. There  is some linear atelectasis or scarring seen at both lung bases. severe  arthritic changes are noted at the glenohumeral articulations  bilaterally.      Impression    IMPRESSION:  Cardiomegaly and right-sided pleural effusion.  Questionable fluid in the major fissure on the left.      CLAUDIA MONTEJO MD

## 2019-08-13 NOTE — PROGRESS NOTES
[facility] Inpatient Admission Note:    Patient admitted to 3104/3104-1 at approximately 1500 via cart accompanied by spouse from emergency room . Report received from ZACK Bustos RN  in SBAR format at 1510 via telephone. Patient transferred to bed via slide board.. Patient is alert and oriented X 3, denies pain; rates at 0 on 0-10 scale.  Patient oriented to room, unit, hourly rounding, and plan of care. Explained admission packet and patient handbook with patient bill of rights brochure. Will continue to monitor and document as needed.     Inpatient Nursing criteria listed below was met:    Health care directives status obtained and documented: Yes    Care Everywhere authorization obtained Yes    MRSA swab completed for patient 65 years and older: No    Patient identifies a surrogate decision maker: Yes If yes, who:SPOUSE Contact Information:IN ROOM    Core Measure diagnosis present:Yes - If yes, state diagnosis: WEAKNESS.      If initial lactic acid >2.0, repeat lactic acid drawn within one hour of arrival to unit: NA. If no, state reason: WEAKNESS    Vaccination assessment and education completed: Yes   Vaccinations received prior to admission: Pneumovax UKN  Influenza(seasonal)  N/A   Vaccination(s) ordered: UKN      Clergy visit ordered if patient requests: No    Skin issues/needs documented: No    Isolation Patient: no Education given, correct sign in place and documentation row added to PCS:  Yes    Fall Prevention Yes: Care plan updated, education given and documented, sticker and magnet in place: Yes    Care Plan initiated: Yes    Education Documented (including assessment): Yes    Patient has discharge needs : Yes If yes, please explain:WEAKNESS

## 2019-08-13 NOTE — ED PROVIDER NOTES
History     Chief Complaint   Patient presents with     Generalized Weakness     c/o weakness and shortness of breath     The history is provided by the patient.     Mary Irizarry is a 86 year old male who presented to the emergency department via EMS for evaluation of generalized weakness and some mild dyspnea.  Patient denies any questions or concerns for me.  Wife called 911 as the patient was having some mild shortness of breath when he was walking around today.  He does have a past medical history of recent acute GI bleed secondary to supratherapeutic INR.  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  He denies any chest pains.  Denies any current shortness of breath.  Denies any abdominal pain.  Denies any vomiting.  Denies any lower urinary tract symptoms.  Also has a history of paroxysmal atrial fibrillation with congestive heart failure and CKD.  Well-established hypotension at her baseline.    Allergies:  No Known Allergies    Problem List:    Patient Active Problem List    Diagnosis Date Noted     GI bleed 08/08/2019     Priority: Medium     Acute GI bleeding 08/08/2019     Priority: Medium     COPD (chronic obstructive pulmonary disease) (H) 08/07/2019     Priority: Medium     Diabetes mellitus, type 2 (H) 08/07/2019     Priority: Medium     CKD (chronic kidney disease) stage 3, GFR 30-59 ml/min (H) 05/29/2019     Priority: Medium     Weakness of both lower extremities 05/29/2019     Priority: Medium     Physical deconditioning 05/29/2019     Priority: Medium     Paroxysmal atrial fibrillation (H) 01/21/2019     Priority: Medium     Lower extremity edema 01/21/2019     Priority: Medium     Generalized muscle weakness 01/21/2019     Priority: Medium     Dyspnea on exertion 11/26/2018     Priority: Medium     Sedentary lifestyle 10/22/2018     Priority: Medium     New onset a-fib diagnosed 10/22/18 10/22/2018     Priority: Medium     History  of pulmonary embolism-bilateral on a CT scan from 1/10/18 10/22/2018     Priority: Medium     LAE (left atrial enlargement)-mild 10/22/2018     Priority: Medium     On Coumadin for atrial fibrillation 10/22/2018     Priority: Medium     Chronic diastolic heart failure grade 1 on 1/11/18 10/22/2018     Priority: Medium     Other fatigue 10/22/2018     Priority: Medium     Diastolic dysfunction grade 1 on 1/10/18 10/09/2018     Priority: Medium     Acute respiratory failure with hypoxia (H) 01/12/2018     Priority: Medium     Hypoxia 01/11/2018     Priority: Medium     Lesion of left pinna 09/11/2017     Priority: Medium     Essential hypertension, benign 09/11/2017     Priority: Medium     ACP (advance care planning) 08/16/2016     Priority: Medium     Advance Care Planning 8/16/2016: ACP Review of Chart / Resources Provided:  Reviewed chart for advance care plan.  Josébren VALENZUELA Juan C has no plan or code status on file. Discussed available resources and declined information.Carlie PERLITA Alicea             Facial lesion 08/28/2015     Priority: Medium     Left inguinal hernia 08/28/2015     Priority: Medium        Past Medical History:    Past Medical History:   Diagnosis Date     COPD (chronic obstructive pulmonary disease) (H) 8/7/2019     Diabetes mellitus, type 2 (H) 8/7/2019     Elevated troponin I level 1/11/2018     Other fatigue 10/22/2018       Past Surgical History:    Past Surgical History:   Procedure Laterality Date     GENITOURINARY SURGERY      prostatectomy       Family History:    Family History   Problem Relation Age of Onset     Hypertension Mother      Unknown/Adopted Father      No Known Problems Maternal Grandmother      No Known Problems Maternal Grandfather      No Known Problems Paternal Grandmother      No Known Problems Paternal Grandfather      No Known Problems Sister      No Known Problems Son      No Known Problems Son      Deep Vein Thrombosis (DVT) Son        Social History:  Marital Status:    [2]  Social History     Tobacco Use     Smoking status: Former Smoker     Types: Cigarettes     Start date: 1949     Last attempt to quit: 3/1/1949     Years since quittin.4     Smokeless tobacco: Never Used   Substance Use Topics     Alcohol use: Yes     Alcohol/week: 0.0 oz     Comment: occa     Drug use: No        Medications:      apixaban ANTICOAGULANT (ELIQUIS) 2.5 MG tablet   Cholecalciferol (VITAMIN D-3 PO)   Cyanocobalamin (B-12) 1000 MCG CAPS   lisinopril (PRINIVIL/ZESTRIL) 5 MG tablet   metoprolol succinate (TOPROL-XL) 25 MG 24 hr tablet   order for DME   Pyridoxine HCl (VITAMIN B6 PO)   torsemide (DEMADEX) 10 MG tablet   order for DME         Review of Systems   Constitutional: Negative for activity change, appetite change, diaphoresis, fatigue and fever.   Respiratory: Negative for cough and chest tightness.         Chronic shortness of breath with exertion.   Cardiovascular: Negative for chest pain.   Gastrointestinal: Negative for abdominal pain, diarrhea, nausea and vomiting.   Genitourinary: Negative.    Skin: Negative.    Allergic/Immunologic: Negative for immunocompromised state.   Neurological: Negative for dizziness, weakness and light-headedness.   Hematological: Bruises/bleeds easily.       Physical Exam   BP: (!) 88/60  Heart Rate: 93  Temp: 97.4  F (36.3  C)  Resp: 20  Weight: 74.8 kg (165 lb)  SpO2: 100 %      Physical Exam   Constitutional: He is oriented to person, place, and time. He appears well-developed and well-nourished.   Eyes: Conjunctivae and EOM are normal.   Neck: Normal range of motion. Neck supple.   Cardiovascular:   Irregularly irregular   Pulmonary/Chest: Effort normal.   Mildly diminished bilaterally.   Abdominal: Soft. He exhibits no distension. There is no tenderness.   Musculoskeletal:   Bilateral pitting edema approximately 1-2+   Neurological: He is alert and oriented to person, place, and time.   Skin: Skin is warm and dry. Capillary refill takes  less than 2 seconds.   Psychiatric: He has a normal mood and affect.   Nursing note and vitals reviewed.      ED Course        Procedures  EKG shows atrial fibrillation at a 87.  Normal QRS duration.  Nonspecific ST sagging across the lateral lead.  No concerning T wave abnormalities.  QTc is slightly prolonged at 471 ms.  Appears to be a left axis deviation.        Chest x-ray shows evidence of interstitial congestion with right pleural effusion.    Critical Care time:  none               Results for orders placed or performed during the hospital encounter of 08/13/19 (from the past 24 hour(s))   CBC with platelets differential   Result Value Ref Range    WBC 6.2 4.0 - 11.0 10e9/L    RBC Count 3.03 (L) 4.4 - 5.9 10e12/L    Hemoglobin 7.8 (L) 13.3 - 17.7 g/dL    Hematocrit 25.5 (L) 40.0 - 53.0 %    MCV 84 78 - 100 fl    MCH 25.7 (L) 26.5 - 33.0 pg    MCHC 30.6 (L) 31.5 - 36.5 g/dL    RDW 19.0 (H) 10.0 - 15.0 %    Platelet Count 180 150 - 450 10e9/L    Diff Method Automated Method     % Neutrophils 74.0 %    % Lymphocytes 18.1 %    % Monocytes 6.4 %    % Eosinophils 0.5 %    % Basophils 0.2 %    % Immature Granulocytes 0.8 %    Nucleated RBCs 0 0 /100    Absolute Neutrophil 4.6 1.6 - 8.3 10e9/L    Absolute Lymphocytes 1.1 0.8 - 5.3 10e9/L    Absolute Monocytes 0.4 0.0 - 1.3 10e9/L    Absolute Eosinophils 0.0 0.0 - 0.7 10e9/L    Absolute Basophils 0.0 0.0 - 0.2 10e9/L    Abs Immature Granulocytes 0.1 0 - 0.4 10e9/L    Absolute Nucleated RBC 0.0    Comprehensive metabolic panel   Result Value Ref Range    Sodium 141 133 - 144 mmol/L    Potassium 4.6 3.4 - 5.3 mmol/L    Chloride 109 94 - 109 mmol/L    Carbon Dioxide 24 20 - 32 mmol/L    Anion Gap 8 3 - 14 mmol/L    Glucose 82 70 - 99 mg/dL    Urea Nitrogen 39 (H) 7 - 30 mg/dL    Creatinine 1.53 (H) 0.66 - 1.25 mg/dL    GFR Estimate 40 (L) >60 mL/min/[1.73_m2]    GFR Estimate If Black 47 (L) >60 mL/min/[1.73_m2]    Calcium 7.8 (L) 8.5 - 10.1 mg/dL    Bilirubin Total 0.5  0.2 - 1.3 mg/dL    Albumin 2.3 (L) 3.4 - 5.0 g/dL    Protein Total 5.6 (L) 6.8 - 8.8 g/dL    Alkaline Phosphatase 63 40 - 150 U/L    ALT 37 0 - 70 U/L    AST 21 0 - 45 U/L   Nt probnp inpatient (BNP)   Result Value Ref Range    N-Terminal Pro BNP Inpatient 8,952 (H) 0 - 1,800 pg/mL   Troponin I   Result Value Ref Range    Troponin I ES <0.015 0.000 - 0.045 ug/L   XR Chest Port 1 View    Narrative    PROCEDURE:  XR CHEST PORT 1 VW    HISTORY:  shortness of breath.     COMPARISON:  None.    FINDINGS:   Heart is enlarged there is a right-sided pleural effusion. There is  some questionable thickening in the major fissure on the left. There  is some linear atelectasis or scarring seen at both lung bases. severe  arthritic changes are noted at the glenohumeral articulations  bilaterally.      Impression    IMPRESSION:  Cardiomegaly and right-sided pleural effusion.  Questionable fluid in the major fissure on the left.      CLAUDIA MONTEJO MD       Medications - No data to display    Assessments & Plan (with Medical Decision Making)   Hemoglobin is dropped approximately over 1 g in 4 days.  Remains relatively asymptomatic other than weakness at rest.  Multiple concerns including worsening congestive heart failure, hypertension, worsening anemia, and recent GI bleeding would argue for any reasonable admission of the hospital.  Gentle fluid administration here in the emergency department.  Graciously accepted by Dr. Brown.    This document was prepared using a combination of typing and voice generated software.  While every attempt was made for accuracy, spelling and grammatical errors may exist.    I have reviewed the nursing notes.    I have reviewed the findings, diagnosis, plan and need for follow up with the patient.       New Prescriptions    No medications on file       Final diagnoses:   Dyspnea on exertion   CHF (congestive heart failure) (H)   Hypotension, unspecified hypotension type   CKD (chronic kidney  disease) stage 3, GFR 30-59 ml/min (H)   Anemia   Long term current use of anticoagulant therapy   History of GI bleed       8/13/2019   [facility]     Elisa Atkinson PA-C  08/13/19 [id number]

## 2019-08-13 NOTE — PROGRESS NOTES
Emergency Department Assessment  Information obtained from chart review and wife, Queta as Oiva unable to stay awake.    PCP: JABIER Kent  Special[facility] providers[facility]   [facility]   Medication routine/concerns: wife manages   Cognitive Behavioral Status: awake and alert    Affect and Mood Status: sleepy     Mental Health History: not addressed   Recent Stressors: weakness at home     Case Manger/: not connected     Support System: limited to wife, Queta and [facility] home care staff  Transportation: wifeQueta     Current Living Situation:    Home Setting: Multi-level   Living Situation:Spouse   Function Status/Assistive Device: wheeled walker    Employment/Financial/Insurance Concerns:retired     Insurance Plan     [place] [facility]: yes; sees yearly for physical   Health Care Directive: Queta believes one is completed   Previous Services at Home or in the Community: [facility]- nursing once a week, PT and OT twice a week, and a home health aide once a week   Falls at home?: denies     ADL's:independent   Equipment at home?: four wheeled walker   O2: no   Smoker: not addressed   ETOH: not addressed   THC/Drugs: not addressed     Any Violence in the home?: denies   Do you feel safe at home?: Queta believes yes      Plan: possible admission to medical floor  [facility] Santana at [facility]

## 2019-08-14 NOTE — PROGRESS NOTES
"Inpatient Occupational Therapy Evaluation    Name: Mary Irizarry MRN# [id number]   Age: 86 year old YOB: 1932     Date of Consultation: 2019  Consultation is requested by: Dr. Brown  Specific Consultation Request: Weakness  Primary care provider: JABIER Kent    General Information:   Onset Date: 19    History of Current Problem/Admission: CHF (congestive heart failure) (H) [I50.9]  Dyspnea on exertion [R06.09]  Anemia [D64.9]  CKD (chronic kidney disease) stage 3, GFR 30-59 ml/min (H) [N18.3]  History of GI bleed [Z87.19]  Long term current use of anticoagulant therapy [Z79.01]  Hypotension, unspecified hypotension type [I95.9]    Prior Level of Function: Pt previously used a walker for functional mobility and received assistance with dressing tasks and sponge bathing. Pt mainly was independent with toileting.   Ambulation: 1 - Assistive Equipment   Transferrin - Assistive Equipment   Toiletin - Assistive Equipment    Bathin - Assistive Person (sponge baths)  Dressin - Assistive Person   Eatin - Independent   Communication: 2 - difficulty with understanding (not related to language barrier)  Swallowin - swallows foods/liquids without difficulty  Cognition: 1 - attention or memory deficits    Fall history within the last 6 months: No    Current Living Situation: Pt lives with his wife in a farm home with 140 acres. \"A few\" steps onto the deck. Main level living once inside. The shower is in the basement, therefore, pt completes sponge bathes. Bathroom has a raised toilet seat with armrests.     Current Equipment Used at Home: Raised toilet seat with armrests, walker      Patient & Family Goals: Return home      Past Medical History:   Past Medical History:   Diagnosis Date     COPD (chronic obstructive pulmonary disease) (H) 2019     Diabetes mellitus, type 2 (H) 2019     Elevated troponin I level 2018     Other fatigue 10/22/2018       Past " Surgical History:  Past Surgical History:   Procedure Laterality Date     GENITOURINARY SURGERY      prostatectomy       Medications:   Current Facility-Administered Medications   Medication     acetaminophen (TYLENOL) tablet 650 mg     apixaban ANTICOAGULANT (ELIQUIS) tablet 2.5 mg     ferrous sulfate (FEROSUL) tablet 325 mg     furosemide (LASIX) 100 mg in sodium chloride 0.9 % 100 mL infusion     lidocaine (LMX4) kit     lidocaine 1 % 0.1-1 mL     melatonin tablet 1 mg     naloxone (NARCAN) injection 0.1-0.4 mg     ondansetron (ZOFRAN-ODT) ODT tab 4 mg    Or     ondansetron (ZOFRAN) injection 4 mg     Patient is already receiving anticoagulation with heparin, enoxaparin (LOVENOX), warfarin (COUMADIN)  or other anticoagulant medication     sodium chloride (PF) 0.9% PF flush 3 mL     sodium chloride (PF) 0.9% PF flush 3 mL       Weight Bearing Status: no restrictions      Cognitive Status Examination:  Orientation: oriented to time, place and person  Level of Consciousness: lethargic  Follows Commands and Answers Questions: 75% of the time  Personal Safety and Judgement: Intact  Memory: Immediate recall impaired, Short-term memory impaired and Long-term memory impaired  Comments:     Pain:   Currently in pain? No  Pain Location? Wife reports pt occasionally has pain in his shoulders from arthritis     Occupational Therapy Evaluation:   Integumentary/Edema: no issues observed    Range of Motion: Functional    Strength: BUE's grossly 3-3+/5  Hand Strength: fair bilaterally   Muscle Tone Assessment: no deficits   Coordination: impaired     Mobility:   Transfer Skills: pt transferred with Min-modA sit>stand from the chair but the chair was low. CGA for stand>sit and verbal cues for proper hand placement   Bed to Chair/Chair to Bed: NT as pt in the chair upon OT arrival   Tub/Shower: NT  Toilet Transfer: CGA with verbal cues for proper hand placement (set up at home is better)   Balance: fair-good with use of walker       ADLs:  Lower Body Dressing: Not assessed as spouse completes majority of task at home  Toileting: did not have to go with OT  Grooming: SBA standing at the sink    IADLs:   Previous Responsibilities of the Patient: Wife completes all IADLs  Comments:      Activities of Daily Living Analysis:   Impairments Contributing to Impaired Activities of Daily Living: Cognition impaired , coordination impaired , ROM decreased , strength decreased and activity tolerance decrased  Comments:     Occupational Therapy Interventions: ADL Retraining , bed mobility, cognition , ROM , strengthening , transfer training and progressive activity/exercise    Clinical Impressions:  Criteria for Skilled Therapeutic Intervention Met: Yes, treatment indicated  OT Diagnosis: Impaired ADLs  Influenced by the following impairments: impaired cognition, coordination and activity tolerance, weakness, decreased ROM  Functional limitations due to impairment: Dressing, bathing, toileting, functional mobility   Clinical presentation: Stable/Uncomplicated  Clinical presentation rationale: clinical judgement   Clinical Decision making (complexity): Moderate Complexity  Frequency: 5 times/week  Predicted Duration of Therapy Intervention (days/wks): 5 days  Anticipated Discharge Disposition: Home with Assist and Home with Home Therapy  Anticipated Equipment Needs at Discharge:   Risks and Benefits of therapy have been explained: Yes  Patient, Family & other staff in agreement with plan of care: Yes  Clinical Impression Comments: OT evaluation ordered for LE weakness. Pt required CGA for ADLs and functional mobility and verbal cues for proper hand placement. He did have difficulties transferring sit>stand from the low chair. Spouse reports pt is doing much better than yesterday and appears to be near his baseline. She did cue and assist pt today as needed. Wife feels she can manage caring for pt at home with current deficits. Recommend ongoing skilled  home therapy upon discharge home with spouse.     Total Eval Time: 13

## 2019-08-14 NOTE — DISCHARGE SUMMARY
[facility]    Discharge Summary  Hospitalist    Date of Admission:  8/13/2019  Date of Discharge:  8/14/2019  Discharging Provider: Selena Brown MD  Date of Service (when I saw the patient): 08/14/19    Discharge Diagnoses   Active Problems:    Leg weakness (8/13/2019)    Heart failure with preserved ejection fraction    Anemia, hemoglobin 7.9 on discharge, stable from previous    Recent GI bleeding    Chronic kidney disease stage III -stable    Non-insulin-dependent diabetes mellitus type 2    COPD    Cognitive impairment    History of Present Illness   Mary Irizarry is an 86 year old male who presented with lower extremity weakness.  Please see admission H+P for additional details.    Hospital Course   Mary Irizarry is an 86-year-old male with history of paroxysmal atrial ablation on anticoagulation, history of DVT/PE, history of grade 1 diastolic dysfunction who was admitted on 8/13/2019 for lower extremity weakness.  He does not appear to have anything acutely causing it, but rather a multifactorial etiology including heart failure, anemia from his recent GI bleed, and possible right-sided effusion.  He does have a right pleural effusion that appears mild to moderate.  However he is not in respiratory failure, not requiring supplemental oxygen, no increased work of breathing, no subjective shortness of breath.  Due to the finding of a right effusion, we did place him on some Lasix while he was here with good response.  He was found to be anemic with a hemoglobin of 7.8, and this was stable despite being on Xarelto and his recent GI bleed..  His repeat level the next morning was 7.9.  He was evaluated by physical and occupational therapy and was found to be at approximately his baseline as far as his functional status.  The patient is otherwise asymptomatic except for weakness.  He is somewhat borderline as far as a safe home discharge, however wife is fairly confident that she can meet his needs at  home.  We will give him a referral to home care nursing as well as PT and OT.  He is otherwise stable to be discharged home at this time with his wife.    Selena Brown MD      Significant Results and Procedures   See above.    Pending Results   These results will be followed up by JABIER Kent    Unresulted Labs Ordered in the Past 30 Days of this Admission     No orders found for last 31 day(s).          Code Status   Full Code       Primary Care Physician   JABIER Kent    Physical Exam   Temp: 97.9  F (36.6  C) Temp src: Tympanic BP: 106/59 Pulse: 88 Heart Rate: 89 Resp: 16 SpO2: 94 % O2 Device: None (Room air)    Vitals:    08/13/19 1303 08/13/19 1615 08/14/19 0542   Weight: 74.8 kg (165 lb) 70.3 kg (155 lb) 70.4 kg (155 lb 3.3 oz)     Vital Signs with Ranges  Temp:  [97.5  F (36.4  C)-98.2  F (36.8  C)] 97.9  F (36.6  C)  Pulse:  [88] 88  Heart Rate:  [] 89  Resp:  [12-18] 16  BP: ()/(57-75) 106/59  SpO2:  [94 %-100 %] 94 %  I/O last 3 completed shifts:  In: 1151 [P.O.:140; I.V.:1011]  Out: 1505 [Urine:1505]    Constitutional: AA, NAD, weak, frail  Eyes: PERRLA, no injection, no icterus  HEENT: atraumatic, normocephalic  Respiratory: CTA b/l  Cardiovascular: S1 S2 RRR  GI: soft, NT, ND, + bowel sounds  Lymph/Hematologic: no palpable lymphadenopathy  Skin: no rashes, no lesions  Musculoskeletal: trace edema has improved from admission, good tone, no deformities  Neurologic: oriented x 3, no focal deficits  Psychiatric: appropriate affect    Discharge Disposition   Discharged to home  Condition at discharge: Stable    Consultations This Hospital Stay   PHYSICAL THERAPY ADULT IP CONSULT  OCCUPATIONAL THERAPY ADULT IP CONSULT  PHYSICAL THERAPY ADULT IP CONSULT  OCCUPATIONAL THERAPY ADULT IP CONSULT    Time Spent on this Encounter   ISelena MD, personally saw the patient today and spent greater than 30 minutes discharging this patient.    Discharge Orders      Home care nursing referral       Home Care PT Referral for Hospital Discharge      Home Care OT Referral for Hospital Discharge      Reason for your hospital stay    LE weakness     Follow-up and recommended labs and tests     Follow up with primary care provider, JABIER Kent, within 7 days for hospital follow- up.  The following labs/tests are recommended: bmp, hgb.     Activity    Your activity upon discharge: activity as tolerated     MD face to face encounter    Documentation of Face to Face and Certification for Home Health Services    I certify that patient: Mary Irizarry is under my care and that I, or a nurse practitioner or physician's assistant working with me, had a face-to-face encounter that meets the physician face-to-face encounter requirements with this patient on: August 14, 2019.    This encounter with the patient was in whole, or in part, for the following medical condition, which is the primary reason for home health care: generalized weakness.    I certify that, based on my findings, the following services are medically necessary home health services: Nursing, Occupational Therapy and Physical Therapy.    My clinical findings support the need for the above services because: Nurse is needed: To assess overall status after changes in medications or other medical regimen.., Occupational Therapy Services are needed to assess and treat cognitive ability and address ADL safety due to impairment in weakness. and Physical Therapy Services are needed to assess and treat the following functional impairments: weakness.    Further, I certify that my clinical findings support that this patient is homebound (i.e. absences from home require considerable and taxing effort and are for medical reasons or [facility] services or infrequently or of short duration when for other reasons) because: Requires assistance of another person or specialized equipment to access medical services because patient: Is unable to exit home safely on own due to:  weakness.    Based on the above findings. I certify that this patient is confined to the home and needs intermittent skilled nursing care, physical therapy and/or speech therapy.  The patient is under my care, and I have initiated the establishment of the plan of care.  This patient will be followed by a physician who will periodically review the plan of care.  Physician/Provider to provide follow up care: JABIER Kent    Attending hospital physician (the Medicare certified [place] provider): Selena Brown MD  Physician Signature: See electronic signature associated with these discharge orders.  Date: 8/14/2019     Full Code     Diet    Follow this diet upon discharge: Orders Placed This Encounter      Combination Diet Regular Diet Adult     Discharge Medications   Current Discharge Medication List      START taking these medications    Details   ferrous sulfate (FEROSUL) 325 (65 Fe) MG tablet Take 1 tablet (325 mg) by mouth daily  Qty: 30 tablet, Refills: 0    Associated Diagnoses: Weakness of both lower extremities         CONTINUE these medications which have NOT CHANGED    Details   apixaban ANTICOAGULANT (ELIQUIS) 2.5 MG tablet Once INR <2, then start taking 2.5 mg PO BID  Qty: 60 tablet, Refills: 0    Associated Diagnoses: History of pulmonary embolism      Cholecalciferol (VITAMIN D-3 PO) Take 50 mcg by mouth daily      Cyanocobalamin (B-12) 1000 MCG CAPS Take 1,000 mcg by mouth daily  Qty: 90 capsule, Refills: 3    Associated Diagnoses: Primary hypercoagulable state (H)      lisinopril (PRINIVIL/ZESTRIL) 5 MG tablet Take 1 tablet (5 mg) by mouth daily  Qty: 90 tablet, Refills: 3    Associated Diagnoses: Diastolic dysfunction      metoprolol succinate (TOPROL-XL) 25 MG 24 hr tablet Take 1 tablet (25 mg) by mouth daily  Qty: 93 tablet, Refills: 3    Associated Diagnoses: Diastolic dysfunction; Essential hypertension, benign; New onset a-fib (H)      !! order for DME Equipment being ordered: Walker 4 wheeled  with Seat  Qty: 1 Units, Refills: 0    Associated Diagnoses: Generalized muscle weakness; Sedentary lifestyle; Physical deconditioning      Pyridoxine HCl (VITAMIN B6 PO) Take 100 mg by mouth daily      torsemide (DEMADEX) 10 MG tablet Take 1 tablet (10 mg) by mouth daily  Qty: 90 tablet, Refills: 3    Associated Diagnoses: Dyspnea on exertion; Diastolic dysfunction; Chronic diastolic heart failure (H); CKD (chronic kidney disease) stage 3, GFR 30-59 ml/min (H)      !! order for DME Equipment being ordered: Wheelchair  Fax to [facility]  Qty: 1 Device, Refills: 0    Associated Diagnoses: Generalized muscle weakness; Dyspnea on exertion       !! - Potential duplicate medications found. Please discuss with provider.        Allergies   No Known Allergies  Data   Most Recent 3 CBC's:  Recent Labs   Lab Test 08/14/19  0505 08/13/19  1259 08/09/19  1203 08/09/19  0542   WBC 5.6 6.2  --  5.8   HGB 7.9* 7.8* 8.9* 9.0*   MCV 82 84  --  84    180  --  175      Most Recent 3 BMP's:  Recent Labs   Lab Test 08/14/19  0505 08/13/19  1259 08/09/19  0542    141 142   POTASSIUM 3.7 4.6 4.2   CHLORIDE 108 109 113*   CO2 29 24 26   BUN 39* 39* 39*   CR 1.32* 1.53* 1.31*   ANIONGAP 4 8 3   RADHA 7.9* 7.8* 8.3*   GLC 72 82 87     Most Recent 2 LFT's:  Recent Labs   Lab Test 08/13/19  1259 08/07/19  2104   AST 21 28   ALT 37 36   ALKPHOS 63 62   BILITOTAL 0.5 0.4     Most Recent INR's and Anticoagulation Dosing History:  Anticoagulation Dose History     Recent Dosing and Labs Latest Ref Rng & Units 7/19/2019 7/30/2019 8/6/2019 8/7/2019 8/8/2019 8/9/2019 8/12/2019    INR 0.80 - 1.20 - - 6.83(HH) 6.68(HH) 6.11(HH) 4.19(H) 1.76(H)    INR 0.86 - 1.14 1.7(A) 4.5(A) - - - - -        Most Recent 3 Troponin's:  Recent Labs   Lab Test 08/13/19  1259 01/14/19  1252 01/14/19  0945   TROPI <0.015 0.052* 0.055*     Most Recent Cholesterol Panel:  Recent Labs   Lab Test 02/12/18   CHOL 180      HDL 65   TRIG 63     Most Recent 6  Bacteria Isolates From Any Culture (See EPIC Reports for Culture Details):  Recent Labs   Lab Test 08/08/19  0205 01/11/18  0215   CULT No MRSA isolated No MRSA isolated     Most Recent TSH, T4 and A1c Labs:  Recent Labs   Lab Test 07/22/19  1006 01/21/19  0956   TSH 1.14  --    A1C  --  5.5     Results for orders placed or performed during the hospital encounter of 08/13/19   XR Chest Port 1 View    Narrative    PROCEDURE:  XR CHEST PORT 1 VW    HISTORY:  shortness of breath.     COMPARISON:  None.    FINDINGS:   Heart is enlarged there is a right-sided pleural effusion. There is  some questionable thickening in the major fissure on the left. There  is some linear atelectasis or scarring seen at both lung bases. severe  arthritic changes are noted at the glenohumeral articulations  bilaterally.      Impression    IMPRESSION:  Cardiomegaly and right-sided pleural effusion.  Questionable fluid in the major fissure on the left.      CLAUDIA MONTEJO MD

## 2019-08-14 NOTE — PROGRESS NOTES
Inpatient Physical Therapy Evaluation    Name: Mary Irizarry MRN# [id number]   Age: 86 year old YOB: 1932     Date of Consultation: 2019  Consultation is requested by:  Dr. Brown  Specific Consultation Request:  OFELIA neal  Primary care provider: JABIER Kent    General Information:   Onset Date: 19    History of Current Problem/Admission: CHF (congestive heart failure) (H) [I50.9]  Dyspnea on exertion [R06.09]  Anemia [D64.9]  CKD (chronic kidney disease) stage 3, GFR 30-59 ml/min (H) [N18.3]  History of GI bleed [Z87.19]  Long term current use of anticoagulant therapy [Z79.01]  Hypotension, unspecified hypotension type [I95.9]    Prior Level of Function: Pt is from home with his wife. He has been getting home PT, OT, and nursing. Pt ambulates with a FWW, sometimes needs assistance from his wife for transfers. Wife is always with him and assists him as needed.   Ambulation: 1 - Assistive Equipment   Transferrin - Assistive Equipment   Toiletin - Not assessed   Bathin - Not assessed  Dressin - Not assessed  Eatin - Independent   Communication: 0 - Understands/communicates without difficulty  Swallowin - swallows foods/liquids without difficulty  Cognition: 0 - no cognitive issues reported    Fall history within the last 6 months: No    Current Living Situation: Patient lives at home with his wife in a single story home with 1 step to enter    Current Equipment Used at Home: FWW     Patient & Family Goals: Return home     Past Medical History:   Past Medical History:   Diagnosis Date     COPD (chronic obstructive pulmonary disease) (H) 2019     Diabetes mellitus, type 2 (H) 2019     Elevated troponin I level 2018     Other fatigue 10/22/2018       Past Surgical History:  Past Surgical History:   Procedure Laterality Date     GENITOURINARY SURGERY      prostatectomy       Medications:   Current Facility-Administered Medications   Medication      acetaminophen (TYLENOL) tablet 650 mg     apixaban ANTICOAGULANT (ELIQUIS) tablet 2.5 mg     ferrous sulfate (FEROSUL) tablet 325 mg     furosemide (LASIX) 100 mg in sodium chloride 0.9 % 100 mL infusion     lidocaine (LMX4) kit     lidocaine 1 % 0.1-1 mL     melatonin tablet 1 mg     naloxone (NARCAN) injection 0.1-0.4 mg     ondansetron (ZOFRAN-ODT) ODT tab 4 mg    Or     ondansetron (ZOFRAN) injection 4 mg     Patient is already receiving anticoagulation with heparin, enoxaparin (LOVENOX), warfarin (COUMADIN)  or other anticoagulant medication     sodium chloride (PF) 0.9% PF flush 3 mL     sodium chloride (PF) 0.9% PF flush 3 mL       Weight Bearing Status: WBAT      Cognitive Status Examination:  Orientation: awake and alert  Level of Consciousness: alert  Follows Commands and Answers Questions: 100% of the time  Personal Safety and Judgement: At Risk Behaviors Demonstrated  Memory: Immediate recall intact  Comments:     Pain:   Currently in pain? No  Pain Location?   Pain Rating:     Physical Therapy Evaluation:   Integumentary/Edema: No issues observed  ROM: WFL  Strength: BLE weakness demonstrated. Grossly 3+ to 4-/5  Bed Mobility: Min A  Transfers: CGA to min A using FWW  Gait: Pt ambulated about 100ft with FWW and CGA. Slow radha but pt steady and safe with walker. Distance limited by fatigue  Balance: Fair with walker  Sensory: No issues observed  Coordination: No issues observed    Physical Therapy Interventions: Eval only     Clinical Impressions:  Criteria for Skilled Therapeutic Intervention Met: Evaluation Only  PT Diagnosis: Weakness, decreased activity tolerance  Influenced by the following impairments: LE weakness, decreased activity tolerance   Functional limitations due to impairment:  Decreased tolerance for functional mobility tasks   Clinical presentation: Stable/Uncomplicated  Clinical presentation rationale: Clinical judgement  Clinical Decision making (complexity): Low  Complexity  Frequency: Today  Predicted Duration of Therapy Intervention (days/wks): Today    Anticipated Discharge Disposition: Home with Home Therapy  Anticipated Equipment Needs at Discharge: NA  Risks and Benefits of therapy have been explained: Yes  Patient, Family & other staff in agreement with plan of care: Yes  Clinical Impression Comments: Pt evaluation ordered for LE weakness. Pt currently functioning near baseline according to pt and wife. Only required CGA to min A which provides him with at home as needed. Pt plans to continue with home PT and OT. Discussed short term rehab with pt and wife and they are not interested at this time.     Total Eval Time:15

## 2019-08-14 NOTE — PLAN OF CARE
Temp: 97.5  F (36.4  C) Temp src: Tympanic BP: 116/61 Pulse: 88 Heart Rate: 98 Resp: 15 SpO2: 94 % O2 Device: None (Room air)      Disoriented to place, time, and situation.  LS fine crackles to bilat bases and exp wheezes in  upper lobes. BS active. Furosemide drip at 2ml/hr. Has been incontinent of urine and at times calls before; bladder scanned 346ml at 0255, straight cath'd 300ml. Per ICU tele report afib 70's-120's. Turn & repo Q2.  Alarms on, call lt within reach.      Face to face report given with opportunity to observe patient.    Report given to Pattie Bautista   8/14/2019  5:14 AM

## 2019-08-14 NOTE — PLAN OF CARE
Discharge Planner PT   Patient plan for discharge: Home with wife and continue home PT  Current status: Bed Mobility: Min A  Transfers: CGA to min A using FWW  Gait: Pt ambulated about 100ft with FWW and CGA. Slow radha but pt steady and safe with walker. Distance limited by fatigue  Barriers to return to prior living situation: None, pt functioning near baseline  Recommendations for discharge: Home with continued home PT, OT and nursing  Rationale for recommendations: Pt evaluation ordered for LE weakness. Pt currently functioning near baseline according to pt and wife. Only required CGA to min A which provides him with at home as needed. Pt plans to continue with home PT and OT. Discussed short term rehab with pt and wife and they are not interested at this time.          Entered by: Megan Prado 08/14/2019 12:42 PM

## 2019-08-14 NOTE — PROGRESS NOTES
Face to face report given with opportunity to observe patient.    Report given to GUSTAVO- DONTE Georges   8/13/2019  7:23 PM

## 2019-08-14 NOTE — PLAN OF CARE
Discharge Planner OT   Patient plan for discharge: Home with assist and ongoing home therapy   Current status: CGA for ADLs and functional mobility with verbal cues for proper hand placement. Pt required more assistance (modA) transferring out of low hospital chair).   Barriers to return to prior living situation: none from an OT perspective as pt appears near his baseline in ADLs  Recommendations for discharge: Home with Assist and Home Occupational Therapy   Rationale for recommendations: OT evaluation ordered for LE weakness. Pt required CGA for ADLs and functional mobility and verbal cues for proper hand placement. He did have difficulties transferring sit>stand from the low chair. Spouse reports pt is doing much better than yesterday and appears to be near his baseline. She did cue and assist pt today as needed. Wife feels she can manage caring for pt at home with current deficits. Recommend ongoing skilled home therapy upon discharge home with spouse.        Entered by: Roz Geiger 08/14/2019 1:03 PM

## 2019-08-14 NOTE — PLAN OF CARE
Face to face report given with opportunity to observe patient.    Report given to DONTE Pendleton   8/14/2019  7:35 AM

## 2019-08-14 NOTE — PLAN OF CARE
Patient discharged at 3:12 PM via wheel chair accompanied by spouse and staff. Prescriptions sent to patients preferred pharmacy. All belongings sent with patient.     Discharge instructions reviewed with patient and spouse. Listed belongings gathered and returned to patient. yes    Patient discharged to home.   Report called to N/A    Core Measures and Vaccines  Core Measures applicable during stay: N/A If yes, state diagnosis: N/A  Pneumonia and Influenza given prior to discharge, if indicated: N/A    Surgical Patient   Surgical Procedures during stay: N/A  Did patient receive discharge instruction on wound care and recognition of infection symptoms? N/A    MISC  Follow up appointment made:  Yes  Home and hospital aquired medications returned to patient: N/A  Patient reports pain was well managed at discharge: Yes

## 2019-08-14 NOTE — PLAN OF CARE
Patient is alert and oriented, has denied pain thought the shift, the lasix drip is infusing, and patient has been voiding frequently, patient does have fine crackles noted to the lungs bilaterally, patients wife has been here thought the shift, per ICU, tely reading is Afib with PAC's in the 80's. Patient and wife do anticipate discharge today.

## 2019-08-14 NOTE — PROGRESS NOTES
Assessment completed see flowsheet.    LOC: sleeping  Others present: Patient and Family and wife Queta    Dx: Heart Failure, Lower extremity weakness  Chronic Disease Management: HF, anemia, CKD    Lives with: wife Queta  Living at:  [place]  Transportation: YES, Queta drives    Primary PCP: JABIER Kent  Insurance:  Medicare/[employer]  Medicare JAMES letter reviewed with Queta.    Support System:  Family and friends  Homecare/PCA: [facility], Skilled RN, OT/PT  /[facility]:   no  : YES      [facility] Referral [facility] called: yes    Health Care Directive: NO, wife states filled out POLST with [facility]  Guardian: no  POA: no    [facility]  Meds management: YES, Queta manages    Adequate Resources for needs (housing, utilities, food/med): YES  Household chores: Queta  Work/community/social activity: YES, gets out 2X monthly    ADLs: needs assistance with all but feeding  Ambulation:uses 4 wheel walker  Falls: no  Nutrition: no concern  Sleep: sleeps well    Equipment used: walker      Oxygen supplier: n/a      Does the supplier have valid oxygen orders: n/a    Mental health: no  Substance abuse: no  Exposure to violence/abuse: no  Stressors: denies    Able to Return to Prior Living Arrangements: YES    Choice of Vendor: n/a    Barriers: none    HEATHER: average    Plan: home with home health reinstated.    Mary was discharged today.        Name: Mary Irizarry    MRN#: [id number]    Reason for Hospitalization: CHF (congestive heart failure) (H) [I50.9]  Dyspnea on exertion [R06.09]  Anemia [D64.9]  CKD (chronic kidney disease) stage 3, GFR 30-59 ml/min (H) [N18.3]  History of GI bleed [Z87.19]  Long term current use of anticoagulant therapy [Z79.01]  Hypotension, unspecified hypotension type [I95.9]    Discharge Date: 8/14/2019    Patient / Family response to discharge plan: in agreement, refused SNF    Follow-Up Appt:   Future Appointments   Date Time Provider Department  [place]   8/20/2019 11:30 AM JABIER Kent MD [facility]   11/18/2019  1:00 PM Preston Bryant, DO [facility]       Other Providers (Care Coordinator, County Services, PCA services etc): Yes: [facility]    Discharge Disposition: home    Padma Jc

## 2019-08-15 NOTE — TELEPHONE ENCOUNTER
Mary Irizarry, was discharged to home on 8/14/19  from [facility]. I spoke today with his wife Queta  regarding his  discharge.   She indicates they have receive(d) sufficient information upon discharge. Medications were reviewed in full on discharge, including: Medications to be started; medications to be continued from preadmission and any side effects. Prescriptions were received at discharge and were able to be filled. Medications are being taken as prescribed.   She indicates they have an appointment scheduled for 8/20/19  and have transportation available. She was  able to confirm her  appointment time and has  it written down.   She did not have any questions regarding discharge instructions or condition.  Per their request, the following employee (s) can be recognized for their outstanding services delivered:  Care was very good.   Suggestions to improve service: Nothing indicated.   She was informed she  may receive a survey in the mail from the hospital. Asked if she  would kindly complete the survey in order for [facility] to know if services fully met patient needs.

## 2019-08-15 NOTE — PLAN OF CARE
Occupational Therapy Discharge Summary    Reason for therapy discharge:    Discharged to home with home therapy.    Progress towards therapy goal(s). [facility] for goal details.  Goals not met.  Barriers to achieving goals:   discharge on same date as initial evaluation.    Therapy recommendation(s):    Continued therapy is recommended.  Rationale/Recommendations:  Recommend continued home therapy to improve pt's strength and endurance for maximal independence in ADLs.

## 2019-08-15 NOTE — TELEPHONE ENCOUNTER
Patient discharged from hospital 8/13/2019  [facility] looking for clarification on two medications     Metoprolol and Cardura     Prior to hospitalization patient was taking   Metoprolol 12.5 mg daily due to low b/p   Cardura 4 mg     Patient hospital discharge papers 8/13/2019 state Metoprolol 25 mg and Cardura is not on active medication list.      Looks like medications were adjusted by Dr. Bryant however patient states he was not advised of any medication changes except for the addition of IRON.    Please advise

## 2019-08-15 NOTE — PROGRESS NOTES
Received call from Bela at the [facility], discharge summary will need to be faxed to the [facility] Care Department at [phone].     Refaxed discharge orders and the discharge summary to the above number.

## 2019-08-15 NOTE — PROGRESS NOTES
Subjective     Mary Irizarry is a 86 year old male who presents to clinic today for the following health issues:    HPI       Hospital Follow-up Visit:    Hospital/Nursing Home/IP Rehab Facility: [facility]  Date of Admission: 8/13/19  Date of Discharge: 8/14/19  Reason(s) for Admission: leg weakness,HF, Anemia, GI bleed,cognitive impairment, and copd.     Hospital Course     Mary Iriazrry is an 86-year-old male with history of paroxysmal atrial ablation on anticoagulation, history of DVT/PE, history of grade 1 diastolic dysfunction who was admitted on 8/13/2019 for lower extremity weakness.  He does not appear to have anything acutely causing it, but rather a multifactorial etiology including heart failure, anemia from his recent GI bleed, and possible right-sided effusion.  He does have a right pleural effusion that appears mild to moderate.  However he is not in respiratory failure, not requiring supplemental oxygen, no increased work of breathing, no subjective shortness of breath.  Due to the finding of a right effusion, we did place him on some Lasix while he was here with good response.  He was found to be anemic with a hemoglobin of 7.8, and this was stable despite being on Xarelto and his recent GI bleed..  His repeat level the next morning was 7.9.  He was evaluated by physical and occupational therapy and was found to be at approximately his baseline as far as his functional status.  The patient is otherwise asymptomatic except for weakness.  He is somewhat borderline as far as a safe home discharge, however wife is fairly confident that she can meet his needs at home.  We will give him a referral to home care nursing as well as PT and OT.  He is otherwise stable to be discharged home at this time with his wife.       Problems taking medications regularly:  None       Medication changes since discharge: Cardura/ PCP advised to keep taking it.        Problems adhering to non-medication  therapy:  None    Summary of hospitalization:  [facility] discharge summary reviewed  Diagnostic Tests/Treatments reviewed.  Follow up needed: none  Other Healthcare Providers Involved in Patient s Care:         None  Update since discharge: improved.     Post Discharge Medication Reconciliation: discharge medications reconciled, continue medications without change.  Plan of care communicated with patient and family     Coding guidelines for this visit:  Type of Medical   Decision Making Face-to-Face Visit       within 7 Days of discharge Face-to-Face Visit        within 14 days of discharge   Moderate Complexity [id number]   High Complexity [id number]            Musculoskeletal problem/pain      Duration: years     Description  Location: right knee     Intensity:  moderate, severe    Accompanying signs and symptoms: swelling    History  Previous similar problem: YES  Previous evaluation:  none    Precipitating or alleviating factors:  Trauma or overuse: YES- had a blood clot years ago   Aggravating factors include: standing, walking and climbing stairs    Therapies tried and outcome: nothing      Patient Active Problem List   Diagnosis     Facial lesion     Left inguinal hernia     ACP (advance care planning)     Lesion of left pinna     Essential hypertension, benign     Hypoxia     Acute respiratory failure with hypoxia (H)     Diastolic dysfunction grade 1 on 1/10/18     Sedentary lifestyle     New onset a-fib diagnosed 10/22/18     History of pulmonary embolism-bilateral on a CT scan from 1/10/18     LAE (left atrial enlargement)-mild     On Coumadin for atrial fibrillation     Chronic diastolic heart failure grade 1 on 1/11/18     Other fatigue     Dyspnea on exertion     Paroxysmal atrial fibrillation (H)     Lower extremity edema     Generalized muscle weakness     CKD (chronic kidney disease) stage 3, GFR 30-59 ml/min (H)     Weakness of both lower extremities     Physical deconditioning     COPD  (chronic obstructive pulmonary disease) (H)     Diabetes mellitus, type 2 (H)     GI bleed     Acute GI bleeding     Leg weakness     Past Surgical History:   Procedure Laterality Date     GENITOURINARY SURGERY      prostatectomy       Social History     Tobacco Use     Smoking status: Former Smoker     Types: Cigarettes     Start date: 1949     Last attempt to quit: 3/1/1949     Years since quittin.5     Smokeless tobacco: Never Used   Substance Use Topics     Alcohol use: Yes     Alcohol/week: 0.0 oz     Comment: occa     Family History   Problem Relation Age of Onset     Hypertension Mother      Unknown/Adopted Father      No Known Problems Maternal Grandmother      No Known Problems Maternal Grandfather      No Known Problems Paternal Grandmother      No Known Problems Paternal Grandfather      No Known Problems Sister      No Known Problems Son      No Known Problems Son      Deep Vein Thrombosis (DVT) Son          Current Outpatient Medications   Medication Sig Dispense Refill     apixaban ANTICOAGULANT (ELIQUIS) 2.5 MG tablet Once INR <2, then start taking 2.5 mg PO BID 60 tablet 0     Cholecalciferol (VITAMIN D-3 PO) Take 50 mcg by mouth daily       Cyanocobalamin (B-12) 1000 MCG CAPS Take 1,000 mcg by mouth daily 90 capsule 3     doxazosin (CARDURA) 4 MG tablet Take 4 mg by mouth At Bedtime       ferrous sulfate (FEROSUL) 325 (65 Fe) MG tablet Take 1 tablet (325 mg) by mouth daily 30 tablet 0     lisinopril (PRINIVIL/ZESTRIL) 5 MG tablet Take 1 tablet (5 mg) by mouth daily 90 tablet 3     metoprolol succinate (TOPROL-XL) 25 MG 24 hr tablet Take 1 tablet (25 mg) by mouth daily 93 tablet 3     order for DME Equipment being ordered: Walker 4 wheeled with Seat 1 Units 0     order for DME Equipment being ordered: Wheelchair  Fax to [facility] 1 Device 0     Pyridoxine HCl (VITAMIN B6 PO) Take 100 mg by mouth daily       torsemide (DEMADEX) 10 MG tablet Take 1 tablet (10 mg) by mouth daily 90 tablet 3      No Known Allergies  BP Readings from Last 3 Encounters:   08/20/19 98/58   08/14/19 106/59   08/09/19 113/66    Wt Readings from Last 3 Encounters:   08/20/19 72.5 kg (159 lb 12.8 oz)   08/14/19 70.4 kg (155 lb 3.3 oz)   08/09/19 69.9 kg (154 lb 1.6 oz)                    Reviewed and updated as needed this visit by Provider         Review of Systems   ROS COMP: Constitutional, HEENT, cardiovascular, pulmonary, GI, , musculoskeletal, neuro, skin, endocrine and psych systems are negative, except as otherwise noted.      Objective    BP 98/58 (BP Location: Left arm, Patient Position: Sitting, Cuff Size: Adult Regular)   Pulse 93   Temp 96.2  F (35.7  C) (Tympanic)   Wt 72.5 kg (159 lb 12.8 oz)   BMI 25.03 kg/m    Body mass index is 25.03 kg/m .  Physical Exam   GENERAL: healthy, alert and no distress  EYES: Eyes grossly normal to inspection, PERRL and conjunctivae and sclerae normal  HENT:  nose and mouth without ulcers or lesions  NECK: no adenopathy, no asymmetry, masses, or scars and thyroid normal to palpation  RESP: lungs clear to auscultation - no rales, rhonchi or wheezes  CV: S1-S2 without S3-S4 no murmurs  ABDOMEN: soft, nontender, no hepatosplenomegaly, no masses and bowel sounds normal  MS: He has pain in his knee with walking but not at rest.  He has full range of motion and no effusion.  No redness  SKIN: no suspicious lesions or rashes  NEURO: Normal strength and tone, mentation has cognitive loss and speech is understandable.  Does have a blank stare.  Wife provides the history  PSYCH: mentation appears normal, affect flattened    Diagnostic Test Results:  pending      7/12/19 11:14 AM [id number] [facility]    PACS Images      Show images for XR Knee Standing Bilateral 3 Views (Clinic Performed)   Study Result     PROCEDURE: XR KNEE STANDING BILATERAL 3 VW 7/12/2019 11:14 AM     HISTORY: Primary osteoarthritis of both knees     COMPARISONS: None.     TECHNIQUE: AP and  lateral views.     FINDINGS: There is moderate narrowing of the medial joint space on the  right with mild medial and lateral osteophyte formation. There are  posterior patellar osteophytes with probable patellofemoral narrowing.     On the left there is mild to moderate narrowing in the medial joint  space. No significant spur formation is seen. There is posterior  superior patellar spur formation on the left.     No fracture or dislocation is seen. Vascular calcification is seen.  There is no joint effusion.                                                                        IMPRESSION: Scattered degenerative change. No acute abnormality.     MAXINE RUCKER MD       Assessment & Plan       ICD-10-CM    1. Chronic pain of right knee M25.561 ORTHOPEDICS ADULT REFERRAL    G89.29    2. Diastolic dysfunction I51.89 Basic metabolic panel   3. Anemia due to blood loss, acute D62 CBC with platelets differential   Patient was recently hospitalized and discharged on August 14 with leg weakness and had heart failure with preserved ejection fraction and anemia down to 7.9 at discharge and a history of GI bleed stage III kidney disease is type II diabetic has COPD and cognitive impairment.  From a breathing point he seems to be feeling much better he is on Eliquis with a history of DVT and PE.  His main concern today is his chronic knee pain.  Did get a knee injection in July with Dr. Cheung.  Will refer to [facility] he will see them on August 28 for further evaluation of this chronic right knee pain.           No follow-ups on file.    JABIER Kent MD  [employer]

## 2019-08-20 NOTE — NURSING NOTE
"Chief Complaint   Patient presents with     Hospital F/U       Initial BP 98/58 (BP Location: Left arm, Patient Position: Sitting, Cuff Size: Adult Regular)   Pulse 93   Temp 96.2  F (35.7  C) (Tympanic)   Wt 72.5 kg (159 lb 12.8 oz)   BMI 25.03 kg/m   Estimated body mass index is 25.03 kg/m  as calculated from the following:    Height as of 8/13/19: 1.702 m (5' 7\").    Weight as of this encounter: 72.5 kg (159 lb 12.8 oz).  Medication Reconciliation: complete  "

## 2019-08-21 NOTE — TELEPHONE ENCOUNTER
Spoke with Leticia and she says that the patient does not have any prostate issues or BPH. Would like to know then if Cardura should be discontinued or just continue at 2 mg dose. Please advise. Thank you.

## 2019-08-21 NOTE — TELEPHONE ENCOUNTER
Preston Bryant, DO  You 3 hours ago (1:04 PM)     In that case, just have him stop it.  Thanks!     Dr. Bryant    Routing comment

## 2019-08-21 NOTE — TELEPHONE ENCOUNTER
Received call from Leticia RN from [facility]. Leticia stating that patient blood pressures have been significantly decreased lately. States they have been in the 90's/50's. Says that the patient is asymptomatic but she is wondering if the Cardura could be discontinued or decreased. Per records Cardura last ordered by Dr. Bryant on 1-21-19 but was discontinued by an ER RN on 8-13-19 for unknown reason. Please advise. Thank you.   Leticia would like a return phone call at [phone].

## 2019-08-21 NOTE — TELEPHONE ENCOUNTER
Preston shore, DO  You 51 minutes ago (12:06 PM)     Have him decrease it from 4 mg daily to 2 mg daily.  I did make this change in the computer.  This medication is also used for BPH.  If he does not have BPH, the medication can likely be discontinued.  If he does, then he may have problems with urination.  Have him try the lower dose at 2 mg to see if that improves his blood pressure.     Dr. Bryant    Routing comment

## 2019-08-22 NOTE — TELEPHONE ENCOUNTER
Letciia notified of Dr. Bryant instructions to discontinue Cardura. Verbalized understanding. No questions or concerns at this time.

## 2019-08-23 NOTE — TELEPHONE ENCOUNTER
Sats in 70s   Has continued to be hypotensive   Breathing has been really bad  Has been seen recently in hospital and   Seen by you 8/20/2019   Wife and patient have decided against pursuing treatment any longer     Does qualify for hospice and can be enrolled today will need verbal orders.  Please advise     [phone]  [place]

## 2019-08-28 PROBLEM — Z51.5 HOSPICE CARE PATIENT: Status: ACTIVE | Noted: 2019-01-01

## 2019-08-28 NOTE — PROGRESS NOTES
PHYSICIAN CERTIFICATION OF TERMINAL ILLNESS FOR HOSPICE BENEFIT    August 23, 2019    Mary Irizarry    8/27/1932      Effective Date of Certification    Start Date:  8/23/2019      End Date:  11/20/2019    Terminal Diagnosis:    Heart Failure      Secondary Diagnoses:     Atrial fibrillation     Hypertension       Prognosis Related Comorbidities:    COPD (chronic obstructive pulmonary disease)     Diabetes mellitus, type 2     Chronic kidney disease       Narrative Statement:  Client suffers from end stage heart failure and is on maximal tolerated medical therapy.  She continues to decline. Client resides at home and requires assistance with ADL's.  Goals of symptom control will be met.  Because of the progressive nature of the illness and associated comorbidities, client qualifies for hospice care.             I certify that this patient is terminally ill and has a life expectancy of 6 months or less if the terminal illness runs its anticipated course.        Attestation:  I confirm that this narrative was composed by myself and is based on review of the medical record and/or examination of the patient.            Jamshid Garcia MD

## 2020-12-15 NOTE — NURSING NOTE
"Chief Complaint   Patient presents with     RECHECK     Pt is here for a 1 month follow up.  Pt had an EKG and was placed on medications.       Initial /67 (BP Location: Left arm, Patient Position: Chair, Cuff Size: Adult Regular)  Pulse 80  Resp 18  Ht 1.727 m (5' 8\")  Wt 72.1 kg (159 lb)  SpO2 96%  BMI 24.18 kg/m2 Estimated body mass index is 24.18 kg/(m^2) as calculated from the following:    Height as of this encounter: 1.727 m (5' 8\").    Weight as of this encounter: 72.1 kg (159 lb).  Medication Reconciliation: complete    Kaci Sabillon LPN    " Chest pain